# Patient Record
Sex: MALE | Race: BLACK OR AFRICAN AMERICAN | NOT HISPANIC OR LATINO | Employment: FULL TIME | URBAN - NONMETROPOLITAN AREA
[De-identification: names, ages, dates, MRNs, and addresses within clinical notes are randomized per-mention and may not be internally consistent; named-entity substitution may affect disease eponyms.]

---

## 2017-05-11 ENCOUNTER — APPOINTMENT (OUTPATIENT)
Dept: LAB | Facility: HOSPITAL | Age: 27
End: 2017-05-11

## 2017-05-11 ENCOUNTER — OFFICE VISIT (OUTPATIENT)
Dept: FAMILY MEDICINE CLINIC | Facility: CLINIC | Age: 27
End: 2017-05-11

## 2017-05-11 VITALS
WEIGHT: 223.4 LBS | HEIGHT: 71 IN | BODY MASS INDEX: 31.27 KG/M2 | SYSTOLIC BLOOD PRESSURE: 132 MMHG | DIASTOLIC BLOOD PRESSURE: 94 MMHG

## 2017-05-11 DIAGNOSIS — I10 ESSENTIAL HYPERTENSION, BENIGN: Primary | ICD-10-CM

## 2017-05-11 DIAGNOSIS — F12.90 MARIJUANA USE: ICD-10-CM

## 2017-05-11 DIAGNOSIS — Z11.59 NEED FOR HEPATITIS C SCREENING TEST: ICD-10-CM

## 2017-05-11 LAB
ALBUMIN SERPL-MCNC: 4.7 G/DL (ref 3.4–4.8)
ALBUMIN UR-MCNC: 1.7 MG/L
ALBUMIN/GLOB SERPL: 1.4 G/DL (ref 1.1–1.8)
ALP SERPL-CCNC: 74 U/L (ref 38–126)
ALT SERPL W P-5'-P-CCNC: 77 U/L (ref 21–72)
AMPHET+METHAMPHET UR QL: NEGATIVE
ANION GAP SERPL CALCULATED.3IONS-SCNC: 12 MMOL/L (ref 5–15)
AST SERPL-CCNC: 48 U/L (ref 17–59)
BARBITURATES UR QL SCN: NEGATIVE
BENZODIAZ UR QL SCN: NEGATIVE
BILIRUB SERPL-MCNC: 0.4 MG/DL (ref 0.2–1.3)
BUN BLD-MCNC: 20 MG/DL (ref 7–21)
BUN/CREAT SERPL: 20 (ref 7–25)
CALCIUM SPEC-SCNC: 9.8 MG/DL (ref 8.4–10.2)
CANNABINOIDS SERPL QL: POSITIVE
CHLORIDE SERPL-SCNC: 100 MMOL/L (ref 95–110)
CHOLEST SERPL-MCNC: 147 MG/DL (ref 0–199)
CO2 SERPL-SCNC: 27 MMOL/L (ref 22–31)
COCAINE UR QL: NEGATIVE
CREAT BLD-MCNC: 1 MG/DL (ref 0.7–1.3)
DEPRECATED RDW RBC AUTO: 40.5 FL (ref 35.1–43.9)
ERYTHROCYTE [DISTWIDTH] IN BLOOD BY AUTOMATED COUNT: 12.6 % (ref 11.5–14.5)
GFR SERPL CREATININE-BSD FRML MDRD: 107 ML/MIN/1.73 (ref 77–179)
GLOBULIN UR ELPH-MCNC: 3.3 GM/DL (ref 2.3–3.5)
GLUCOSE BLD-MCNC: 103 MG/DL (ref 60–100)
HCT VFR BLD AUTO: 38.4 % (ref 39–49)
HDLC SERPL-MCNC: 40 MG/DL (ref 60–200)
HGB BLD-MCNC: 13.2 G/DL (ref 13.7–17.3)
LDLC SERPL CALC-MCNC: 93 MG/DL (ref 0–129)
LDLC/HDLC SERPL: 2.34 {RATIO} (ref 0–3.55)
MAGNESIUM SERPL-MCNC: 2 MG/DL (ref 1.6–2.3)
MCH RBC QN AUTO: 30.1 PG (ref 26.5–34)
MCHC RBC AUTO-ENTMCNC: 34.4 G/DL (ref 31.5–36.3)
MCV RBC AUTO: 87.5 FL (ref 80–98)
METHADONE UR QL SCN: NEGATIVE
OPIATES UR QL: NEGATIVE
OXYCODONE UR QL SCN: NEGATIVE
PLATELET # BLD AUTO: 159 10*3/MM3 (ref 150–450)
PMV BLD AUTO: 11.5 FL (ref 8–12)
POTASSIUM BLD-SCNC: 4 MMOL/L (ref 3.5–5.1)
PROT SERPL-MCNC: 8 G/DL (ref 6.3–8.6)
RBC # BLD AUTO: 4.39 10*6/MM3 (ref 4.37–5.74)
SODIUM BLD-SCNC: 139 MMOL/L (ref 137–145)
T4 FREE SERPL-MCNC: 1.19 NG/DL (ref 0.78–2.19)
TRIGL SERPL-MCNC: 68 MG/DL (ref 20–199)
TSH SERPL DL<=0.05 MIU/L-ACNC: 1.11 MIU/ML (ref 0.46–4.68)
VLDLC SERPL-MCNC: 13.6 MG/DL
WBC NRBC COR # BLD: 6.48 10*3/MM3 (ref 3.2–9.8)

## 2017-05-11 PROCEDURE — 80307 DRUG TEST PRSMV CHEM ANLYZR: CPT | Performed by: FAMILY MEDICINE

## 2017-05-11 PROCEDURE — 84443 ASSAY THYROID STIM HORMONE: CPT | Performed by: FAMILY MEDICINE

## 2017-05-11 PROCEDURE — 80074 ACUTE HEPATITIS PANEL: CPT | Performed by: FAMILY MEDICINE

## 2017-05-11 PROCEDURE — 80061 LIPID PANEL: CPT | Performed by: FAMILY MEDICINE

## 2017-05-11 PROCEDURE — 84439 ASSAY OF FREE THYROXINE: CPT | Performed by: FAMILY MEDICINE

## 2017-05-11 PROCEDURE — 83735 ASSAY OF MAGNESIUM: CPT | Performed by: FAMILY MEDICINE

## 2017-05-11 PROCEDURE — 85027 COMPLETE CBC AUTOMATED: CPT | Performed by: FAMILY MEDICINE

## 2017-05-11 PROCEDURE — 99203 OFFICE O/P NEW LOW 30 MIN: CPT | Performed by: FAMILY MEDICINE

## 2017-05-11 PROCEDURE — 82043 UR ALBUMIN QUANTITATIVE: CPT | Performed by: FAMILY MEDICINE

## 2017-05-11 PROCEDURE — 36415 COLL VENOUS BLD VENIPUNCTURE: CPT | Performed by: FAMILY MEDICINE

## 2017-05-11 PROCEDURE — 80053 COMPREHEN METABOLIC PANEL: CPT | Performed by: FAMILY MEDICINE

## 2017-05-11 RX ORDER — CHLORTHALIDONE 25 MG/1
25 TABLET ORAL DAILY
Qty: 90 TABLET | Refills: 3 | Status: SHIPPED | OUTPATIENT
Start: 2017-05-11 | End: 2017-06-20 | Stop reason: SDUPTHER

## 2017-05-11 RX ORDER — AMLODIPINE BESYLATE 10 MG/1
10 TABLET ORAL DAILY
Qty: 90 TABLET | Refills: 3 | Status: SHIPPED | OUTPATIENT
Start: 2017-05-11 | End: 2017-06-20 | Stop reason: SDUPTHER

## 2017-05-11 RX ORDER — PANTOPRAZOLE SODIUM 40 MG/1
40 TABLET, DELAYED RELEASE ORAL DAILY
COMMUNITY
Start: 2017-04-11 | End: 2017-06-20 | Stop reason: SDUPTHER

## 2017-05-11 RX ORDER — AMLODIPINE BESYLATE 5 MG/1
5 TABLET ORAL DAILY
COMMUNITY
Start: 2017-04-18 | End: 2017-05-11

## 2017-05-11 RX ORDER — LISINOPRIL AND HYDROCHLOROTHIAZIDE 25; 20 MG/1; MG/1
1 TABLET ORAL DAILY
COMMUNITY
Start: 2017-04-13 | End: 2017-05-11

## 2017-05-12 LAB
HAV IGM SERPL QL IA: NEGATIVE
HBV CORE IGM SERPL QL IA: NEGATIVE
HBV SURFACE AG SERPL QL IA: NEGATIVE
HCV AB SER DONR QL: NEGATIVE

## 2017-06-06 ENCOUNTER — OFFICE VISIT (OUTPATIENT)
Dept: FAMILY MEDICINE CLINIC | Facility: CLINIC | Age: 27
End: 2017-06-06

## 2017-06-06 VITALS
DIASTOLIC BLOOD PRESSURE: 79 MMHG | BODY MASS INDEX: 31.5 KG/M2 | WEIGHT: 225 LBS | SYSTOLIC BLOOD PRESSURE: 132 MMHG | HEIGHT: 71 IN

## 2017-06-06 DIAGNOSIS — I10 ESSENTIAL HYPERTENSION, BENIGN: Primary | Chronic | ICD-10-CM

## 2017-06-06 DIAGNOSIS — J31.0 RHINITIS, UNSPECIFIED TYPE: ICD-10-CM

## 2017-06-06 DIAGNOSIS — Z23 NEED FOR VACCINATION: ICD-10-CM

## 2017-06-06 PROBLEM — F10.90 HEAVY ALCOHOL USE: Chronic | Status: ACTIVE | Noted: 2017-06-06

## 2017-06-06 PROBLEM — F10.90 HEAVY ALCOHOL USE: Status: ACTIVE | Noted: 2017-06-06

## 2017-06-06 PROBLEM — F12.90 MARIJUANA USE: Chronic | Status: ACTIVE | Noted: 2017-05-11

## 2017-06-06 PROCEDURE — 90715 TDAP VACCINE 7 YRS/> IM: CPT | Performed by: FAMILY MEDICINE

## 2017-06-06 PROCEDURE — 99213 OFFICE O/P EST LOW 20 MIN: CPT | Performed by: FAMILY MEDICINE

## 2017-06-06 PROCEDURE — 90471 IMMUNIZATION ADMIN: CPT | Performed by: FAMILY MEDICINE

## 2017-06-06 RX ORDER — FLUTICASONE PROPIONATE 50 MCG
2 SPRAY, SUSPENSION (ML) NASAL DAILY
Qty: 1 EACH | Refills: 11 | Status: SHIPPED | OUTPATIENT
Start: 2017-06-06 | End: 2017-06-20

## 2017-06-06 NOTE — PROGRESS NOTES
Subjective   Roosevelt Ochoa is a 27 y.o. male.     History of Present Illness reevaluation hypertension.  In the interim lab work was acceptable no problems medication.  Is developed a sore throat drainage postnasal drip last 2 weeks related mainly to environment.  Also needs update on tetanus.  States has cut back on smoking marijuana cut back on alcohol as well.    The following portions of the patient's history were reviewed and updated as appropriate: allergies, current medications, past family history, past medical history, past social history, past surgical history and problem list.    Review of Systems   Constitutional: Negative for activity change, appetite change, fatigue and unexpected weight change.   HENT: Positive for rhinorrhea and sore throat. Negative for trouble swallowing and voice change.    Eyes: Negative for redness and visual disturbance.   Respiratory: Negative for cough and wheezing.    Cardiovascular: Negative for chest pain and palpitations.   Gastrointestinal: Negative for abdominal pain, constipation, diarrhea, nausea and vomiting.   Genitourinary: Negative for urgency.   Musculoskeletal: Negative for joint swelling.   Neurological: Negative for syncope and headaches.   Hematological: Negative for adenopathy.   Psychiatric/Behavioral: Negative for sleep disturbance.       Objective   Physical Exam   Constitutional: He is oriented to person, place, and time. He appears well-developed.   HENT:   Head: Normocephalic.   Right Ear: External ear normal.   Nose: Mucosal edema and rhinorrhea present.   Mouth/Throat: Posterior oropharyngeal erythema present.   Eyes: Pupils are equal, round, and reactive to light.   Neck: Normal range of motion. No thyromegaly present.   Cardiovascular: Normal rate, regular rhythm, normal heart sounds and intact distal pulses.  Exam reveals no gallop and no friction rub.    No murmur heard.  Pulmonary/Chest: Breath sounds normal.   Abdominal: Soft. He  exhibits no distension and no mass. There is no tenderness.   Musculoskeletal: Normal range of motion.   Neurological: He is alert and oriented to person, place, and time. He has normal reflexes.   Skin: Skin is warm and dry.   Psychiatric: He has a normal mood and affect.       Assessment/Plan   Problems Addressed this Visit        Cardiovascular and Mediastinum    Essential hypertension, benign - Primary (Chronic)       Other    BMI 31.0-31.9,adult (Chronic)      Other Visit Diagnoses     Rhinitis, unspecified type        Relevant Medications    fluticasone (FLONASE) 50 MCG/ACT nasal spray    Need for vaccination        Relevant Orders    Tdap Vaccine Greater Than or Equal To 6yo IM        Radha pot use fluids medication.   continuing medications for blood pressure.  Lifestyle changes.  Recheck 6 months.

## 2017-06-20 ENCOUNTER — OFFICE VISIT (OUTPATIENT)
Dept: FAMILY MEDICINE CLINIC | Facility: CLINIC | Age: 27
End: 2017-06-20

## 2017-06-20 VITALS
TEMPERATURE: 99.5 F | SYSTOLIC BLOOD PRESSURE: 124 MMHG | HEIGHT: 71 IN | WEIGHT: 229 LBS | DIASTOLIC BLOOD PRESSURE: 80 MMHG | BODY MASS INDEX: 32.06 KG/M2

## 2017-06-20 DIAGNOSIS — J06.9 ACUTE URI: ICD-10-CM

## 2017-06-20 DIAGNOSIS — I10 ESSENTIAL HYPERTENSION, BENIGN: ICD-10-CM

## 2017-06-20 DIAGNOSIS — R19.7 DIARRHEA, UNSPECIFIED TYPE: Primary | ICD-10-CM

## 2017-06-20 DIAGNOSIS — J40 BRONCHITIS: ICD-10-CM

## 2017-06-20 PROCEDURE — 99214 OFFICE O/P EST MOD 30 MIN: CPT | Performed by: FAMILY MEDICINE

## 2017-06-20 RX ORDER — AMLODIPINE BESYLATE 10 MG/1
10 TABLET ORAL DAILY
Qty: 90 TABLET | Refills: 3 | Status: SHIPPED | OUTPATIENT
Start: 2017-06-20 | End: 2017-12-06 | Stop reason: SDUPTHER

## 2017-06-20 RX ORDER — ALBUTEROL SULFATE 90 UG/1
2 AEROSOL, METERED RESPIRATORY (INHALATION) EVERY 4 HOURS PRN
Qty: 1 INHALER | Refills: 2 | Status: SHIPPED | OUTPATIENT
Start: 2017-06-20 | End: 2018-08-14 | Stop reason: SDUPTHER

## 2017-06-20 RX ORDER — CHLORTHALIDONE 25 MG/1
25 TABLET ORAL DAILY
Qty: 90 TABLET | Refills: 3 | Status: SHIPPED | OUTPATIENT
Start: 2017-06-20 | End: 2017-09-17

## 2017-06-20 RX ORDER — PANTOPRAZOLE SODIUM 40 MG/1
40 TABLET, DELAYED RELEASE ORAL DAILY
Qty: 90 TABLET | Refills: 3 | Status: SHIPPED | OUTPATIENT
Start: 2017-06-20 | End: 2018-07-05 | Stop reason: SDUPTHER

## 2017-06-20 RX ORDER — PREDNISONE 20 MG/1
TABLET ORAL
Qty: 10 TABLET | Refills: 0 | Status: SHIPPED | OUTPATIENT
Start: 2017-06-20 | End: 2017-09-17

## 2017-06-20 RX ORDER — LEVOFLOXACIN 500 MG/1
500 TABLET, FILM COATED ORAL DAILY
Qty: 10 TABLET | Refills: 0 | Status: SHIPPED | OUTPATIENT
Start: 2017-06-20 | End: 2017-09-17

## 2017-06-20 RX ORDER — PROMETHAZINE HYDROCHLORIDE AND PHENYLEPHRINE HYDROCHLORIDE 6.25; 5 MG/5ML; MG/5ML
5 SYRUP ORAL EVERY 4 HOURS PRN
Qty: 240 ML | Refills: 0 | Status: SHIPPED | OUTPATIENT
Start: 2017-06-20 | End: 2017-09-17

## 2017-06-20 NOTE — PROGRESS NOTES
Subjective   Roosevelt Ochoa is a 27 y.o. male.     History of Present Illness for 5 days cough congestion coryza wheeze.  Exposed to same.  Low-grade fever.  Some associated diarrhea.  Has used an inhaler in the past but none recently.  History noted.    The following portions of the patient's history were reviewed and updated as appropriate: allergies, current medications, past family history, past medical history, past social history, past surgical history and problem list.    Review of Systems   Constitutional: Negative for activity change, appetite change, fatigue and unexpected weight change.   HENT: Negative for trouble swallowing and voice change.    Eyes: Negative for redness and visual disturbance.   Respiratory: Positive for cough. Negative for wheezing.    Cardiovascular: Negative for chest pain and palpitations.   Gastrointestinal: Positive for diarrhea and nausea. Negative for abdominal pain, constipation and vomiting.   Genitourinary: Negative for urgency.   Musculoskeletal: Negative for joint swelling.   Neurological: Negative for syncope and headaches.   Hematological: Negative for adenopathy.   Psychiatric/Behavioral: Negative for sleep disturbance.       Objective   Physical Exam   HENT:   Head: Normocephalic.   Right Ear: External ear normal.   Left Ear: External ear normal.   Mouth/Throat: Oropharynx is clear and moist.   Eyes: Pupils are equal, round, and reactive to light.   Neck: Normal range of motion. Neck supple.   Cardiovascular: Normal rate, regular rhythm and normal heart sounds.    Pulmonary/Chest: Effort normal. He has wheezes (Scant expiratory bases).   Abdominal: Soft. Bowel sounds are normal.   Musculoskeletal: Normal range of motion.   Neurological: He is alert.       Assessment/Plan   Problems Addressed this Visit        Cardiovascular and Mediastinum    Essential hypertension, benign (Chronic)    Relevant Medications    amLODIPine (NORVASC) 10 MG tablet    chlorthalidone  (HYGROTON) 25 MG tablet      Other Visit Diagnoses     Diarrhea, unspecified type    -  Primary    Bronchitis        Relevant Medications    pantoprazole (PROTONIX) 40 MG EC tablet    albuterol (PROVENTIL HFA;VENTOLIN HFA) 108 (90 BASE) MCG/ACT inhaler    promethazine-phenylephrine 6.25-5 MG/5ML syrup syrup    levoFLOXacin (LEVAQUIN) 500 MG tablet    predniSONE (DELTASONE) 20 MG tablet    Acute URI        Relevant Medications    pantoprazole (PROTONIX) 40 MG EC tablet    albuterol (PROVENTIL HFA;VENTOLIN HFA) 108 (90 BASE) MCG/ACT inhaler    promethazine-phenylephrine 6.25-5 MG/5ML syrup syrup    levoFLOXacin (LEVAQUIN) 500 MG tablet           holding off on chlorthalidone for 3 or 4 days.  Increase electrolyte oral hydration.  Inhaler use medication use counseled on same.  Flu vaccine in the fall.  Recheck as directed.

## 2017-09-17 ENCOUNTER — HOSPITAL ENCOUNTER (EMERGENCY)
Facility: HOSPITAL | Age: 27
Discharge: HOME | End: 2017-09-17
Attending: EMERGENCY MEDICINE | Admitting: EMERGENCY MEDICINE

## 2017-09-17 ENCOUNTER — APPOINTMENT (OUTPATIENT)
Dept: CT IMAGING | Facility: HOSPITAL | Age: 27
End: 2017-09-17

## 2017-09-17 VITALS
DIASTOLIC BLOOD PRESSURE: 100 MMHG | WEIGHT: 223 LBS | OXYGEN SATURATION: 99 % | BODY MASS INDEX: 31.22 KG/M2 | RESPIRATION RATE: 18 BRPM | TEMPERATURE: 98.1 F | SYSTOLIC BLOOD PRESSURE: 181 MMHG | HEIGHT: 71 IN | HEART RATE: 65 BPM

## 2017-09-17 DIAGNOSIS — S02.30XB: Primary | ICD-10-CM

## 2017-09-17 DIAGNOSIS — H11.32 CONJUNCTIVAL HEMORRHAGE OF LEFT EYE: ICD-10-CM

## 2017-09-17 PROCEDURE — 99284 EMERGENCY DEPT VISIT MOD MDM: CPT

## 2017-09-17 PROCEDURE — 70486 CT MAXILLOFACIAL W/O DYE: CPT

## 2017-09-17 RX ORDER — CLINDAMYCIN HYDROCHLORIDE 300 MG/1
300 CAPSULE ORAL 3 TIMES DAILY
Qty: 30 CAPSULE | Refills: 0 | Status: SHIPPED | OUTPATIENT
Start: 2017-09-17 | End: 2017-09-17 | Stop reason: HOSPADM

## 2017-09-17 RX ORDER — HYDROCODONE BITARTRATE AND ACETAMINOPHEN 5; 325 MG/1; MG/1
1 TABLET ORAL EVERY 6 HOURS PRN
Qty: 29 TABLET | Refills: 0 | Status: SHIPPED | OUTPATIENT
Start: 2017-09-17 | End: 2017-09-20

## 2017-09-17 RX ORDER — HYDROCODONE BITARTRATE AND ACETAMINOPHEN 5; 325 MG/1; MG/1
1 TABLET ORAL ONCE
Status: COMPLETED | OUTPATIENT
Start: 2017-09-17 | End: 2017-09-17

## 2017-09-17 RX ORDER — CEPHALEXIN 500 MG/1
500 CAPSULE ORAL 4 TIMES DAILY
Qty: 40 CAPSULE | Refills: 0 | Status: SHIPPED | OUTPATIENT
Start: 2017-09-17 | End: 2017-09-27

## 2017-09-17 RX ADMIN — HYDROCODONE BITARTRATE AND ACETAMINOPHEN 1 TABLET: 5; 325 TABLET ORAL at 15:52
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
## 2017-09-17 NOTE — DISCHARGE INSTRUCTIONS

## 2017-12-06 ENCOUNTER — OFFICE VISIT (OUTPATIENT)
Dept: FAMILY MEDICINE CLINIC | Facility: CLINIC | Age: 27
End: 2017-12-06

## 2017-12-06 VITALS
WEIGHT: 235.1 LBS | BODY MASS INDEX: 32.91 KG/M2 | HEIGHT: 71 IN | DIASTOLIC BLOOD PRESSURE: 88 MMHG | SYSTOLIC BLOOD PRESSURE: 122 MMHG

## 2017-12-06 DIAGNOSIS — I10 ESSENTIAL HYPERTENSION, BENIGN: Primary | Chronic | ICD-10-CM

## 2017-12-06 DIAGNOSIS — Z23 NEED FOR IMMUNIZATION AGAINST INFLUENZA: ICD-10-CM

## 2017-12-06 PROBLEM — Z86.69 HISTORY OF RETINAL TEAR: Chronic | Status: ACTIVE | Noted: 2017-12-06

## 2017-12-06 PROBLEM — Z86.69 HISTORY OF RETINAL TEAR: Status: ACTIVE | Noted: 2017-12-06

## 2017-12-06 PROBLEM — F10.90 HEAVY ALCOHOL USE: Chronic | Status: RESOLVED | Noted: 2017-06-06 | Resolved: 2017-12-06

## 2017-12-06 PROCEDURE — 99213 OFFICE O/P EST LOW 20 MIN: CPT | Performed by: FAMILY MEDICINE

## 2017-12-06 PROCEDURE — 90471 IMMUNIZATION ADMIN: CPT | Performed by: FAMILY MEDICINE

## 2017-12-06 PROCEDURE — 90686 IIV4 VACC NO PRSV 0.5 ML IM: CPT | Performed by: FAMILY MEDICINE

## 2017-12-06 RX ORDER — AMLODIPINE BESYLATE 10 MG/1
10 TABLET ORAL DAILY
Qty: 90 TABLET | Refills: 3 | Status: SHIPPED | OUTPATIENT
Start: 2017-12-06 | End: 2019-05-08

## 2017-12-06 NOTE — PROGRESS NOTES
Subjective   Roosevelt Ochoa is a 27 y.o. male.     History of Present Illness   reevaluation hypertension mild obesity.  In interim said a retinal tear that had to be repaired was in active for about a month.  Weights gone up slightly.  Blood pressure control over.  States she's cut back on alcohol and THC use.  Does need a flu vaccine.  Last lab work normal.    The following portions of the patient's history were reviewed and updated as appropriate: allergies, current medications, past family history, past medical history, past social history, past surgical history and problem list.    Review of Systems   Constitutional: Negative for activity change, appetite change, fatigue and unexpected weight change.   HENT: Negative for trouble swallowing and voice change.    Eyes: Negative for redness and visual disturbance.   Respiratory: Negative for cough and wheezing.    Cardiovascular: Negative for chest pain and palpitations.   Gastrointestinal: Negative for abdominal pain, constipation, diarrhea, nausea and vomiting.   Genitourinary: Negative for urgency.   Musculoskeletal: Negative for joint swelling.   Neurological: Negative for syncope and headaches.   Hematological: Negative for adenopathy.   Psychiatric/Behavioral: Negative for sleep disturbance.       Objective   Physical Exam   Constitutional: He is oriented to person, place, and time. He appears well-developed.   HENT:   Head: Normocephalic.   Nose: Nose normal.   Eyes: Pupils are equal, round, and reactive to light.   Neck: Normal range of motion. No thyromegaly present.   Cardiovascular: Normal rate, regular rhythm, normal heart sounds and intact distal pulses.  Exam reveals no gallop and no friction rub.    No murmur heard.  Pulmonary/Chest: Breath sounds normal.   Abdominal: Soft. He exhibits no distension and no mass. There is no tenderness.   Musculoskeletal: Normal range of motion.   Neurological: He is alert and oriented to person, place, and  time. He has normal reflexes.   Skin: Skin is warm and dry.   Psychiatric: He has a normal mood and affect. His behavior is normal. Thought content normal.       Assessment/Plan   Problems Addressed this Visit        Cardiovascular and Mediastinum    Essential hypertension, benign - Primary (Chronic)    Relevant Medications    amLODIPine (NORVASC) 10 MG tablet       Other    BMI 31.0-31.9,adult (Chronic)      Other Visit Diagnoses     Need for immunization against influenza        Relevant Orders    Flu Vaccine Quad PF 3YR+ (Completed)           lifestyle measures weight loss continue continue current medicines recheck 6 months betime for lab

## 2018-03-21 ENCOUNTER — OFFICE VISIT (OUTPATIENT)
Dept: FAMILY MEDICINE CLINIC | Facility: CLINIC | Age: 28
End: 2018-03-21

## 2018-03-21 VITALS
BODY MASS INDEX: 31.64 KG/M2 | SYSTOLIC BLOOD PRESSURE: 140 MMHG | HEIGHT: 71 IN | DIASTOLIC BLOOD PRESSURE: 90 MMHG | WEIGHT: 226 LBS

## 2018-03-21 DIAGNOSIS — J11.1 INFLUENZA: ICD-10-CM

## 2018-03-21 DIAGNOSIS — J06.9 ACUTE URI: Primary | ICD-10-CM

## 2018-03-21 PROCEDURE — 99213 OFFICE O/P EST LOW 20 MIN: CPT | Performed by: FAMILY MEDICINE

## 2018-03-21 RX ORDER — CHLORTHALIDONE 25 MG/1
25 TABLET ORAL DAILY
COMMUNITY
Start: 2018-01-29 | End: 2019-05-08

## 2018-03-21 RX ORDER — AMOXICILLIN 875 MG/1
875 TABLET, COATED ORAL DAILY
COMMUNITY
Start: 2018-01-29 | End: 2018-03-21

## 2018-03-21 RX ORDER — ONDANSETRON 4 MG/1
4 TABLET, FILM COATED ORAL DAILY
COMMUNITY
Start: 2018-01-29 | End: 2018-08-07

## 2018-03-21 NOTE — PROGRESS NOTES
Subjective   Roosevelt Ochoa is a 27 y.o. male.     History of Present Illness  reevaluation care center.  Diagnosed influenza negative screening for clinically had same.  Was given Tamiflu and symptomatic relief.  Patient states has has some mild congestion still.  Overall feels greatly improved.  Nochills.Historynoted.    The following portions of the patient's history were reviewed and updated as appropriate: allergies, current medications, past family history, past medical history, past social history, past surgical history and problem list.    Review of Systems   Constitutional: Negative for activity change, appetite change, fatigue and unexpected weight change.   HENT: Positive for congestion. Negative for trouble swallowing and voice change.    Eyes: Negative for redness and visual disturbance.   Respiratory: Negative for cough and wheezing.    Cardiovascular: Negative for chest pain and palpitations.   Gastrointestinal: Negative for abdominal pain, constipation, diarrhea, nausea and vomiting.   Genitourinary: Negative for urgency.   Musculoskeletal: Negative for joint swelling.   Neurological: Negative for syncope and headaches.   Hematological: Negative for adenopathy.   Psychiatric/Behavioral: Negative for sleep disturbance.       Objective   Physical Exam   Constitutional: He appears well-developed.   HENT:   Head: Normocephalic.   Eyes: Pupils are equal, round, and reactive to light.   Neck: Normal range of motion.   Cardiovascular: Normal rate.    Pulmonary/Chest: Effort normal and breath sounds normal.   Abdominal: Soft.   Psychiatric: He has a normal mood and affect. His behavior is normal. Thought content normal.       Assessment/Plan   Roosevelt was seen today for follow-up.    Diagnoses and all orders for this visit:    Acute URI    Influenza        on hydration Radha medication fluids symptomatic relief recheck as directed

## 2018-06-27 ENCOUNTER — OFFICE VISIT (OUTPATIENT)
Dept: FAMILY MEDICINE CLINIC | Facility: CLINIC | Age: 28
End: 2018-06-27

## 2018-06-27 ENCOUNTER — APPOINTMENT (OUTPATIENT)
Dept: LAB | Facility: HOSPITAL | Age: 28
End: 2018-06-27

## 2018-06-27 VITALS
WEIGHT: 229 LBS | BODY MASS INDEX: 32.06 KG/M2 | HEIGHT: 71 IN | SYSTOLIC BLOOD PRESSURE: 140 MMHG | DIASTOLIC BLOOD PRESSURE: 82 MMHG

## 2018-06-27 DIAGNOSIS — I10 ESSENTIAL HYPERTENSION, BENIGN: Chronic | ICD-10-CM

## 2018-06-27 DIAGNOSIS — M25.522 ELBOW PAIN, LEFT: Primary | ICD-10-CM

## 2018-06-27 DIAGNOSIS — M75.22 BICEPS TENDONITIS ON LEFT: ICD-10-CM

## 2018-06-27 LAB
ALBUMIN SERPL-MCNC: 4.4 G/DL (ref 3.4–4.8)
ALBUMIN/GLOB SERPL: 1.4 G/DL (ref 1.1–1.8)
ALP SERPL-CCNC: 86 U/L (ref 38–126)
ALT SERPL W P-5'-P-CCNC: 43 U/L (ref 21–72)
ANION GAP SERPL CALCULATED.3IONS-SCNC: 10 MMOL/L (ref 5–15)
AST SERPL-CCNC: 48 U/L (ref 17–59)
BILIRUB SERPL-MCNC: 0.3 MG/DL (ref 0.2–1.3)
BUN BLD-MCNC: 12 MG/DL (ref 7–21)
BUN/CREAT SERPL: 15.2 (ref 7–25)
CALCIUM SPEC-SCNC: 9.1 MG/DL (ref 8.4–10.2)
CHLORIDE SERPL-SCNC: 105 MMOL/L (ref 95–110)
CO2 SERPL-SCNC: 26 MMOL/L (ref 22–31)
CREAT BLD-MCNC: 0.79 MG/DL (ref 0.7–1.3)
GFR SERPL CREATININE-BSD FRML MDRD: 140 ML/MIN/1.73 (ref 70–162)
GLOBULIN UR ELPH-MCNC: 3.1 GM/DL (ref 2.3–3.5)
GLUCOSE BLD-MCNC: 93 MG/DL (ref 60–100)
MAGNESIUM SERPL-MCNC: 2 MG/DL (ref 1.6–2.3)
POTASSIUM BLD-SCNC: 4.1 MMOL/L (ref 3.5–5.1)
PROT SERPL-MCNC: 7.5 G/DL (ref 6.3–8.6)
SODIUM BLD-SCNC: 141 MMOL/L (ref 137–145)

## 2018-06-27 PROCEDURE — 99214 OFFICE O/P EST MOD 30 MIN: CPT | Performed by: FAMILY MEDICINE

## 2018-06-27 PROCEDURE — 36415 COLL VENOUS BLD VENIPUNCTURE: CPT | Performed by: FAMILY MEDICINE

## 2018-06-27 PROCEDURE — 83735 ASSAY OF MAGNESIUM: CPT | Performed by: FAMILY MEDICINE

## 2018-06-27 PROCEDURE — 80053 COMPREHEN METABOLIC PANEL: CPT | Performed by: FAMILY MEDICINE

## 2018-06-27 RX ORDER — MELOXICAM 7.5 MG/1
TABLET ORAL
Qty: 30 TABLET | Refills: 1 | Status: SHIPPED | OUTPATIENT
Start: 2018-06-27 | End: 2019-05-29

## 2018-06-27 NOTE — PROGRESS NOTES
Subjective   Roosevelt Ochoa is a 28 y.o. male.     History of Present Illness   follow-up hypertension also developed left elbow pain biceps distal tendon area.  Been doing a lot of heavy lifting lifting blocks etc.  Undergo now about 3 weeks getting a little better.  Remains on blood pressure medicine.    The following portions of the patient's history were reviewed and updated as appropriate: allergies, current medications, past family history, past medical history, past social history, past surgical history and problem list.    Review of Systems   Constitutional: Negative for activity change, appetite change, fatigue and unexpected weight change.   HENT: Negative for trouble swallowing and voice change.    Eyes: Negative for redness and visual disturbance.   Respiratory: Negative for cough and wheezing.    Cardiovascular: Negative for chest pain and palpitations.   Gastrointestinal: Negative for abdominal pain, constipation, diarrhea, nausea and vomiting.   Genitourinary: Negative for urgency.   Musculoskeletal: Positive for arthralgias. Negative for joint swelling.   Neurological: Negative for syncope and headaches.   Hematological: Negative for adenopathy.   Psychiatric/Behavioral: Negative for sleep disturbance.       Objective   Physical Exam   Constitutional: He appears well-developed.   HENT:   Head: Normocephalic.   Eyes: Pupils are equal, round, and reactive to light.   Neck: Normal range of motion.   Cardiovascular: Normal rate and regular rhythm.    Pulmonary/Chest: Effort normal.   Abdominal: Soft.   Musculoskeletal:   Left elbow shows medial lateral epicondyles nontender lice of tendon tender to deep insertion his antecubital fossa.  Pain to full extension of the antecubital fossa only.  Mild tenderness to full extension of the shoulder.  No double bubble sign.   normal.   Psychiatric: He has a normal mood and affect. His speech is normal and behavior is normal.       Assessment/Plan   Roosevelt  was seen today for follow-up.    Diagnoses and all orders for this visit:    Elbow pain, left  -     meloxicam (MOBIC) 7.5 MG tablet; 1 bid x 5d then prn elbow    Biceps tendonitis on left  -     meloxicam (MOBIC) 7.5 MG tablet; 1 bid x 5d then prn elbow    Essential hypertension, benign  -     Comprehensive Metabolic Panel  -     Magnesium    BMI 31.0-31.9,adult      For the elbow  heat being mindful of acute stretches mild exercise etc. short-term medication time observation.  Time for lab work today.   salt restriction diet and exercise weight loss as always.  Recheck 6 months.

## 2018-07-05 DIAGNOSIS — J06.9 ACUTE URI: ICD-10-CM

## 2018-07-05 DIAGNOSIS — J40 BRONCHITIS: ICD-10-CM

## 2018-07-05 RX ORDER — PANTOPRAZOLE SODIUM 40 MG/1
TABLET, DELAYED RELEASE ORAL
Qty: 30 TABLET | Refills: 2 | Status: SHIPPED | OUTPATIENT
Start: 2018-07-05 | End: 2018-08-07

## 2018-08-08 ENCOUNTER — OFFICE VISIT (OUTPATIENT)
Dept: FAMILY MEDICINE CLINIC | Facility: CLINIC | Age: 28
End: 2018-08-08

## 2018-08-08 VITALS
SYSTOLIC BLOOD PRESSURE: 138 MMHG | BODY MASS INDEX: 31.78 KG/M2 | HEIGHT: 71 IN | DIASTOLIC BLOOD PRESSURE: 88 MMHG | WEIGHT: 227 LBS

## 2018-08-08 DIAGNOSIS — S76.011A STRAIN OF RIGHT HIP ADDUCTOR MUSCLE, INITIAL ENCOUNTER: ICD-10-CM

## 2018-08-08 DIAGNOSIS — R10.31 GROIN PAIN, RIGHT: Primary | ICD-10-CM

## 2018-08-08 DIAGNOSIS — M25.551 HIP PAIN, RIGHT: ICD-10-CM

## 2018-08-08 PROCEDURE — 99214 OFFICE O/P EST MOD 30 MIN: CPT | Performed by: FAMILY MEDICINE

## 2018-08-08 RX ORDER — TRAMADOL HYDROCHLORIDE 50 MG/1
50 TABLET ORAL EVERY 6 HOURS PRN
Qty: 15 TABLET | Refills: 0 | Status: SHIPPED | OUTPATIENT
Start: 2018-08-08 | End: 2018-09-20

## 2018-08-08 NOTE — PROGRESS NOTES
Subjective   Roosevelt Ochoa is a 28 y.o. male.     History of Present Illness requesting evaluation right medial hip groin pain for the past several days to weeks.  Been seen by chiropractic in urgent care occurred just after stepping down.  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  Pain is in the right groin into the medial hips.  Worse with flexion of the hip and walking.    The following portions of the patient's history were reviewed and updated as appropriate: allergies, current medications, past family history, past medical history, past social history, past surgical history and problem list.    Review of Systems   Constitutional: Negative for activity change, appetite change, fatigue and unexpected weight change.   HENT: Negative for trouble swallowing and voice change.    Eyes: Negative for redness and visual disturbance.   Respiratory: Negative for cough and wheezing.    Cardiovascular: Negative for chest pain and palpitations.   Gastrointestinal: Negative for abdominal pain, constipation, diarrhea, nausea and vomiting.   Genitourinary: Negative for urgency.   Musculoskeletal: Positive for arthralgias and gait problem. Negative for joint swelling.   Neurological: Negative for syncope and headaches.   Hematological: Negative for adenopathy.   Psychiatric/Behavioral: Negative for sleep disturbance.       Objective   Physical Exam   Constitutional: He appears well-developed.   HENT:   Head: Normocephalic.   Eyes: Pupils are equal, round, and reactive to light.   Neck: Normal range of motion.   Cardiovascular: Normal rate.    Pulmonary/Chest: Effort normal and breath sounds normal.   Abdominal: Soft. Bowel sounds are normal. He exhibits no distension. There is no tenderness. Hernia confirmed negative in the right inguinal area and confirmed negative in the left inguinal area.   Genitourinary: Testes normal. Cremasteric reflex is present. Right testis shows no mass and no tenderness.  Left testis shows no mass and no tenderness. Circumcised.   Genitourinary Comments: No inguinal ring pain.   Musculoskeletal:        Right hip: He exhibits tenderness.   Point tender upper inner thigh anterior hip Patch and hip adductor.  Pain to hip flexion and external rotation.  Pain with stepping downward.   Lymphadenopathy: No inguinal adenopathy noted on the right or left side.   Neurological:   Reflex Scores:       Brachioradialis reflexes are 2+ on the right side and 2+ on the left side.  Psychiatric: He has a normal mood and affect. His speech is normal and behavior is normal.       Assessment/Plan   Roosevelt was seen today for follow-up.    Diagnoses and all orders for this visit:    Groin pain, right  -     XR Hip With or Without Pelvis 2 - 3 View Right  -     Ambulatory Referral to Physical Therapy Evaluate and treat  -     traMADol (ULTRAM) 50 MG tablet; Take 1 tablet by mouth Every 6 (Six) Hours As Needed for Moderate Pain .    Hip pain, right  -     XR Hip With or Without Pelvis 2 - 3 View Right  -     Ambulatory Referral to Physical Therapy Evaluate and treat  -     traMADol (ULTRAM) 50 MG tablet; Take 1 tablet by mouth Every 6 (Six) Hours As Needed for Moderate Pain .    Strain of right hip adductor muscle, initial encounter  -     XR Hip With or Without Pelvis 2 - 3 View Right  -     Ambulatory Referral to Physical Therapy Evaluate and treat  -     traMADol (ULTRAM) 50 MG tablet; Take 1 tablet by mouth Every 6 (Six) Hours As Needed for Moderate Pain .          Suspect hip adductor injury.  I will plan includes heat physical therapy evaluation short-term medication x-ray to look for avulsion or hip abnormality recheck 3 weeks no lifting greater than 10 pounds upon return to work

## 2018-08-14 ENCOUNTER — HOSPITAL ENCOUNTER (OUTPATIENT)
Dept: PHYSICAL THERAPY | Facility: HOSPITAL | Age: 28
Setting detail: THERAPIES SERIES
Discharge: HOME | End: 2018-08-14

## 2018-08-14 ENCOUNTER — OFFICE VISIT (OUTPATIENT)
Dept: FAMILY MEDICINE CLINIC | Facility: CLINIC | Age: 28
End: 2018-08-14

## 2018-08-14 VITALS
SYSTOLIC BLOOD PRESSURE: 148 MMHG | DIASTOLIC BLOOD PRESSURE: 92 MMHG | HEIGHT: 71 IN | WEIGHT: 226.4 LBS | BODY MASS INDEX: 31.69 KG/M2 | TEMPERATURE: 98.3 F

## 2018-08-14 DIAGNOSIS — R05.9 COUGH: Primary | ICD-10-CM

## 2018-08-14 DIAGNOSIS — J40 BRONCHITIS: ICD-10-CM

## 2018-08-14 DIAGNOSIS — J30.2 ACUTE SEASONAL ALLERGIC RHINITIS DUE TO OTHER ALLERGEN: ICD-10-CM

## 2018-08-14 DIAGNOSIS — S76.011D STRAIN OF RIGHT HIP ADDUCTOR MUSCLE, SUBSEQUENT ENCOUNTER: ICD-10-CM

## 2018-08-14 DIAGNOSIS — R10.31 RIGHT GROIN PAIN: Primary | ICD-10-CM

## 2018-08-14 DIAGNOSIS — M25.551 PAIN OF RIGHT HIP JOINT: ICD-10-CM

## 2018-08-14 DIAGNOSIS — J06.9 ACUTE URI: ICD-10-CM

## 2018-08-14 PROCEDURE — 97162 PT EVAL MOD COMPLEX 30 MIN: CPT | Performed by: PHYSICAL THERAPIST

## 2018-08-14 PROCEDURE — 99214 OFFICE O/P EST MOD 30 MIN: CPT | Performed by: FAMILY MEDICINE

## 2018-08-14 RX ORDER — PREDNISONE 20 MG/1
TABLET ORAL
Qty: 10 TABLET | Refills: 0 | Status: SHIPPED | OUTPATIENT
Start: 2018-08-14 | End: 2018-08-29

## 2018-08-14 RX ORDER — CYPROHEPTADINE HYDROCHLORIDE 4 MG/1
TABLET ORAL
Qty: 20 TABLET | Refills: 1 | Status: SHIPPED | OUTPATIENT
Start: 2018-08-14 | End: 2018-09-20

## 2018-08-14 RX ORDER — FLUTICASONE PROPIONATE 50 MCG
2 SPRAY, SUSPENSION (ML) NASAL DAILY
Qty: 1 BOTTLE | Refills: 11 | Status: SHIPPED | OUTPATIENT
Start: 2018-08-14 | End: 2018-09-20

## 2018-08-14 RX ORDER — ALBUTEROL SULFATE 90 UG/1
2 AEROSOL, METERED RESPIRATORY (INHALATION) EVERY 4 HOURS PRN
Qty: 1 INHALER | Refills: 2 | Status: SHIPPED | OUTPATIENT
Start: 2018-08-14 | End: 2018-09-20

## 2018-08-14 RX ORDER — DOXYCYCLINE HYCLATE 100 MG/1
CAPSULE ORAL
Qty: 20 CAPSULE | Refills: 0 | Status: SHIPPED | OUTPATIENT
Start: 2018-08-14 | End: 2018-08-29

## 2018-08-14 NOTE — PROGRESS NOTES
Subjective   Roosevelt Ochoa is a 28 y.o. male.     History of Present Illness   request evaluation 4 days cough congestion coryza sinus pressure drainage wheezing minimal sputum production.  Mild body aches.  Questionable.  Question exposure to same as well as lumbar.  Similar episode last month.    The following portions of the patient's history were reviewed and updated as appropriate: allergies, current medications, past family history, past medical history, past social history, past surgical history and problem list.    Review of Systems   Constitutional: Negative for activity change, appetite change, fatigue and unexpected weight change.   HENT: Positive for congestion, rhinorrhea and sinus pressure. Negative for trouble swallowing and voice change.    Eyes: Negative for redness and visual disturbance.   Respiratory: Positive for cough, chest tightness, shortness of breath and wheezing.    Cardiovascular: Negative for chest pain and palpitations.   Gastrointestinal: Negative for abdominal pain, constipation, diarrhea, nausea and vomiting.   Genitourinary: Negative for urgency.   Musculoskeletal: Negative for joint swelling.   Neurological: Negative for syncope and headaches.   Hematological: Negative for adenopathy.   Psychiatric/Behavioral: Negative for sleep disturbance.       Objective   Physical Exam   Constitutional: He is oriented to person, place, and time. He appears well-developed.   HENT:   Head: Normocephalic.   Right Ear: External ear normal.   Left Ear: External ear normal.   Nose: Mucosal edema and rhinorrhea present.   Mouth/Throat: Posterior oropharyngeal erythema present.   Eyes: Pupils are equal, round, and reactive to light.   Neck: Normal range of motion. No thyromegaly present.   Cardiovascular: Normal rate, regular rhythm, normal heart sounds and intact distal pulses.  Exam reveals no gallop and no friction rub.    No murmur heard.  Pulmonary/Chest: He has rhonchi in the right lower  field and the left lower field.   Normal rate normal phase   Abdominal: Soft. He exhibits no distension and no mass. There is no tenderness.   Musculoskeletal: Normal range of motion.   Neurological: He is alert and oriented to person, place, and time. He has normal reflexes.   Skin: Skin is warm and dry.   Psychiatric: He has a normal mood and affect.   No distress       Assessment/Plan   Roosevelt was seen today for cough.    Diagnoses and all orders for this visit:    Cough    Bronchitis  -     albuterol (PROVENTIL HFA;VENTOLIN HFA) 108 (90 Base) MCG/ACT inhaler; Inhale 2 puffs Every 4 (Four) Hours As Needed for Wheezing.  -     fluticasone (FLONASE) 50 MCG/ACT nasal spray; 2 sprays into the nostril(s) as directed by provider Daily. X 2-3wks then prn  -     predniSONE (DELTASONE) 20 MG tablet; 2qdx5  -     doxycycline (VIBRAMYCIN) 100 MG capsule; 1  Bid with food x 10d  -     cyproheptadine (PERIACTIN) 4 MG tablet; 1 tid prn congestion    Acute URI  -     albuterol (PROVENTIL HFA;VENTOLIN HFA) 108 (90 Base) MCG/ACT inhaler; Inhale 2 puffs Every 4 (Four) Hours As Needed for Wheezing.    Acute seasonal allergic rhinitis due to other allergen  -     fluticasone (FLONASE) 50 MCG/ACT nasal spray; 2 sprays into the nostril(s) as directed by provider Daily. X 2-3wks then prn  -     cyproheptadine (PERIACTIN) 4 MG tablet; 1 tid prn congestion       Radha pot use fluids handwashing symptomatic preventative measures discussed short and long term.  Recheck as directed

## 2018-08-15 NOTE — THERAPY EVALUATION
Outpatient Physical Therapy Ortho Initial Evaluation  [facility]     Patient Name: Roosevelt Ochoa  : 1990  MRN: [id number]  Today's Date: 2018      Visit Date: 2018  Attendance:  (authorization required)  Subjective Improvement: n/a  Next MD Appt: 18  Recert Date: 18    Therapy Diagnosis: R groin pain/hip adductor strain/pelvic malalignment         Past Medical History:   Diagnosis Date   • Blood chemistry abnormality    • Diarrhea    • Elevated blood pressure reading without diagnosis of hypertension    • Foot pain     probable tendonitis left foot      • Hip pain    • Hyperglycemia    • Hypertension    • Nausea and vomiting         Past Surgical History:   Procedure Laterality Date   • DENTAL PROCEDURE         Current Outpatient Prescriptions:   •  albuterol (PROVENTIL HFA;VENTOLIN HFA) 108 (90 Base) MCG/ACT inhaler, Inhale 2 puffs Every 4 (Four) Hours As Needed for Wheezing., Disp: 1 inhaler, Rfl: 2  •  amLODIPine (NORVASC) 10 MG tablet, Take 1 tablet by mouth Daily. For bp  New dose   Finish previous, Disp: 90 tablet, Rfl: 3  •  chlorthalidone (HYGROTON) 25 MG tablet, Take 25 mg by mouth Daily., Disp: , Rfl:   •  cyproheptadine (PERIACTIN) 4 MG tablet, 1 tid prn congestion, Disp: 20 tablet, Rfl: 1  •  doxycycline (VIBRAMYCIN) 100 MG capsule, 1  Bid with food x 10d, Disp: 20 capsule, Rfl: 0  •  fluticasone (FLONASE) 50 MCG/ACT nasal spray, 2 sprays into the nostril(s) as directed by provider Daily. X 2-3wks then prn, Disp: 1 bottle, Rfl: 11  •  meloxicam (MOBIC) 7.5 MG tablet, 1 bid x 5d then prn elbow, Disp: 30 tablet, Rfl: 1  •  predniSONE (DELTASONE) 20 MG tablet, 2qdx5, Disp: 10 tablet, Rfl: 0  •  traMADol (ULTRAM) 50 MG tablet, Take 1 tablet by mouth Every 6 (Six) Hours As Needed for Moderate Pain ., Disp: 15 tablet, Rfl: 0    No Known Allergies    Visit Dx:     ICD-10-CM ICD-9-CM   1. Right groin pain R10.31 789.09   2. Pain of right hip joint M25.551 719.45    3. Strain of right hip adductor muscle, subsequent encounter S76.011D V58.89     843.8             Patient History     Row Name 08/14/18 1400             History    Chief Complaint Pain  -SS      Type of Pain Lower Extremity / Leg  -      Date Current Problem(s) Began 08/06/18  -      Brief Description of Current Complaint No know cause of symptoms. States that he got up from sitting on bed to go somewhere and unable to put his heel down due to pain. Had not done any running, jumping, pushing, pulling, sports or activity that would have caused the issue. He has a history of pelvic malalignment several years ago. Has been seeing chiropractor for the malalignment. His groin is feeling some better but continues to hurt. Pain swinging his legs to get out of the car, occasionally with sitting, walking. Couldn't squat or flex his leg up at first. Has been avoiding squatting. Paraesthesia in adductor muscle. Pain inguinal fold. Single male with children. Lives in a single story house with 5 stairs to enter. Taking stairs 1 at a time presents.   -SS      Patient/Caregiver Goals Relieve pain;Return to prior level of function   work out again  -      Current Tobacco Use no  -SS      Smoking Status former  -SS      Patient's Rating of General Health Good  -      Occupation/sports/leisure activities [employer] - , off work since injury. Hobbies: weightlifting,  youth sports, video games, bowling, sports  -      What clinical tests have you had for this problem? X-ray  -      Results of Clinical Tests 5 mm bony density just lateral and superior to the right acetabulum, question old avulsion from the anterior inferior iliac spine  -         Pain     Pain Location Groin   right  -SS      Pain at Present 2  -SS      Pain at Best 2  -SS      Pain at Worst 10  -SS      Pain Frequency Constant/continuous  -SS      Pain Description Nagging;Dull   pressure through hips when lifts  -      What  Performance Factors Make the Current Problem(s) WORSE? see above  -SS      What Performance Factors Make the Current Problem(s) BETTER? see above  -SS      Is your sleep disturbed? No  -SS      Is medication used to assist with sleep? No  -SS      Difficulties at work? off work  -SS      Difficulties with ADL's? pain  -SS      Difficulties with recreational activities? pain  -SS         Fall Risk Assessment    Any falls in the past year: No  -SS      Does patient have a fear of falling No  -SS         Daily Activities    Primary Language English  -SS         Safety    Are you being hurt, hit, or frightened by anyone at home or in your life? No  -SS      Are you being neglected by a caregiver No  -SS        User Key  (r) = Recorded By, (t) = Taken By, (c) = Cosigned By    Initials Name Provider Type    SS Roosevelt Aguilar, PT DPT Physical Therapist                PT Ortho     Row Name 08/14/18 1400       Subjective Comments    Subjective Comments see Therapy Patient History  -SS       Precautions and Contraindications    Precautions/Limitations no known precautions/limitations  -SS       Subjective Pain    Able to rate subjective pain? yes  -SS    Pre-Treatment Pain Level 2  -SS    Post-Treatment Pain Level 1  -SS       Posture/Observations    Posture/Observations Comments Mild antalgia noted.  -SS       Special Tests/Palpation    Special Tests/Palpation --   positive shotgun  -SS       Hip/Thigh Palpation    Greater Trochanter --   non-tender  -SS    Anterior Capsule Right:;Tender  -SS    Iliopsoas Right:;Tender  -SS    Gluteus Yonathan --   non-tender  -SS    Gluteus Medius --   non-tender  -SS    Gluteus Minimus --   non-tender  -SS    Piriformis --   non-tender  -SS    Quadriceps Right:;Tender;Guarded/taut   vastus lateralis  -SS    Biceps Femoris --   non-tender  -SS    Semimembranosus --   non-tender  -SS    Semitendinosus --   non-tender  -SS    Adductors Right:;Tender;Guarded/taut  -SS    ASIS  Right:;Tender   TTP right inguinal fold  -SS    Sartorius Right:;Tender;Guarded/taut  -SS       Hip Special Tests    TANI (hip vs SI pathology) --   pain medially  -SS    Hip scour test (labral vs hip pathology) Negative  -SS       Right Lower Ext    Rt Hip ABduction AROM 40; pulling lateral quad  -SS    Rt Hip ADduction AROM 20; pulling lateral quad  -SS    Rt Hip Extension AROM 18; pain lateral quad  -SS    Rt Hip Flexion AROM 90; pulling sensation lateral hip  -SS    Rt Hip External Rotation AROM 30; mild groin pain  -SS    Rt Hip Internal Rotation AROM 35; groin pain  -SS       Right Hip (Manual Muscle Testing)    Right Hip Manual Muscle Testing (MMT) flexion;extension;abduction;adduction;external rotation;internal rotation  -SS    MMT: Flexion, Right Hip flexion  -SS    MMT, Gross Movement: Right Hip Flexion (5/5) normal   mild pain/tight  -SS    MMT: Extension, Right Hip extension  -SS    MMT, Gross Movement: Right Hip Extension (5/5) normal  -SS    MMT: ABduction, Right Hip abduction  -SS    MMT, Gross Movement: Right Hip ABduction (5/5) normal   pain lateral quad  -SS    MMT, Gross Movement: Right Hip ADduction (5/5) normal  -SS    MMT, Gross Movement: Right Hip Internal (Medial) Rotation (5/5) normal  -SS    MMT, Gross Movement: Right Hip External (Lateral) Rotation (5/5) normal  -SS       Right Knee (Manual Muscle Testing)    Right Knee Manual Muscle Testing (MMT) extension;flexion  -SS    MMT: Extension, Right Knee extension  -SS    MMT, Gross Movement: Right Knee Extension (5/5) normal  -SS    MMT: Flexion, Right Knee flexion  -SS    MMT, Gross Movement: Right Knee Flexion (5/5) normal  -SS      User Key  (r) = Recorded By, (t) = Taken By, (c) = Cosigned By    Initials Name Provider Type    Roosevelt Lobato PT DPT Physical Therapist                      Therapy Education  Education Details: lunge hip flexor stretch, lunge hip adductor stretch, lunge HS/glut stretch  Given: HEP  Program:  New  How Provided: Verbal, Demonstration  Provided to: Patient  Level of Understanding: Verbalized, Demonstrated           PT OP Goals     Row Name 08/14/18 1400          PT Short Term Goals    STG Date to Achieve --   deferred  -SS        Long Term Goals    LTG Date to Achieve 09/04/18  -SS     LTG 1 Independent with HEP  -SS     LTG 2 Return to work  -     LTG 3 Return to PLOF  -     LTG 4 Minimal to no pain with household and work activities  -        Time Calculation    PT Goal Re-Cert Due Date 09/04/18  -       User Key  (r) = Recorded By, (t) = Taken By, (c) = Cosigned By    Initials Name Provider Type    SS Roosevelt Aguilar, PT DPT Physical Therapist                PT Assessment/Plan     Row Name 08/14/18 1400          PT Assessment    Functional Limitations Impaired gait;Limitation in home management;Limitations in community activities;Limitations in functional capacity and performance;Performance in leisure activities;Performance in self-care ADL;Performance in work activities  -     Impairments Pain;Gait;Range of motion  -     Assessment Comments Patient has possible groin/hip adductor strain or pelvic malalignment. Decreased pain and improved quality of motion after ME shotgun.  -     Rehab Potential Good  -     Patient/caregiver participated in establishment of treatment plan and goals Yes  -     Patient would benefit from skilled therapy intervention Yes  -SS        PT Plan    PT Frequency 2x/week  -     Predicted Duration of Therapy Intervention (Therapy Eval) 3-4 weeks  -     Planned CPT's? PT EVAL MOD COMPLEXITY: 92731;PT THER PROC EA 15 MIN: 97439;PT MANUAL THERAPY EA 15 MIN: 77024;PT HOT OR COLD PACK TREAT MCARE;PT ELECTRICAL STIM UNATTEND: ;PT THER SUPP EA 15 MIN  -     PT Plan Comments ROM, stretching, strengthening, joint mobilization, ME, dry needling, ice with or without IFC estim for pain  -       User Key  (r) = Recorded By, (t) = Taken By, (c) = Cosigned  By    Initials Name Provider Type    Roosevelt Lobato, PT DPT Physical Therapist                 Manual Rx (last 36 hours)      Manual Treatments     Row Name 08/14/18 1400             Manual Rx 1    Manual Rx 1 Type ME shotgun  -SS        User Key  (r) = Recorded By, (t) = Taken By, (c) = Cosigned By    Initials Name Provider Type    Roosevelt Lobato, PT DPT Physical Therapist                      Outcome Measure Options: Lower Extremity Functional Scale (LEFS)  Lower Extremity Functional Index  Any of your usual work, housework or school activities: Moderate difficulty  Your usual hobbies, recreational or sporting activities: Moderate difficulty  Getting into or out of the bath: No difficulty  Walking between rooms: No difficulty  Putting on your shoes or socks: No difficulty  Squatting: No difficulty  Lifting an object, like a bag of groceries from the floor: A little bit of difficulty  Performing light activities around your home: No difficulty  Performing heavy activities around your home: No difficulty  Getting into or out of a car: A little bit of difficulty  Walking 2 blocks: Moderate difficulty  Walking a mile: Moderate difficulty  Going up or down 10 stairs (about 1 flight of stairs): Quite a bit of difficulty  Standing for 1 hour: Moderate difficulty  Sitting for 1 hour: A little bit of difficulty  Running on even ground: Moderate difficulty  Running on uneven ground: Quite a bit of difficulty  Making sharp turns while running fast: Quite a bit of difficulty  Hopping: Quite a bit of difficulty  Rolling over in bed: Quite a bit of difficulty  Total: 50      Time Calculation:         Start Time: 1432  Stop Time: 1511  Time Calculation (min): 39 min     Therapy Charges for Today     Code Description Service Date Service Provider Modifiers Qty    65799317654  PT EVAL MOD COMPLEXITY 3 8/14/2018 Roosevelt Aguilar, PT DPT GP 1                   Roosevelt Aguilar, PT, DPT,  CHT  8/14/2018

## 2018-08-22 ENCOUNTER — HOSPITAL ENCOUNTER (OUTPATIENT)
Dept: PHYSICAL THERAPY | Facility: HOSPITAL | Age: 28
Setting detail: THERAPIES SERIES
Discharge: HOME | End: 2018-08-22

## 2018-08-22 DIAGNOSIS — S76.011D STRAIN OF RIGHT HIP ADDUCTOR MUSCLE, SUBSEQUENT ENCOUNTER: ICD-10-CM

## 2018-08-22 DIAGNOSIS — R10.31 RIGHT GROIN PAIN: Primary | ICD-10-CM

## 2018-08-22 DIAGNOSIS — M25.551 PAIN OF RIGHT HIP JOINT: ICD-10-CM

## 2018-08-22 PROCEDURE — 97110 THERAPEUTIC EXERCISES: CPT

## 2018-08-22 PROCEDURE — G0283 ELEC STIM OTHER THAN WOUND: HCPCS

## 2018-08-22 NOTE — THERAPY TREATMENT NOTE
Outpatient Physical Therapy Ortho Treatment Note  [facility]     Patient Name: Roosevelt Ochoa  : 1990  MRN: [id number]  Today's Date: 2018      Visit Date: 2018     Subjective Improvement 0  Visits 2/2  Visits approved 8 from 2018 to 2018  RTMD PRN  Recert Date 2018    R Groin pain/hip Adductor strain/pelvic malaglignemtn    Visit Dx:    ICD-10-CM ICD-9-CM   1. Right groin pain R10.31 789.09   2. Pain of right hip joint M25.551 719.45   3. Strain of right hip adductor muscle, subsequent encounter S76.011D V58.89     843.8       Patient Active Problem List   Diagnosis   • Marijuana use   • BMI 31.0-31.9,adult   • Essential hypertension, benign   • History of retinal tear        Past Medical History:   Diagnosis Date   • Blood chemistry abnormality    • Diarrhea    • Elevated blood pressure reading without diagnosis of hypertension    • Foot pain     probable tendonitis left foot      • Hip pain    • Hyperglycemia    • Hypertension    • Nausea and vomiting         Past Surgical History:   Procedure Laterality Date   • DENTAL PROCEDURE               PT Ortho     Row Name 18 1100       Subjective Comments    Subjective Comments Patient states he has been doing his stretching at home.  reports that one leg feels longer than the other when he does leg pressess. also when he feels he is sitting on something.  -CP       Subjective Pain    Post-Treatment Pain Level 1  -CP      User Key  (r) = Recorded By, (t) = Taken By, (c) = Cosigned By    Initials Name Provider Type    CP Patt Jenkins, PTA Physical Therapy Assistant                            PT Assessment/Plan     Row Name 18 1612          PT Assessment    Assessment Comments ME did correct rotations.  However, patient did report 1/10 right groin pain afterward  -CP        PT Plan    PT Frequency 2x/week  -CP     Predicted Duration of Therapy Intervention (Therapy Eval) 3-4 weeks  -CP     PT Plan Comments  cont with poc,  cont with manual  -CP       User Key  (r) = Recorded By, (t) = Taken By, (c) = Cosigned By    Initials Name Provider Type    CP Patt Jenkins, PTA Physical Therapy Assistant                Modalities     Row Name 08/22/18 1100             Ice    Ice Applied Yes  -CP      Location low back  -CP      Rx Minutes 15 mins  -CP      Ice S/P Rx Yes  -CP         ELECTRICAL STIMULATION    Attended/Unattended Unattended  -CP      Stimulation Type IFC  -CP      Location/Electrode Placement/Other low back  -CP      61783 - PT Electrical Stimulation (Manual) Minutes 15  -CP        User Key  (r) = Recorded By, (t) = Taken By, (c) = Cosigned By    Initials Name Provider Type    CP Patt Jenkins, PTA Physical Therapy Assistant                Exercises     Row Name 08/22/18 1100             Subjective Comments    Subjective Comments Patient states he has been doing his stretching at home.  reports that one leg feels longer than the other when he does leg pressess. also when he feels he is sitting on something.  -CP         Subjective Pain    Able to rate subjective pain? yes  -CP      Pre-Treatment Pain Level 2  -CP      Post-Treatment Pain Level 1  -CP         Exercise 1    Exercise Name 1 Pro II level 4  -CP      Time 1 10  -CP         Exercise 2    Exercise Name 2 incline stretch  -CP      Sets 2 3  -CP      Time 2 30  -CP         Exercise 3    Exercise Name 3 Standing HS Stretch  -CP      Sets 3 3  -CP      Time 3 30  -CP         Exercise 4    Exercise Name 4 standing hip fl stretch  -CP      Sets 4 3  -CP      Time 4 30  -CP         Exercise 5    Exercise Name 5 seated piriformis stretch  -CP      Sets 5 3  -CP      Time 5 30  -CP         Exercise 6    Exercise Name 6 PROM right hip  -CP      Time 6 4  -CP      Additional Comments IR/ER  -CP         Exercise 7    Exercise Name 7 see manual  -CP        User Key  (r) = Recorded By, (t) = Taken By, (c) = Cosigned By    Initials Name Provider Type    CP  Patt Jenkins PTA Physical Therapy Assistant                        Manual Rx (last 36 hours)      Manual Treatments     Row Name 08/22/18 1500             Manual Rx 1    Manual Rx 1 Location right hip  -CP      Manual Rx 1 Type post jt mobs  -CP      Manual Rx 1 Grade I and II  -CP      Manual Rx 1 Duration 2  -CP         Manual Rx 2    Manual Rx 2 Location right SI  -CP      Manual Rx 2 Type ME to correct right ant rotation  -CP      Manual Rx 2 Duration 3  -CP         Manual Rx 3    Manual Rx 3 Type positive ME shotgun  -CP         Manual Rx 4    Manual Rx 4 Type ME to correct right outflare  -CP         Manual Rx 5    Manual Rx 5 Location sacral rocking  -CP        User Key  (r) = Recorded By, (t) = Taken By, (c) = Cosigned By    Initials Name Provider Type    Patt Sparrow PTA Physical Therapy Assistant                PT OP Goals     Row Name 08/22/18 1600          PT Short Term Goals    STG Date to Achieve --   deferred  -CP        Long Term Goals    LTG Date to Achieve 09/04/18  -CP     LTG 1 Independent with HEP  -CP     LTG 1 Progress Ongoing  -CP     LTG 2 Return to work  -CP     LTG 2 Progress Not Met  -CP     LTG 3 Return to PLOF  -CP     LTG 3 Progress Not Met  -CP     LTG 4 Minimal to no pain with household and work activities  -CP     LTG 4 Progress Not Met  -CP       User Key  (r) = Recorded By, (t) = Taken By, (c) = Cosigned By    Initials Name Provider Type    CP Patt Jenkins, JACQUI Physical Therapy Assistant                         Time Calculation:   Start Time: 1103  Stop Time: 1200  Time Calculation (min): 57 min  Total Timed Code Minutes- PT: 40 minute(s)  Therapy Suggested Charges     Code   Minutes Charges    85993 (CPT®) Hc Pt Neuromusc Re Education Ea 15 Min      84929 (CPT®) Hc Pt Ther Proc Ea 15 Min      41298 (CPT®) Hc Gait Training Ea 15 Min      09536 (CPT®) Hc Pt Therapeutic Act Ea 15 Min      09981 (CPT®) Hc Pt Manual Therapy Ea 15 Min      85094 (CPT®) Hc Pt Ther  Massage- Per 15 Min      38524 (CPT®) Hc Pt Iontophoresis Ea 15 Min      30083 (CPT®) Hc Pt Elec Stim Ea-Per 15 Min 15 1    36286 (CPT®) Hc Pt Ultrasound Ea 15 Min      58198 (CPT®) Hc Pt Self Care/Mgmt/Train Ea 15 Min      27521 (CPT®) Hc Pt Prosthetic (S) Train Initial Encounter, Each 15 Min      65339 (CPT®) Hc Orthotic(S) Mgmt/Train Initial Encounter, Each 15min      52463 (CPT®) Hc Pt Aquatic Therapy Ea 15 Min      13246 (CPT®) Hc Pt Orthotic(S)/Prosthetic(S) Encounter, Each 15 Min      Total  15 1        Therapy Charges for Today     Code Description Service Date Service Provider Modifiers Qty    40419862090 HC PT THER PROC EA 15 MIN 8/22/2018 Patt Jenkins, PTA GP 3    67509585050 HC PT ELECTRICAL STIM UNATTENDED 8/22/2018 Patt Jenkins, PTA  1    56494522723 HC PT THER SUPP EA 15 MIN 8/22/2018 Patt Jenkins, PTA GP 1                    Patt Jenkins, JACQUI  8/22/2018

## 2018-08-23 ENCOUNTER — HOSPITAL ENCOUNTER (OUTPATIENT)
Dept: PHYSICAL THERAPY | Facility: HOSPITAL | Age: 28
Setting detail: THERAPIES SERIES
Discharge: HOME | End: 2018-08-23

## 2018-08-23 ENCOUNTER — OFFICE VISIT (OUTPATIENT)
Dept: SLEEP MEDICINE | Facility: HOSPITAL | Age: 28
End: 2018-08-23

## 2018-08-23 VITALS
OXYGEN SATURATION: 98 % | WEIGHT: 226 LBS | HEIGHT: 71 IN | SYSTOLIC BLOOD PRESSURE: 142 MMHG | DIASTOLIC BLOOD PRESSURE: 84 MMHG | BODY MASS INDEX: 31.64 KG/M2 | HEART RATE: 82 BPM

## 2018-08-23 DIAGNOSIS — G47.419 NARCOLEPSY WITHOUT CATAPLEXY: ICD-10-CM

## 2018-08-23 DIAGNOSIS — R10.31 RIGHT GROIN PAIN: Primary | ICD-10-CM

## 2018-08-23 DIAGNOSIS — G47.33 OBSTRUCTIVE SLEEP APNEA, ADULT: Primary | ICD-10-CM

## 2018-08-23 DIAGNOSIS — S76.011D STRAIN OF RIGHT HIP ADDUCTOR MUSCLE, SUBSEQUENT ENCOUNTER: ICD-10-CM

## 2018-08-23 DIAGNOSIS — G47.411 NARCOLEPSY WITH CATAPLEXY: ICD-10-CM

## 2018-08-23 DIAGNOSIS — M25.551 PAIN OF RIGHT HIP JOINT: ICD-10-CM

## 2018-08-23 PROCEDURE — 99203 OFFICE O/P NEW LOW 30 MIN: CPT | Performed by: INTERNAL MEDICINE

## 2018-08-23 PROCEDURE — 97110 THERAPEUTIC EXERCISES: CPT

## 2018-08-23 NOTE — PROGRESS NOTES
New Patient Sleep Medicine Consultation    Encounter Date: 8/23/2018         Patient's PCP: Jhonathan Nielsen MD  Referring provider: No ref. provider found  Reason for consultation: excessive daytime sleepiness and unrefreshing sleep    Roosevelt Ochoa is a 28 y.o. male who admits to snoring, unrestful sleep, High blod pressure, gasping during sleep, excessive daytime sleepiness, stop breathing during sleep, Disturbed or restless sleep, memory loss, choking duing sleep, Up to the bathroom at night, night sweats, abnormal or vilent dreams and difficulty staying asleep. He denies sleep paralysis or hypnagogic hallucinations. He gets sleepy after any type with his girlfriend, has difficulty holding things in his hands hearing funny joke.  His sister was recently diagnosed with idiopathic hypersomnolence.  Her initial test was positive for narcolepsy however she did test positive for marijuana at that time.  His bedtime is ~ 2200. He  falls asleep after 1 minutes, and is up 5-6 times per night. He wakes up ~ 0500. He endorses 7-8 hours of sleep. He drinks 0 cups of coffee, 0 teas, and 0 sodas per day. He drinks 3 alcoholic beverages per week. He is not a current smoker, but has used marijuana up until June 2018. He does not take sedatives or hypnotics. He has some sleepiness with driving. He naps everyday.    [place] - 19    Past Medical History:   Diagnosis Date   • Blood chemistry abnormality    • Diarrhea    • Elevated blood pressure reading without diagnosis of hypertension    • Foot pain     probable tendonitis left foot      • Hip pain    • Hyperglycemia    • Hypertension    • Nausea and vomiting      Social History     Social History   • Marital status: Single     Spouse name: N/A   • Number of children: N/A   • Years of education: N/A     Occupational History   • Not on file.     Social History Main Topics   • Smoking status: Former Smoker   • Smokeless tobacco: Never Used   • Alcohol use 12.0 oz/week     20  "Shots of liquor per week      Comment: occasionally   • Drug use: No   • Sexual activity: Yes     Other Topics Concern   • Not on file     Social History Narrative   • No narrative on file     Family History   Problem Relation Age of Onset   • Hypertension Mother    single  2 kids  Day  - concrete, etc.  4 brothers and 5 sisters  Other family history + for: diabetes  Smoking history: smoked recreational marijuana only  FH of sleep disorders: sister with IH    Review of Systems:  Constitutional: negative  Eyes: negative  Ears, nose, mouth, throat, and face: negative  Respiratory: negative  Cardiovascular: negative  Gastrointestinal: negative  Genitourinary:negative  Integument/breast: negative  Hematologic/lymphatic: negative  Musculoskeletal:negative  Neurological: negative  Behavioral/Psych: negative  Endocrine: negative  Allergic/Immunologic: negative Patient advised to discuss any positive ROS with PCP.      Vitals:    08/23/18 1119   BP: 142/84   Pulse: 82   SpO2: 98%     Body mass index is 31.52 kg/m². Patient's Body mass index is 31.52 kg/m². BMI is above normal parameters. Recommendations include: referral to primary care.  Neck circumference: 16.25\"            General: Alert. Cooperative. Well developed. No acute distress.             Head:  Normocephalic. Symmetrical. Atraumatic.              Eyes: Sclera clear. No icterus. PERRLA. Normal EOM.             Ears: No deformities. Normal hearing.             Nose: No septal deviation. No drainage.          Throat: No oral lesions. No thrush. Moist mucous membranes.    Tongue is enlarged    Dentition is fair       Pharynx: Posterior pharyngeal pillars are narrow with very high arched palate    Mallampati score of III (soft and hard palate and base of uvula visible)    Pharynx is normal with unrermarkable tonsils   Chest Wall:  Normal shape. Symmetric expansion with respiration. No tenderness.             Neck:  Trachea midline.           Lungs:  Clear to " auscultation bilaterally. No wheezes. No rhonchi. No rales. Respirations regular, even and unlabored.            Heart:  Regular rhythm and normal rate. Normal S1 and S2. No murmur.     Abdomen:  Soft, non-tender and non-distended. Normal bowel sounds. No masses.  Extremities:  Moves all extremities well. No edema.           Pulses: Pulses palpable and equal bilaterally.               Skin: Dry. Intact. No bleeding. No rash.           Neuro: Moves all 4 extremities and cranial nerves grossly intact.  Psychiatric: Normal mood and affect.      Current Outpatient Prescriptions:   •  albuterol (PROVENTIL HFA;VENTOLIN HFA) 108 (90 Base) MCG/ACT inhaler, Inhale 2 puffs Every 4 (Four) Hours As Needed for Wheezing., Disp: 1 inhaler, Rfl: 2  •  amLODIPine (NORVASC) 10 MG tablet, Take 1 tablet by mouth Daily. For bp  New dose   Finish previous, Disp: 90 tablet, Rfl: 3  •  chlorthalidone (HYGROTON) 25 MG tablet, Take 25 mg by mouth Daily., Disp: , Rfl:   •  cyproheptadine (PERIACTIN) 4 MG tablet, 1 tid prn congestion, Disp: 20 tablet, Rfl: 1  •  doxycycline (VIBRAMYCIN) 100 MG capsule, 1  Bid with food x 10d, Disp: 20 capsule, Rfl: 0  •  fluticasone (FLONASE) 50 MCG/ACT nasal spray, 2 sprays into the nostril(s) as directed by provider Daily. X 2-3wks then prn, Disp: 1 bottle, Rfl: 11  •  meloxicam (MOBIC) 7.5 MG tablet, 1 bid x 5d then prn elbow, Disp: 30 tablet, Rfl: 1  •  predniSONE (DELTASONE) 20 MG tablet, 2qdx5, Disp: 10 tablet, Rfl: 0  •  traMADol (ULTRAM) 50 MG tablet, Take 1 tablet by mouth Every 6 (Six) Hours As Needed for Moderate Pain ., Disp: 15 tablet, Rfl: 0    Contraindications to home sleep test: Suspicion of narcolepsy, or idiopathic hypersomnolence    ASSESSMENT:  1. Excessive daytime sleepiness presumed type I narcolepsy  1. Family history (+) for IH/narcolepsy  2. (+) cataplexy  3. Check PSG/MSLT sleep study  4. Continue no marijuana    RTC 2 weeks after testing         This document has been electronically  signed by Bhargav Etienne MD on August 23, 2018         CC: Jhonathan Nielsen MD          No ref. provider found

## 2018-08-23 NOTE — THERAPY TREATMENT NOTE
Outpatient Physical Therapy Ortho Treatment Note  [facility]     Patient Name: Roosevelt Ochoa  : 1990  MRN: [id number]  Today's Date: 2018      Visit Date: 2018  Subjective Improvement: 65%  Visit Number:   3/3  Recert Date:   18   MD Visit:   PRN  Total Approved Visits:  8 visits from 18 to 18    PT Diagnosis:  R Groin pain/hip adductor strain/pelvic malalignment  Visit Dx:    ICD-10-CM ICD-9-CM   1. Right groin pain R10.31 789.09   2. Pain of right hip joint M25.551 719.45   3. Strain of right hip adductor muscle, subsequent encounter S76.011D V58.89     843.8       Patient Active Problem List   Diagnosis   • Marijuana use   • BMI 31.0-31.9,adult   • Essential hypertension, benign   • History of retinal tear        Past Medical History:   Diagnosis Date   • Blood chemistry abnormality    • Diarrhea    • Elevated blood pressure reading without diagnosis of hypertension    • Foot pain     probable tendonitis left foot      • Hip pain    • Hyperglycemia    • Hypertension    • Nausea and vomiting         Past Surgical History:   Procedure Laterality Date   • DENTAL PROCEDURE               PT Ortho     Row Name 18 1500       Posture/Observations    Posture/Observations Comments LL equal, ASIS equal.  Min tenderness R ASIS. No gaurding.  -BB    Row Name 18 1100       Subjective Comments    Subjective Comments Patient states he has been doing his stretching at home.  reports that one leg feels longer than the other when he does leg pressess. also when he feels he is sitting on something.  -CP       Subjective Pain    Post-Treatment Pain Level 1  -CP      User Key  (r) = Recorded By, (t) = Taken By, (c) = Cosigned By    Initials Name Provider Type    BB Kellee Shannon, JACQUI Physical Therapy Assistant    Patt Sparrow, JACQUI Physical Therapy Assistant                            PT Assessment/Plan     Row Name 18 1500          PT Assessment     Assessment Comments No manual needed to SI this date.  Making good progress.  Slight increase in soreness after PT. Provided HEP sheets.    No modalities due to min pain this date.   -BB        PT Plan    PT Frequency 2x/week  -BB     Predicted Duration of Therapy Intervention (Therapy Eval) 3-4 weeks  -BB     PT Plan Comments Progress as able.  Monitor alignment.  -BB       User Key  (r) = Recorded By, (t) = Taken By, (c) = Cosigned By    Initials Name Provider Type    Kellee Mascorro PTA Physical Therapy Assistant                Modalities     Row Name 08/23/18 1500             Ice    Patient reports will apply ice at home to involved area --   ice to go  -BB        User Key  (r) = Recorded By, (t) = Taken By, (c) = Cosigned By    Initials Name Provider Type    Kellee Mascorro PTA Physical Therapy Assistant                Exercises     Row Name 08/23/18 1500             Subjective Comments    Subjective Comments States it is better.    -BB         Subjective Pain    Able to rate subjective pain? yes  -BB      Pre-Treatment Pain Level 0  -BB      Post-Treatment Pain Level 1  -BB         Exercise 1    Exercise Name 1 Pro II level 4 seat 12 strength and ROM  -BB      Time 1 10  -BB         Exercise 2    Exercise Name 2 Incline stretch  -BB      Reps 2 3  -BB      Time 2 30  -BB         Exercise 3    Exercise Name 3 HS stretch  -BB      Reps 3 3  -BB      Time 3 30  -BB         Exercise 4    Exercise Name 4 hip flexor stretch on step   -BB      Reps 4 3  -BB      Time 4 30  -BB         Exercise 5    Exercise Name 5 standing add stretch  -BB      Reps 5 3  -BB      Time 5 30  -BB         Exercise 6    Exercise Name 6 bridges  -BB      Sets 6 2  -BB      Reps 6 10  -BB         Exercise 7    Exercise Name 7 long leg distraction R  -BB      Reps 7 3  -BB      Time 7 30  -BB         Exercise 8    Exercise Name 8 bent knee fall outs  -BB      Sets 8 2  -BB      Reps 8 10  -BB         Exercise 9    Exercise Name  9 prone hip IR/ER  -BB         Exercise 10    Exercise Name 10 standing HIP abd/ext R only  -BB      Sets 10 2  -BB      Reps 10 10  -BB        User Key  (r) = Recorded By, (t) = Taken By, (c) = Cosigned By    Initials Name Provider Type    Kellee Mascorro PTA Physical Therapy Assistant                        Manual Rx (last 36 hours)      Manual Treatments     Row Name 08/22/18 1500             Manual Rx 1    Manual Rx 1 Location right hip  -CP      Manual Rx 1 Type post jt mobs  -CP      Manual Rx 1 Grade I and II  -CP      Manual Rx 1 Duration 2  -CP         Manual Rx 2    Manual Rx 2 Location right SI  -CP      Manual Rx 2 Type ME to correct right ant rotation  -CP      Manual Rx 2 Duration 3  -CP         Manual Rx 3    Manual Rx 3 Type positive ME shotgun  -CP         Manual Rx 4    Manual Rx 4 Type ME to correct right outflare  -CP         Manual Rx 5    Manual Rx 5 Location sacral rocking  -CP        User Key  (r) = Recorded By, (t) = Taken By, (c) = Cosigned By    Initials Name Provider Type    CP Patt Jenkins, JACQUI Physical Therapy Assistant                PT OP Goals     Row Name 08/23/18 1500          PT Short Term Goals    STG Date to Achieve --   deferred  -BB        Long Term Goals    LTG Date to Achieve 09/04/18  -BB     LTG 1 Independent with HEP  -BB     LTG 1 Progress Ongoing  -BB     LTG 2 Return to work  -BB     LTG 2 Progress Progressing  -BB     LTG 3 Return to PLOF  -BB     LTG 3 Progress Progressing  -BB     LTG 4 Minimal to no pain with household and work activities  -BB     LTG 4 Progress Progressing  -BB        Time Calculation    PT Goal Re-Cert Due Date 09/04/18  -BB       User Key  (r) = Recorded By, (t) = Taken By, (c) = Cosigned By    Initials Name Provider Type    Kellee Mascorro PTA Physical Therapy Assistant          Therapy Education  Given: HEP  Program: New  How Provided: Verbal, Demonstration, Written (provided HEP sheets this date)  Provided to: Patient  Level  of Understanding: Verbalized, Demonstrated              Time Calculation:   Start Time: 1516  Stop Time: 1609  Time Calculation (min): 53 min  Total Timed Code Minutes- PT: 53 minute(s)  Therapy Suggested Charges     Code   Minutes Charges    None           Therapy Charges for Today     Code Description Service Date Service Provider Modifiers Qty    21467231800 HC PT THER PROC EA 15 MIN 8/23/2018 Kellee Shannon, PTA GP 4                    Kellee Shannon PTA  8/23/2018

## 2018-08-29 ENCOUNTER — HOSPITAL ENCOUNTER (OUTPATIENT)
Dept: PHYSICAL THERAPY | Facility: HOSPITAL | Age: 28
Setting detail: THERAPIES SERIES
Discharge: HOME | End: 2018-08-29

## 2018-08-29 ENCOUNTER — OFFICE VISIT (OUTPATIENT)
Dept: FAMILY MEDICINE CLINIC | Facility: CLINIC | Age: 28
End: 2018-08-29

## 2018-08-29 VITALS
BODY MASS INDEX: 30.8 KG/M2 | DIASTOLIC BLOOD PRESSURE: 82 MMHG | WEIGHT: 220 LBS | SYSTOLIC BLOOD PRESSURE: 140 MMHG | HEIGHT: 71 IN

## 2018-08-29 DIAGNOSIS — I10 ESSENTIAL HYPERTENSION, BENIGN: Chronic | ICD-10-CM

## 2018-08-29 DIAGNOSIS — S76.301D HAMSTRING INJURY, RIGHT, SUBSEQUENT ENCOUNTER: Primary | ICD-10-CM

## 2018-08-29 DIAGNOSIS — S76.011D STRAIN OF RIGHT HIP ADDUCTOR MUSCLE, SUBSEQUENT ENCOUNTER: ICD-10-CM

## 2018-08-29 DIAGNOSIS — M25.551 PAIN OF RIGHT HIP JOINT: ICD-10-CM

## 2018-08-29 DIAGNOSIS — R10.31 RIGHT GROIN PAIN: Primary | ICD-10-CM

## 2018-08-29 PROCEDURE — 99213 OFFICE O/P EST LOW 20 MIN: CPT | Performed by: FAMILY MEDICINE

## 2018-08-29 PROCEDURE — 97110 THERAPEUTIC EXERCISES: CPT

## 2018-08-29 NOTE — THERAPY TREATMENT NOTE
Outpatient Physical Therapy Ortho Treatment Note  [facility]     Patient Name: Roosevelt Ochoa  : 1990  MRN: [id number]  Today's Date: 2018      Visit Date: 2018     Sub imp 100%  Visit / (Eval + 8)  Re 18  MD 18    Visit Dx:    ICD-10-CM ICD-9-CM   1. Right groin pain R10.31 789.09   2. Pain of right hip joint M25.551 719.45   3. Strain of right hip adductor muscle, subsequent encounter S76.011D V58.89     843.8       Patient Active Problem List   Diagnosis   • Marijuana use   • BMI 31.0-31.9,adult   • Essential hypertension, benign   • History of retinal tear        Past Medical History:   Diagnosis Date   • Blood chemistry abnormality    • Diarrhea    • Elevated blood pressure reading without diagnosis of hypertension    • Foot pain     probable tendonitis left foot      • Hip pain    • Hyperglycemia    • Hypertension    • Nausea and vomiting         Past Surgical History:   Procedure Laterality Date   • DENTAL PROCEDURE               PT Ortho     Row Name 18 1100       Posture/Observations    Posture/Observations Comments (P)  LL =, R outflare, + shotgun  -NAHED      User Key  (r) = Recorded By, (t) = Taken By, (c) = Cosigned By    Initials Name Provider Type    Camron Danielson ATC                             PT Assessment/Plan     Row Name 18 1146          PT Assessment    Assessment Comments (P)  Corrected SI post MET. MD note sent with patient  -NAHED        PT Plan    PT Plan Comments (P)  Cont pending MD apt. Pt to see how he feels next week. Will call to schedule if needed  -NAHED       User Key  (r) = Recorded By, (t) = Taken By, (c) = Cosigned By    Initials Name Provider Type    Camron Danielson ATC                 Modalities     Row Name 18 1100             Ice    Location (P)  Ice to go  -NAHED        User Key  (r) = Recorded By, (t) = Taken By, (c) = Cosigned By    Initials Name Provider Type    Camron Danielson ATC                  Exercises     Row Name 08/29/18 1100             Subjective Comments    Subjective Comments (P)  Reports feeling good today. Active earlier this morning with minimal to no pain. MD this afternoon  -NAHED         Subjective Pain    Able to rate subjective pain? (P)  yes  -NAHED      Pre-Treatment Pain Level (P)  0  -NAHED      Post-Treatment Pain Level (P)  1  -NAHED         Exercise 1    Exercise Name 1 (P)  Pro II seat 12  -NAHED      Time 1 (P)  10  -NAHED      Additional Comments (P)  L4  -NAHED         Exercise 2    Exercise Name 2 (P)  Standing Ham stretch  -NAHED      Sets 2 (P)  3  -NAHED      Time 2 (P)  30  -NAHED         Exercise 3    Exercise Name 3 (P)  Standing hip flexor stretch  -NAHED      Sets 3 (P)  3  -NAHED      Time 3 (P)  30  -NAHED         Exercise 4    Exercise Name 4 (P)  Standing Add stretch  -NAHED      Sets 4 (P)  3  -NAHED      Time 4 (P)  30  -NAHED         Exercise 5    Exercise Name 5 (P)  Alignment check-see manual  -NAHED         Exercise 6    Exercise Name 6 (P)  Bridge with ADD  -NAHED      Sets 6 (P)  2  -NAHED      Reps 6 (P)  10  -NAHED         Exercise 7    Exercise Name 7 (P)  BKLL  -NAHED      Sets 7 (P)  2  -NAHED      Reps 7 (P)  10  -NAHED         Exercise 8    Exercise Name 8 (P)  prone glute heel squeeze  -NAHED      Sets 8 (P)  2  -NAHED      Reps 8 (P)  10  -NAHED         Exercise 9    Exercise Name 9 (P)  Prone plank  -NAHED      Reps 9 (P)  3  -NAHED      Additional Comments (P)  40 sec  -NAHED         Exercise 10    Exercise Name 10 (P)  Prone hip ext  -NAHED      Sets 10 (P)  2  -NAHED      Reps 10 (P)  10  -NAHED         Exercise 11    Exercise Name 11 (P)  Quad Hip ext  -NAHED      Sets 11 (P)  1  -NAHED      Reps 11 (P)  10  -NAHED        User Key  (r) = Recorded By, (t) = Taken By, (c) = Cosigned By    Initials Name Provider Type    Camorn Danielson, ATC                         Manual Rx (last 36 hours)      Manual Treatments     Row Name 08/29/18 1100             Manual Rx 1    Manual Rx 1 Location (P)  R hip MET  outglare  -NAHED         Manual Rx 2    Manual Rx 2 Location (P)  Postive shotgun  -NAHED        User Key  (r) = Recorded By, (t) = Taken By, (c) = Cosigned By    Initials Name Provider Type    Camron Danielson ATC                 PT OP Goals     Row Name 08/29/18 1100          PT Short Term Goals    STG Date to Achieve (P)  --   deferred  -NAHED        Long Term Goals    LTG Date to Achieve (P)  09/04/18  -NAHED     LTG 1 (P)  Independent with HEP  -NAHED     LTG 1 Progress (P)  Ongoing  -NAHED     LTG 2 (P)  Return to work  -NAHED     LTG 2 Progress (P)  Progressing  -NAHED     LTG 3 (P)  Return to PLOF  -NAHED     LTG 3 Progress (P)  Progressing  -NAHED     LTG 4 (P)  Minimal to no pain with household and work activities  -NAHED     LTG 4 Progress (P)  Progressing  -NAHED       User Key  (r) = Recorded By, (t) = Taken By, (c) = Cosigned By    Initials Name Provider Type    Camron Danielson ATC           Therapy Education  Given: (P) HEP  Program: (P) New  How Provided: (P) Verbal, Written  Provided to: (P) Patient  Level of Understanding: (P) Verbalized, Demonstrated              Time Calculation:   Start Time: (P) 1100  Stop Time: (P) 1146  Time Calculation (min): (P) 46 min  Total Timed Code Minutes- PT: (P) 46 minute(s)  Therapy Suggested Charges     Code   Minutes Charges    None           Therapy Charges for Today     Code Description Service Date Service Provider Modifiers Qty    65945836317 HC PT THER PROC EA 15 MIN 8/29/2018 Camron Hutton ATC  3                    Camron Hutton ATC  8/29/2018

## 2018-08-29 NOTE — PROGRESS NOTES
Subjective   Roosevelt Ochoa is a 28 y.o. male.     History of Present Illness    reevaluation hamstring injury   physical therapy  90% back to normal is doing some stretching stretching and strengthening etc. pelvic tilt etc.  Is due to return to work on September 4.  Says he feels ready to do same.  Also counseled on watching salt restriction diet and exercise for his blood pressure.    The following portions of the patient's history were reviewed and updated as appropriate: allergies, current medications, past family history, past medical history, past social history, past surgical history and problem list.    Review of Systems   Constitutional: Negative for activity change, appetite change, fatigue and unexpected weight change.   HENT: Negative for trouble swallowing and voice change.    Eyes: Negative for redness and visual disturbance.   Respiratory: Negative for cough and wheezing.    Cardiovascular: Negative for chest pain and palpitations.   Gastrointestinal: Negative for abdominal pain, constipation, diarrhea, nausea and vomiting.   Genitourinary: Negative for urgency.   Musculoskeletal: Negative for joint swelling.   Neurological: Negative for syncope and headaches.   Hematological: Negative for adenopathy.   Psychiatric/Behavioral: Negative for sleep disturbance.       Objective   Physical Exam   Constitutional: He appears well-developed.   HENT:   Head: Normocephalic.   Eyes: Pupils are equal, round, and reactive to light.   Neck: Normal range of motion.   Cardiovascular: Normal rate.    Musculoskeletal:   Normal range of motion back and leg       Assessment/Plan   Roosevelt was seen today for follow-up.    Diagnoses and all orders for this visit:    Hamstring injury, right, subsequent encounter    Essential hypertension, benign       lifestyle measures blood pressure control back to work on the fourth recheck 6 months blood pressure

## 2018-09-11 ENCOUNTER — HOSPITAL ENCOUNTER (OUTPATIENT)
Dept: SLEEP MEDICINE | Facility: HOSPITAL | Age: 28
Discharge: HOME | End: 2018-09-11
Attending: INTERNAL MEDICINE

## 2018-09-11 ENCOUNTER — OFFICE VISIT (OUTPATIENT)
Dept: FAMILY MEDICINE CLINIC | Facility: CLINIC | Age: 28
End: 2018-09-11

## 2018-09-11 VITALS
SYSTOLIC BLOOD PRESSURE: 140 MMHG | WEIGHT: 220 LBS | BODY MASS INDEX: 30.8 KG/M2 | DIASTOLIC BLOOD PRESSURE: 80 MMHG | HEIGHT: 71 IN

## 2018-09-11 DIAGNOSIS — M65.9 TENOSYNOVITIS OF HAND: ICD-10-CM

## 2018-09-11 DIAGNOSIS — M79.644 THUMB PAIN, RIGHT: Primary | ICD-10-CM

## 2018-09-11 PROCEDURE — 99213 OFFICE O/P EST LOW 20 MIN: CPT | Performed by: FAMILY MEDICINE

## 2018-09-11 NOTE — PROGRESS NOTES
Subjective   Roosevelt Ochoa is a 28 y.o. male.     History of Present Illness  requesting evaluation of persistent recurrent right thumb pain at the PIP joint.  States had injury many years ago and comes and goes swells feels like sometimes he gets dislocated and pops back into place.  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  Getting worse over the past several days to weeks.  Left side is clear.    The following portions of the patient's history were reviewed and updated as appropriate: allergies, current medications, past family history, past medical history, past social history, past surgical history and problem list.    Review of Systems   Constitutional: Negative for activity change, appetite change, fatigue and unexpected weight change.   HENT: Negative for trouble swallowing and voice change.    Eyes: Negative for redness and visual disturbance.   Respiratory: Negative for cough and wheezing.    Cardiovascular: Negative for chest pain and palpitations.   Gastrointestinal: Negative for abdominal pain, constipation, diarrhea, nausea and vomiting.   Genitourinary: Negative for urgency.   Musculoskeletal: Positive for joint swelling.   Neurological: Negative for syncope and headaches.   Hematological: Negative for adenopathy.   Psychiatric/Behavioral: Negative for sleep disturbance.       Objective   Physical Exam   Constitutional: He appears well-developed.   HENT:   Head: Normocephalic.   Eyes: Pupils are equal, round, and reactive to light.   Neck: Normal range of motion.   Cardiovascular: Normal rate.    Pulmonary/Chest: Effort normal.   Abdominal: Soft.   Musculoskeletal:   Right hand thumb shows the PIP joint to be dorsally swollen crepitant.  Pain to full range of motion inability to flex past about 75°.  Extension painful past midline.  Wrist is nontender.  Left side is clear.   Psychiatric: He has a normal mood and affect. His speech is normal.       Assessment/Plan   Roosevelt was seen today for  finger injury.    Diagnoses and all orders for this visit:    Thumb pain, right  -     XR finger 2+ vw right    Tenosynovitis of hand  -     XR finger 2+ vw right       Possibilities include chronic subluxation versus occult fracture versus chronic ligamentous injury.  Initial treatment with thumb spica immobilization be used at all times except when bathing written for same x-ray to delineate pathology I's symptomatic relief will follow-up based on x-ray study and/or a referral

## 2018-09-12 ENCOUNTER — APPOINTMENT (OUTPATIENT)
Dept: SLEEP MEDICINE | Facility: HOSPITAL | Age: 28
End: 2018-09-12
Attending: INTERNAL MEDICINE

## 2018-09-12 ENCOUNTER — HOSPITAL ENCOUNTER (OUTPATIENT)
Dept: PHYSICAL THERAPY | Facility: HOSPITAL | Age: 28
Setting detail: THERAPIES SERIES
Discharge: HOME | End: 2018-09-12

## 2018-09-12 DIAGNOSIS — R10.31 RIGHT GROIN PAIN: Primary | ICD-10-CM

## 2018-09-12 DIAGNOSIS — S63.101A: Primary | ICD-10-CM

## 2018-09-12 PROCEDURE — 97140 MANUAL THERAPY 1/> REGIONS: CPT

## 2018-09-12 PROCEDURE — 97110 THERAPEUTIC EXERCISES: CPT

## 2018-09-12 NOTE — THERAPY PROGRESS REPORT/RE-CERT
Outpatient Physical Therapy Ortho Progress Note  [facility]     Patient Name: Roosevelt Ochoa  : 1990  MRN: [id number]  Today's Date: 2018      Visit Date: 2018    Patient Active Problem List   Diagnosis   • Marijuana use   • BMI 31.0-31.9,adult   • Essential hypertension, benign   • History of retinal tear        Past Medical History:   Diagnosis Date   • Blood chemistry abnormality    • Diarrhea    • Elevated blood pressure reading without diagnosis of hypertension    • Foot pain     probable tendonitis left foot      • Hip pain    • Hyperglycemia    • Hypertension    • Nausea and vomiting         Past Surgical History:   Procedure Laterality Date   • DENTAL PROCEDURE         Visit Dx:     ICD-10-CM ICD-9-CM   1. Right groin pain R10.31 789.09                 PT Ortho     Row Name 18 1600       Subjective Comments    Subjective Comments Patient states that he is in a low level pain in both hips.   -MS      User Key  (r) = Recorded By, (t) = Taken By, (c) = Cosigned By    Initials Name Provider Type    Nilson Jain, PT Physical Therapist                      Therapy Education  Education Details: Discussed with patient that he will benefit from restoring mobility in his hips through PROM treatment and HEP stretches           PT OP Goals     Row Name 18 1600          PT Short Term Goals    STG 1 Patient will present with 5/5 hip flexion strength in sitting  -MS     STG 1 Progress New  -MS     STG 2 Passive IR of left hip in supine 90/90 position will be pain free.  -MS     STG 2 Progress New  -MS        Long Term Goals    LTG Date to Achieve 18  -MS     LTG 1 Independent with HEP  -MS     LTG 1 Progress Ongoing  -MS     LTG 2 Return to work  -MS     LTG 2 Progress Progressing  -MS     LTG 3 Return to PLOF  -MS     LTG 3 Progress Progressing  -MS     LTG 4 Minimal to no pain with household and work activities  -MS     LTG 4 Progress Progressing  -MS      "  User Key  (r) = Recorded By, (t) = Taken By, (c) = Cosigned By    Initials Name Provider Type    Nilson Jain, PT Physical Therapist                PT Assessment/Plan     Row Name 09/12/18 9823          PT Assessment    Assessment Comments Patient's hip alignment and previuolsy report LLD appear to be greatly improved. Patient is in a low level of pain but his hip are hypomobile bilaterally in IR and ER. Patient will benefit from skilled physical therapy to improve hip and core strength, and to improve hip mobility, leading to patient's return to PLOF.  -MS     Please refer to paper survey for additional self-reported information Yes  -MS     Rehab Potential Good  -MS     Patient/caregiver participated in establishment of treatment plan and goals Yes  -MS     Patient would benefit from skilled therapy intervention Yes  -MS        PT Plan    PT Frequency 2x/week  -MS     Predicted Duration of Therapy Intervention (Therapy Eval) 3 weeks  -MS     Planned CPT's? PT MANUAL THERAPY EA 15 MIN: 01816;PT THER PROC EA 15 MIN: 95051  -MS     Physical Therapy Interventions (Optional Details) strengthening;stretching;manual therapy techniques;home exercise program;joint mobilization;lumbar stabilization  -MS     PT Plan Comments Continue with manual therapy and therex.  -MS       User Key  (r) = Recorded By, (t) = Taken By, (c) = Cosigned By    Initials Name Provider Type    Nilson Jain, PT Physical Therapist                  Exercises     Row Name 09/12/18 1600             Subjective Comments    Subjective Comments Patient states that he is in a low level pain in both hips.   -MS         Subjective Pain    Able to rate subjective pain? yes  -MS      Post-Treatment Pain Level 1  -MS         Exercise 1    Exercise Name 1 Pro II seat 12, level 3  -MS      Time 1 5 min  -MS         Exercise 2    Exercise Name 2 Isometric hip flexion in supine   -MS      Reps 2 10 B  -MS      Time 2 5 \" hold  -MS         " "Exercise 3    Exercise Name 3 Standing hip flexion  -MS         Exercise 4    Exercise Name 4 PPT  -MS      Reps 4 15  -MS      Time 4 5\" hold  -MS         Exercise 5    Exercise Name 5 Bridge with ball squeeze  -MS      Sets 5 2  -MS      Reps 5 15  -MS      Time 5 3\" hold  -MS         Exercise 6    Exercise Name 6 Bird Dogs with 2 lb ankle weights  -MS      Sets 6 2  -MS      Reps 6 10  -MS         Exercise 7    Exercise Name 7 SLR with 2 lb ankle weights  -MS      Sets 7 2  -MS      Reps 7 15B  -MS         Exercise 9    Exercise Name 9 prone ext  -MS         Exercise 10    Exercise Name 10 Cat-Cow  -MS      Reps 10 15  -MS      Time 10 3 \" hold  -MS        User Key  (r) = Recorded By, (t) = Taken By, (c) = Cosigned By    Initials Name Provider Type    Nilson Jain PT Physical Therapist           Manual Rx (last 36 hours)      Manual Treatments     Row Name 09/12/18 1600             Manual Rx 1    Manual Rx 1 Location Hips  -MS      Manual Rx 1 Type PROM - flex, IR/ER  -MS      Manual Rx 1 Grade 2  -MS      Manual Rx 1 Duration 8 min  -MS        User Key  (r) = Recorded By, (t) = Taken By, (c) = Cosigned By    Initials Name Provider Type    Nilson Jain PT Physical Therapist                                Time Calculation:     Therapy Suggested Charges     Code   Minutes Charges    None             Start Time: 1655  Stop Time: 1748  Time Calculation (min): 53 min     Therapy Charges for Today     Code Description Service Date Service Provider Modifiers Qty    54143806259  PT THER PROC EA 15 MIN 9/12/2018 Nilson Ritchie, PT GP 3    [id number]  PT MANUAL THERAPY EA 15 MIN 9/12/2018 Nilson Ritchie, PT GP 1                    Nilson Ritchie PT  9/12/2018      "

## 2018-09-17 ENCOUNTER — HOSPITAL ENCOUNTER (OUTPATIENT)
Dept: PHYSICAL THERAPY | Facility: HOSPITAL | Age: 28
Setting detail: THERAPIES SERIES
Discharge: HOME | End: 2018-09-17

## 2018-09-17 DIAGNOSIS — M25.551 PAIN OF RIGHT HIP JOINT: ICD-10-CM

## 2018-09-17 DIAGNOSIS — R10.31 RIGHT GROIN PAIN: Primary | ICD-10-CM

## 2018-09-17 DIAGNOSIS — S76.011D STRAIN OF RIGHT HIP ADDUCTOR MUSCLE, SUBSEQUENT ENCOUNTER: ICD-10-CM

## 2018-09-17 PROCEDURE — 97110 THERAPEUTIC EXERCISES: CPT

## 2018-09-17 NOTE — THERAPY TREATMENT NOTE
Outpatient Physical Therapy Ortho Treatment Note  [facility]     Patient Name: Roosevelt Ochoa  : 1990  MRN: [id number]  Today's Date: 2018      Visit Date: 2018     Subjective Improvement: 75%  Attendance:  6/7 (eval + 8 until 18)  Next MD Visit : PRN  Recert Date:  10/03/18      Therapy Diagnosis:  R Groin Pain         Visit Dx:    ICD-10-CM ICD-9-CM   1. Right groin pain R10.31 789.09   2. Pain of right hip joint M25.551 719.45   3. Strain of right hip adductor muscle, subsequent encounter S76.011D V58.89     843.8       Patient Active Problem List   Diagnosis   • Marijuana use   • BMI 31.0-31.9,adult   • Essential hypertension, benign   • History of retinal tear        Past Medical History:   Diagnosis Date   • Blood chemistry abnormality    • Diarrhea    • Elevated blood pressure reading without diagnosis of hypertension    • Foot pain     probable tendonitis left foot      • Hip pain    • Hyperglycemia    • Hypertension    • Nausea and vomiting         Past Surgical History:   Procedure Laterality Date   • DENTAL PROCEDURE               PT Ortho     Row Name 18 2993       Precautions and Contraindications    Precautions/Limitations no known precautions/limitations  -      User Key  (r) = Recorded By, (t) = Taken By, (c) = Cosigned By    Initials Name Provider Type    Sera Galeas PTA Physical Therapy Assistant                            PT Assessment/Plan     Row Name 18 6619          PT Assessment    Assessment Comments added clam shells and figure 4 stretch to HEP; alignment corrected with ME this date.  Also educated on self correction; No pain with exercises performed this date.  -        PT Plan    PT Frequency 2x/week  -RU     Predicted Duration of Therapy Intervention (Therapy Eval) 3 weeks  -     PT Plan Comments Assess self correction techniques at next visit to see if he was able to do if needed at home; 3 more visits after today then d/c to  "independent management; Visits  18  -KH       User Key  (r) = Recorded By, (t) = Taken By, (c) = Cosigned By    Initials Name Provider Type    Sera Galeas PTA Physical Therapy Assistant                    Exercises     Row Name 18 1357             Subjective Comments    Subjective Comments Pt reports that he feels that he is 75% better over all  -KH         Subjective Pain    Able to rate subjective pain? yes  -KH      Pre-Treatment Pain Level 0  -KH      Post-Treatment Pain Level 0  -KH         Exercise 1    Exercise Name 1 pro ll LE strength   -KH      Time 1 10'  -KH      Additional Comments level 4.5  -KH         Exercise 2    Exercise Name 2 standing hamstring stretch  -KH      Sets 2 3  -KH      Time 2 30\"  -KH         Exercise 3    Exercise Name 3 standing hip flexors stretch  -KH      Sets 3 3  -KH      Time 3 30\"  -KH         Exercise 4    Exercise Name 4 checked alignment  -KH      Sets 4 ME to R anterior torsion with shotgun (+)  -KH         Exercise 5    Exercise Name 5 Bridge with ball under feet  -KH      Sets 5 3  -KH      Reps 5 10  -KH         Exercise 6    Exercise Name 6 clam shells with tband  -KH      Sets 6 2  -KH      Reps 6 10  -KH      Additional Comments bilateral GREEN  -KH         Exercise 7    Exercise Name 7 figure 4 stretch with tball  -KH      Sets 7 3  -KH      Time 7 30\"  -KH        User Key  (r) = Recorded By, (t) = Taken By, (c) = Cosigned By    Initials Name Provider Type    Sera Galeas PTA Physical Therapy Assistant                               PT OP Goals     Row Name 18 1357          PT Short Term Goals    STG 1 Patient will present with 5/5 hip flexion strength in sitting  -KH     STG 1 Progress Progressing  -KH     STG 2 Passive IR of left hip in supine 90/90 position will be pain free.  -KH     STG 2 Progress Progressing  -KH        Long Term Goals    LTG Date to Achieve 18  -KH     LTG 1 Independent with HEP  -KH     LTG 1 Progress " Met  -KH     LTG 2 Return to work  -     LTG 2 Progress Met  -KH     LTG 3 Return to PLOF  -     LTG 3 Progress Met  -KH     LTG 4 Minimal to no pain with household and work activities  -     LTG 4 Progress Met  -KH        Time Calculation    PT Goal Re-Cert Due Date 10/03/18  -       User Key  (r) = Recorded By, (t) = Taken By, (c) = Cosigned By    Initials Name Provider Type    Sera Galeas PTA Physical Therapy Assistant                         Time Calculation:   Start Time: 1357  Stop Time: 1435  Time Calculation (min): 38 min  Total Timed Code Minutes- PT: 38 minute(s)  Therapy Suggested Charges     Code   Minutes Charges    None           Therapy Charges for Today     Code Description Service Date Service Provider Modifiers Qty    72978697631 HC PT THER PROC EA 15 MIN 9/17/2018 Sera Mercado PTA GP 3                    Sera Mercado PTA  9/17/2018

## 2018-09-18 ENCOUNTER — APPOINTMENT (OUTPATIENT)
Dept: PHYSICAL THERAPY | Facility: HOSPITAL | Age: 28
End: 2018-09-18

## 2018-09-20 ENCOUNTER — OFFICE VISIT (OUTPATIENT)
Dept: ORTHOPEDIC SURGERY | Facility: CLINIC | Age: 28
End: 2018-09-20

## 2018-09-20 ENCOUNTER — HOSPITAL ENCOUNTER (OUTPATIENT)
Dept: PHYSICAL THERAPY | Facility: HOSPITAL | Age: 28
Setting detail: THERAPIES SERIES
Discharge: HOME | End: 2018-09-20

## 2018-09-20 VITALS — BODY MASS INDEX: 30.94 KG/M2 | HEIGHT: 71 IN | WEIGHT: 221 LBS

## 2018-09-20 DIAGNOSIS — R10.31 RIGHT GROIN PAIN: Primary | ICD-10-CM

## 2018-09-20 DIAGNOSIS — M79.644 PAIN OF RIGHT THUMB: Primary | ICD-10-CM

## 2018-09-20 DIAGNOSIS — S76.011D STRAIN OF RIGHT HIP ADDUCTOR MUSCLE, SUBSEQUENT ENCOUNTER: ICD-10-CM

## 2018-09-20 DIAGNOSIS — M25.551 PAIN OF RIGHT HIP JOINT: ICD-10-CM

## 2018-09-20 DIAGNOSIS — I10 ESSENTIAL HYPERTENSION: ICD-10-CM

## 2018-09-20 PROCEDURE — 99203 OFFICE O/P NEW LOW 30 MIN: CPT | Performed by: ORTHOPAEDIC SURGERY

## 2018-09-20 PROCEDURE — 97140 MANUAL THERAPY 1/> REGIONS: CPT

## 2018-09-20 PROCEDURE — 97110 THERAPEUTIC EXERCISES: CPT

## 2018-09-20 RX ORDER — PANTOPRAZOLE SODIUM 40 MG/1
40 TABLET, DELAYED RELEASE ORAL DAILY
COMMUNITY
End: 2019-05-29 | Stop reason: SDUPTHER

## 2018-09-20 NOTE — PROGRESS NOTES
Roosevelt Ochoa is a 28 y.o. male   Primary provider:  Jhonathan Nielsen MD       Chief Complaint   Patient presents with   • Right Thumb - Pain   • Establish Care       HISTORY OF PRESENT ILLNESS: Patient being seen for right thumb pain. Patient seen at  and x-rays done 9/12/18. He reports no known injury.   3-4 week history of right thumb pain.  He states that it is constant and is a dull aching pain at rest.  He has more severe pain when gripping and using his hand getting to an 8 out of 10 at times.  No reported injury.  He has been using a brace which seems to help some.  No numbness or tingling.  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    Pain   This is a new problem. The current episode started 1 to 4 weeks ago. The problem occurs intermittently. Associated symptoms include coughing, a fever, headaches, joint swelling, nausea, numbness and a sore throat. Associated symptoms comments: Aching, clicking, swelling. Exacerbated by: driving.        CONCURRENT MEDICAL HISTORY:    Past Medical History:   Diagnosis Date   • Asthma    • Blood chemistry abnormality    • Diarrhea    • Elevated blood pressure reading without diagnosis of hypertension    • Foot pain     probable tendonitis left foot      • Hip pain    • Hyperglycemia    • Hypertension    • Nausea and vomiting    • Sickle cell disease (CMS/HCC)    • Sleep apnea        Allergies   Allergen Reactions   • Lisinopril Anaphylaxis         Current Outpatient Prescriptions:   •  amLODIPine (NORVASC) 10 MG tablet, Take 1 tablet by mouth Daily. For bp  New dose   Finish previous, Disp: 90 tablet, Rfl: 3  •  chlorthalidone (HYGROTON) 25 MG tablet, Take 25 mg by mouth Daily., Disp: , Rfl:   •  meloxicam (MOBIC) 7.5 MG tablet, 1 bid x 5d then prn elbow, Disp: 30 tablet, Rfl: 1  •  pantoprazole (PROTONIX) 40 MG EC tablet, Take 40 mg by mouth Daily., Disp: , Rfl:     Past Surgical History:   Procedure  "Laterality Date   • DENTAL PROCEDURE         Family History   Problem Relation Age of Onset   • Hypertension Mother    • Heart disease Other    • Diabetes Other         Social History     Social History   • Marital status: Single     Spouse name: N/A   • Number of children: N/A   • Years of education: N/A     Occupational History   • Not on file.     Social History Main Topics   • Smoking status: Former Smoker   • Smokeless tobacco: Never Used   • Alcohol use 14.4 oz/week     24 Shots of liquor per week      Comment: Patient states he drinks approximately 24 oz per week   • Drug use: No   • Sexual activity: Yes     Other Topics Concern   • Not on file     Social History Narrative   • No narrative on file        Review of Systems   Constitutional: Positive for fever.        Always cold/hot   HENT: Positive for postnasal drip, sore throat and tinnitus.    Eyes: Positive for pain and visual disturbance.   Respiratory: Positive for cough and shortness of breath.    Gastrointestinal: Positive for nausea.   Musculoskeletal: Positive for joint swelling.        Stiffness, muscle pain   Neurological: Positive for dizziness, numbness and headaches.   Psychiatric/Behavioral: Positive for agitation and sleep disturbance.       PHYSICAL EXAMINATION:       Ht 180.3 cm (71\")   Wt 100 kg (221 lb)   BMI 30.82 kg/m²     Physical Exam   Constitutional: He is oriented to person, place, and time. He appears well-developed and well-nourished.   Neurological: He is alert and oriented to person, place, and time.   Psychiatric: He has a normal mood and affect. His behavior is normal. Judgment and thought content normal.       GAIT:     [x]  Normal  []  Antalgic    Assistive device: [x]  None  []  Walker     []  Crutches  []  Cane     []  Wheelchair  []  Stretcher    Right Hand Exam     Tenderness   Right hand tenderness location: Tender at the base of the thumb specifically on the ulnar border of the metacarpophalangeal joint area " did.    Range of Motion   The patient has normal right wrist ROM.     Muscle Strength   : 4/5     Tests   Phalen’s Sign: negative  Tinel’s Sign (Medial Nerve): negative    Other   Erythema: absent  Sensation: normal  Pulse: present    Comments:  Tenderness with valgus stress of the metacarpophalangeal joint of the thumb.      Left Hand Exam     Tenderness   The patient is experiencing no tenderness.         Range of Motion   The patient has normal left wrist ROM.    Muscle Strength   The patient has normal left wrist strength.    Tests   Phalen’s Sign: negative  Tinel’s Sign (Medial Nerve): negative    Other   Erythema: absent  Sensation: normal  Pulse: present              Xr Finger 2+ Vw Right    Result Date: 9/12/2018  Narrative: Patient Name:  MR. TOYIN NEELY Patient ID:  [id number] Ordering:  VIRGINIA VAZQUEZ Attending:  VIRGINIA VAZQUEZ Referring:  VIRGINIA VAZQUEZ ------------------------------------------------ EXAMINATION:  Right thumb INDICATION: Pain and deformity. COMPARISON : None available VIEWS:   3 Images FINDINGS:  3 views of the right thumb demonstrate normal bony outline and density. No fracture or acute abnormality seen.  At the first MCP joint there is slight deformity which may represent possible subluxation. No fracture or erosion seen.         Impression: CONCLUSION:  1.  Possible subluxation at the metacarpophalangeal joint of the thumb. Electronically signed by:  Jose Hickman  9/12/2018 1:07 PM CDT Workstation: [id number]          ASSESSMENT:    Diagnoses and all orders for this visit:    Pain of right thumb  -     MRI hand right wo contrast; Future    Essential hypertension    Other orders  -     pantoprazole (PROTONIX) 40 MG EC tablet; Take 40 mg by mouth Daily.          PLAN    We discussed getting an MRI of his right hand.  He has some instability and tenderness over the ulnar collateral ligament of the metacarpophalangeal joint.  After the MRI we can determine further treatment options and  possibility for surgical intervention.  He will continue using anti-inflammatory medications and continue using the brace for support.    Patient's Body mass index is 30.82 kg/m². BMI is above normal parameters. Recommendations include: exercise counseling and nutrition counseling.      Return for recheck for MRI results.    Arpit Brand MD

## 2018-09-20 NOTE — THERAPY TREATMENT NOTE
Outpatient Physical Therapy Ortho Treatment Note  [facility]     Patient Name: Roosevelt Ochoa  : 1990  MRN: [id number]  Today's Date: 2018      Visit Date: 2018    Visit Dx:    ICD-10-CM ICD-9-CM   1. Right groin pain R10.31 789.09   2. Pain of right hip joint M25.551 719.45   3. Strain of right hip adductor muscle, subsequent encounter S76.011D V58.89     843.8       Patient Active Problem List   Diagnosis   • Marijuana use   • BMI 31.0-31.9,adult   • Essential hypertension, benign   • History of retinal tear   • Pain of right thumb        Past Medical History:   Diagnosis Date   • Asthma    • Blood chemistry abnormality    • Diarrhea    • Elevated blood pressure reading without diagnosis of hypertension    • Foot pain     probable tendonitis left foot      • Hip pain    • Hyperglycemia    • Hypertension    • Nausea and vomiting    • Sickle cell disease (CMS/HCC)    • Sleep apnea         Past Surgical History:   Procedure Laterality Date   • DENTAL PROCEDURE                               PT Assessment/Plan     Row Name 18 8374          PT Assessment    Assessment Comments Patient tolerates all interventions well and is now in almost no pain with strenuous activity. Patient presents with continued hypomobility of his hip which will continue to benefit from therex and manual therapy. Hip alignment appears to be good.  -MS        PT Plan    PT Plan Comments continue with therex and manual therapy  -MS       User Key  (r) = Recorded By, (t) = Taken By, (c) = Cosigned By    Initials Name Provider Type    Nilson Jain, PT Physical Therapist                    Exercises     Row Name 18 1600             Subjective Comments    Subjective Comments Patient reports that he feels that he is 78 recovered anfd could be ready for discharge in the near future.  -MS         Subjective Pain    Able to rate subjective pain? yes  -MS      Pre-Treatment Pain Level 0  -MS       "Post-Treatment Pain Level 0  -MS         Exercise 1    Exercise Name 1 Pro II seat 12, level 3  -MS      Time 1 10  -MS      Additional Comments level 5   -MS         Exercise 2    Exercise Name 2 Isometric hip flexion in supine   -MS      Reps 2 10 B  -MS      Time 2 5 \" hold  -MS         Exercise 3    Exercise Name 3 Standing hip flexion  -MS         Exercise 4    Exercise Name 4 PPT  -MS      Reps 4 15  -MS      Time 4 5\" hold  -MS         Exercise 5    Exercise Name 5 Bridge on theraball with with leg exetension  -MS      Sets 5 2  -MS      Reps 5 10  -MS         Exercise 6    Exercise Name 6 SL ABD (no weight)  -MS      Sets 6 2  -MS      Reps 6 10B  -MS         Exercise 7    Exercise Name 7 SLR with 3 lb ankle weights  -MS      Sets 7 2  -MS      Reps 7 15B  -MS         Exercise 8    Exercise Name 8 supine HS stretch with belt  -MS      Reps 8 2  -MS      Time 8 30\" B  -MS         Exercise 9    Exercise Name 9 prone ext (no weight)  -MS      Sets 9 1  -MS      Reps 9 10  -MS         Exercise 10    Exercise Name 10 Cat-Cow  -MS      Reps 10 15  -MS      Time 10 3 \" hold  -MS         Exercise 11    Exercise Name 11 Prone plank  -MS      Reps 11 3  -MS         Exercise 12    Exercise Name 12 Side plank  -MS      Reps 12 3  -MS      Time 12 20\"B  -MS        User Key  (r) = Recorded By, (t) = Taken By, (c) = Cosigned By    Initials Name Provider Type    Nilson Jain, PT Physical Therapist                        Manual Rx (last 36 hours)      Manual Treatments     Row Name 09/20/18 1600             Manual Rx 1    Manual Rx 1 Location Hips  -MS      Manual Rx 1 Type PROM - IR/ER, flex/ext  -MS      Manual Rx 1 Grade 2  -MS      Manual Rx 1 Duration 6 min  -MS         Manual Rx 2    Manual Rx 2 Location ME shotgun  -MS        User Key  (r) = Recorded By, (t) = Taken By, (c) = Cosigned By    Initials Name Provider Type    Nilson Jain, PT Physical Therapist                             Time " Calculation:   Start Time: 1658  Stop Time: 1800  Time Calculation (min): 62 min  Therapy Suggested Charges     Code   Minutes Charges    None           Therapy Charges for Today     Code Description Service Date Service Provider Modifiers Qty    [id number]  PT THER PROC EA 15 MIN 9/20/2018 Nilson Ritchie, PT GP 3    [id number]  PT MANUAL THERAPY EA 15 MIN 9/20/2018 Nilson Ritchie, PT GP 1                    Nilson Ritchie, PT  9/20/2018

## 2018-09-24 ENCOUNTER — HOSPITAL ENCOUNTER (OUTPATIENT)
Dept: MRI IMAGING | Facility: HOSPITAL | Age: 28
Discharge: HOME | End: 2018-09-24
Attending: ORTHOPAEDIC SURGERY | Admitting: ORTHOPAEDIC SURGERY

## 2018-09-24 ENCOUNTER — APPOINTMENT (OUTPATIENT)
Dept: PHYSICAL THERAPY | Facility: HOSPITAL | Age: 28
End: 2018-09-24

## 2018-09-24 DIAGNOSIS — M79.644 PAIN OF RIGHT THUMB: ICD-10-CM

## 2018-09-24 PROCEDURE — 73218 MRI UPPER EXTREMITY W/O DYE: CPT

## 2018-09-25 ENCOUNTER — HOSPITAL ENCOUNTER (OUTPATIENT)
Dept: PHYSICAL THERAPY | Facility: HOSPITAL | Age: 28
Setting detail: THERAPIES SERIES
Discharge: HOME | End: 2018-09-25

## 2018-09-25 DIAGNOSIS — S76.011D STRAIN OF RIGHT HIP ADDUCTOR MUSCLE, SUBSEQUENT ENCOUNTER: ICD-10-CM

## 2018-09-25 DIAGNOSIS — R10.31 RIGHT GROIN PAIN: Primary | ICD-10-CM

## 2018-09-25 DIAGNOSIS — M25.551 PAIN OF RIGHT HIP JOINT: ICD-10-CM

## 2018-09-25 PROCEDURE — 97110 THERAPEUTIC EXERCISES: CPT

## 2018-09-25 NOTE — THERAPY TREATMENT NOTE
Outpatient Physical Therapy Ortho Treatment Note  [facility]     Patient Name: Roosevelt Ochoa  : 1990  MRN: [id number]  Today's Date: 2018      Visit Date: 2018     Subjective Improvement: 90%  Attendance:  8/10 (eval + 8 until 18)  Next MD Visit : PRN  Recert Date:  10/03/18      Therapy Diagnosis:  R Groin Pain         Visit Dx:    ICD-10-CM ICD-9-CM   1. Right groin pain R10.31 789.09   2. Pain of right hip joint M25.551 719.45   3. Strain of right hip adductor muscle, subsequent encounter S76.011D V58.89     843.8       Patient Active Problem List   Diagnosis   • Marijuana use   • BMI 31.0-31.9,adult   • Essential hypertension, benign   • History of retinal tear   • Pain of right thumb        Past Medical History:   Diagnosis Date   • Asthma    • Blood chemistry abnormality    • Diarrhea    • Elevated blood pressure reading without diagnosis of hypertension    • Foot pain     probable tendonitis left foot      • Hip pain    • Hyperglycemia    • Hypertension    • Nausea and vomiting    • Sickle cell disease (CMS/HCC)    • Sleep apnea         Past Surgical History:   Procedure Laterality Date   • DENTAL PROCEDURE               PT Ortho     Row Name 18 3360       Precautions and Contraindications    Precautions/Limitations no known precautions/limitations  -       Posture/Observations    Posture/Observations Comments no acute distress noted this date.   -      User Key  (r) = Recorded By, (t) = Taken By, (c) = Cosigned By    Initials Name Provider Type    Sera Galeas PTA Physical Therapy Assistant                            PT Assessment/Plan     Row Name 18 8917          PT Assessment    Assessment Comments ok'd pt to begin and start back with lower body routine with gym workout. Progressing well towards goals; no increased pain or malalignment  -        PT Plan    PT Frequency 2x/week  -     Predicted Duration of Therapy Intervention (Therapy Eval) 3  "weeks  -KH     PT Plan Comments Continue with one more visit this week then d/c to indpendent management and HEP; visits  18  -       User Key  (r) = Recorded By, (t) = Taken By, (c) = Cosigned By    Initials Name Provider Type    Sera Galeas PTA Physical Therapy Assistant                    Exercises     Row Name 18 1350             Subjective Comments    Subjective Comments Pt reports that his hip had a little bit of muscle soreness on the outside but no pain.  states that he is 90% better overall   -KH         Subjective Pain    Able to rate subjective pain? yes  -KH      Pre-Treatment Pain Level 0  -KH      Post-Treatment Pain Level 0  -KH         Exercise 1    Exercise Name 1 pro ll LE strength   -KH      Time 1 10' level 4.5  -KH         Exercise 2    Exercise Name 2 standing hamstring stretch  -KH      Sets 2 3  -KH      Time 2 30\"  -KH         Exercise 3    Exercise Name 3 standing hip flexors stretch  -KH      Sets 3 3  -KH      Time 3 30\"  -KH         Exercise 4    Exercise Name 4 cat/camel stretch  -KH      Sets 4 1  -KH      Reps 4 10  -KH      Time 4 15\"  -KH         Exercise 5    Exercise Name 5 prone planks  -KH      Sets 5 1  -KH      Reps 5 10  -KH      Time 5 10\" holds  -KH         Exercise 6    Exercise Name 6 lateral planks R/L  -KH      Sets 6 1  -KH      Reps 6 10  -KH      Time 6 10\" holds  -KH         Exercise 7    Exercise Name 7 cybex hip abd  -KH      Sets 7 3  -KH      Reps 7 10  -KH      Additional Comments 50#  -KH         Exercise 8    Exercise Name 8 cybex hip add  -KH      Sets 8 3  -KH      Reps 8 10  -KH      Additional Comments 50#  -KH         Exercise 9    Exercise Name 9 cybex leg press  -KH      Sets 9 3  -KH      Reps 9 10  -KH      Additional Comments 150#  -KH        User Key  (r) = Recorded By, (t) = Taken By, (c) = Cosigned By    Initials Name Provider Type    Sera Galeas PTA Physical Therapy Assistant                               PT OP Goals  "    Row Name 09/25/18 1350          PT Short Term Goals    STG 1 Patient will present with 5/5 hip flexion strength in sitting  -     STG 1 Progress Progressing  -     STG 2 Passive IR of left hip in supine 90/90 position will be pain free.  -     STG 2 Progress Progressing  -        Long Term Goals    LTG Date to Achieve 09/04/18  -     LTG 1 Independent with HEP  -     LTG 1 Progress Met  -     LTG 2 Return to work  -     LTG 2 Progress Met  -     LTG 3 Return to PLOF  -     LTG 3 Progress Met  -     LTG 4 Minimal to no pain with household and work activities  -     LTG 4 Progress Met  -        Time Calculation    PT Goal Re-Cert Due Date 10/03/18  -       User Key  (r) = Recorded By, (t) = Taken By, (c) = Cosigned By    Initials Name Provider Type    Sera Galeas PTA Physical Therapy Assistant                         Time Calculation:   Start Time: 1350  Stop Time: 1430  Time Calculation (min): 40 min  Total Timed Code Minutes- PT: 40 minute(s)  Therapy Suggested Charges     Code   Minutes Charges    None           Therapy Charges for Today     Code Description Service Date Service Provider Modifiers Qty    63700467511 HC PT THER PROC EA 15 MIN 9/25/2018 Sera Mercado PTA GP 3                    Sera Mercado PTA  9/25/2018

## 2018-09-26 ENCOUNTER — HOSPITAL ENCOUNTER (OUTPATIENT)
Dept: SLEEP MEDICINE | Facility: HOSPITAL | Age: 28
Discharge: HOME | End: 2018-09-26
Attending: INTERNAL MEDICINE | Admitting: INTERNAL MEDICINE

## 2018-09-26 VITALS — BODY MASS INDEX: 31.48 KG/M2 | WEIGHT: 224.87 LBS | HEIGHT: 71 IN

## 2018-09-26 DIAGNOSIS — G47.419 NARCOLEPSY WITHOUT CATAPLEXY: ICD-10-CM

## 2018-09-26 PROCEDURE — 95810 POLYSOM 6/> YRS 4/> PARAM: CPT | Performed by: INTERNAL MEDICINE

## 2018-09-26 PROCEDURE — 95810 POLYSOM 6/> YRS 4/> PARAM: CPT

## 2018-09-27 ENCOUNTER — HOSPITAL ENCOUNTER (OUTPATIENT)
Dept: SLEEP MEDICINE | Facility: HOSPITAL | Age: 28
Discharge: HOME | End: 2018-09-27
Attending: INTERNAL MEDICINE | Admitting: INTERNAL MEDICINE

## 2018-09-27 ENCOUNTER — HOSPITAL ENCOUNTER (OUTPATIENT)
Dept: PHYSICAL THERAPY | Facility: HOSPITAL | Age: 28
Setting detail: THERAPIES SERIES
Discharge: HOME | End: 2018-09-27

## 2018-09-27 DIAGNOSIS — S76.011D STRAIN OF RIGHT HIP ADDUCTOR MUSCLE, SUBSEQUENT ENCOUNTER: ICD-10-CM

## 2018-09-27 DIAGNOSIS — R10.31 RIGHT GROIN PAIN: Primary | ICD-10-CM

## 2018-09-27 DIAGNOSIS — G47.419 NARCOLEPSY WITHOUT CATAPLEXY: ICD-10-CM

## 2018-09-27 DIAGNOSIS — M25.551 PAIN OF RIGHT HIP JOINT: ICD-10-CM

## 2018-09-27 LAB
AMPHET+METHAMPHET UR QL: NEGATIVE
BARBITURATES UR QL SCN: NEGATIVE
BENZODIAZ UR QL SCN: NEGATIVE
CANNABINOIDS SERPL QL: NEGATIVE
COCAINE UR QL: NEGATIVE
METHADONE UR QL SCN: NEGATIVE
OPIATES UR QL: NEGATIVE
OXYCODONE UR QL SCN: NEGATIVE

## 2018-09-27 PROCEDURE — 80307 DRUG TEST PRSMV CHEM ANLYZR: CPT | Performed by: INTERNAL MEDICINE

## 2018-09-27 PROCEDURE — 95805 MULTIPLE SLEEP LATENCY TEST: CPT | Performed by: INTERNAL MEDICINE

## 2018-09-27 PROCEDURE — 97110 THERAPEUTIC EXERCISES: CPT

## 2018-09-27 PROCEDURE — 95805 MULTIPLE SLEEP LATENCY TEST: CPT

## 2018-09-27 NOTE — THERAPY DISCHARGE NOTE
Outpatient Physical Therapy Ortho Treatment Note/Discharge Summary  [facility]     Patient Name: Roosevelt Ochoa  : 1990  MRN: [id number]  Today's Date: 2018      Visit Date: 2018  Subjective Improvement 90%  Visits 9/12  Visits approved 8 plus eval from 2018 to 2018  RTMD PRN  Recert Date 10-     Right Groin Pain  Visit Dx:    ICD-10-CM ICD-9-CM   1. Right groin pain R10.31 789.09   2. Pain of right hip joint M25.551 719.45   3. Strain of right hip adductor muscle, subsequent encounter S76.011D V58.89     843.8       Patient Active Problem List   Diagnosis   • Marijuana use   • BMI 31.0-31.9,adult   • Essential hypertension, benign   • History of retinal tear   • Pain of right thumb        Past Medical History:   Diagnosis Date   • Asthma    • Blood chemistry abnormality    • Diarrhea    • Elevated blood pressure reading without diagnosis of hypertension    • Foot pain     probable tendonitis left foot      • Hip pain    • Hyperglycemia    • Hypertension    • Nausea and vomiting    • Sickle cell disease (CMS/HCC)    • Sleep apnea         Past Surgical History:   Procedure Laterality Date   • DENTAL PROCEDURE               Please see Physical Therapy Treatment Note for Subjective and Objective measures this date.                PT Assessment/Plan     Row Name 18 7874          PT Assessment    Assessment Comments Patient cont to have pain with supine 90/90 IR.    -CP        PT Plan    PT Plan Comments D/C to home with HEP  -CP       User Key  (r) = Recorded By, (t) = Taken By, (c) = Cosigned By    Initials Name Provider Type    Patt Sparrow, PTA Physical Therapy Assistant                                       PT OP Goals     Row Name 18 1600          PT Short Term Goals    STG 1 Patient will present with 5/5 hip flexion strength in sitting  -CP     STG 1 Progress Met  -CP     STG 2 Passive IR of left hip in supine 90/90 position will be pain free.   -CP     STG 2 Progress Not Met  -CP        Long Term Goals    LTG Date to Achieve 09/04/18  -CP     LTG 1 Independent with HEP  -CP     LTG 1 Progress Met  -CP     LTG 2 Return to work  -CP     LTG 2 Progress Met  -CP     LTG 3 Return to PLOF  -CP     LTG 3 Progress Met  -CP     LTG 4 Minimal to no pain with household and work activities  -CP     LTG 4 Progress Met  -CP        Time Calculation    PT Goal Re-Cert Due Date 10/03/18  -CP       User Key  (r) = Recorded By, (t) = Taken By, (c) = Cosigned By    Initials Name Provider Type    Patt Sparrow, PTA Physical Therapy Assistant          Therapy Education  Education Details: self correction in SI rotation  Given: HEP  Program: Reinforced  How Provided: Verbal, Demonstration  Provided to: Patient  Level of Understanding: Verbalized, Demonstrated    Outcome Measure Options: Lower Extremity Functional Scale (LEFS)  Lower Extremity Functional Index  Any of your usual work, housework or school activities: No difficulty  Your usual hobbies, recreational or sporting activities: No difficulty  Getting into or out of the bath: No difficulty  Walking between rooms: No difficulty  Putting on your shoes or socks: No difficulty  Squatting: No difficulty  Lifting an object, like a bag of groceries from the floor: No difficulty  Performing light activities around your home: No difficulty  Performing heavy activities around your home: No difficulty  Getting into or out of a car: No difficulty  Walking 2 blocks: No difficulty  Walking a mile: No difficulty  Going up or down 10 stairs (about 1 flight of stairs): No difficulty  Standing for 1 hour: A little bit of difficulty  Sitting for 1 hour: No difficulty  Running on even ground: Extreme difficulty or unable to perform activity  Running on uneven ground: A little bit of difficulty  Making sharp turns while running fast: A little bit of difficulty  Hopping: No difficulty  Rolling over in bed: No difficulty  Total:  73      Time Calculation:   Start Time: 1613  Stop Time: 1655  Time Calculation (min): 42 min  Total Timed Code Minutes- PT: 42 minute(s)             OP PT Discharge Summary  Date of Discharge: 09/27/18  Reason for Discharge: Maximum functional potential achieved  Discharge Destination: Home with home program      Patt Jenkins, PTA  9/27/2018

## 2018-09-27 NOTE — THERAPY TREATMENT NOTE
Outpatient Physical Therapy Ortho Treatment Note  [facility]     Patient Name: Roosevelt Ochoa  : 1990  MRN: [id number]  Today's Date: 2018      Visit Date: 2018     Subjective Improvement 90%  Visits   Visits approved 8 plus eval from 2018 to 2018  RTMD PRN  Recert Date 10-    Right Groin Pain    Visit Dx:    ICD-10-CM ICD-9-CM   1. Right groin pain R10.31 789.09   2. Pain of right hip joint M25.551 719.45   3. Strain of right hip adductor muscle, subsequent encounter S76.011D V58.89     843.8       Patient Active Problem List   Diagnosis   • Marijuana use   • BMI 31.0-31.9,adult   • Essential hypertension, benign   • History of retinal tear   • Pain of right thumb        Past Medical History:   Diagnosis Date   • Asthma    • Blood chemistry abnormality    • Diarrhea    • Elevated blood pressure reading without diagnosis of hypertension    • Foot pain     probable tendonitis left foot      • Hip pain    • Hyperglycemia    • Hypertension    • Nausea and vomiting    • Sickle cell disease (CMS/HCC)    • Sleep apnea         Past Surgical History:   Procedure Laterality Date   • DENTAL PROCEDURE               PT Ortho     Row Name 18 1350       Precautions and Contraindications    Precautions/Limitations no known precautions/limitations  -       Posture/Observations    Posture/Observations Comments no acute distress noted this date.   -      User Key  (r) = Recorded By, (t) = Taken By, (c) = Cosigned By    Initials Name Provider Type    Sera Galeas PTA Physical Therapy Assistant                            PT Assessment/Plan     Row Name 18 9829          PT Assessment    Assessment Comments Patient cont to have pain with supine 90/90 IR.    -CP        PT Plan    PT Plan Comments D/C to home with HEP  -CP       User Key  (r) = Recorded By, (t) = Taken By, (c) = Cosigned By    Initials Name Provider Type    Patt Sparrow PTA Physical Therapy  "Assistant                    Exercises     Row Name 09/27/18 1600             Subjective Comments    Subjective Comments Patient states that he is doing well and reports no  pain.  Patient hasnt been to the gym yet.  Hopefully, this weekend  -CP         Subjective Pain    Able to rate subjective pain? yes  -CP      Pre-Treatment Pain Level 0  -CP         Exercise 1    Exercise Name 1 patient 10 minutes late  -CP         Exercise 2    Exercise Name 2 Pro II level 5  -CP      Time 2 10  -CP         Exercise 3    Exercise Name 3 Standing hip fl stretch  -CP      Sets 3 3  -CP      Time 3 30  -CP         Exercise 4    Exercise Name 4 prone  planks  -CP      Sets 4 1  -CP      Reps 4 10  -CP      Time 4 15\" hold  -CP         Exercise 5    Exercise Name 5 lat planks  -CP      Sets 5 1  -CP      Reps 5 10  -CP      Time 5 10  -CP         Exercise 6    Exercise Name 6 cybex leg press   -CP      Sets 6 3  -CP      Reps 6 10  -CP      Time 6 170 lb  -CP         Exercise 7    Exercise Name 7 cybex hip AB  -CP      Sets 7 3  -CP      Reps 7 10  -CP      Time 7 70 lb  -CP         Exercise 8    Exercise Name 8 cybex hip AD  -CP      Sets 8 3  -CP      Reps 8 10  -CP      Time 8 50 lb  -CP         Exercise 9    Exercise Name 9 self instruction in SI correction  -CP        User Key  (r) = Recorded By, (t) = Taken By, (c) = Cosigned By    Initials Name Provider Type    CP Patt Jenkins, PTA Physical Therapy Assistant                               PT OP Goals     Row Name 09/27/18 1600          PT Short Term Goals    STG 1 Patient will present with 5/5 hip flexion strength in sitting  -CP     STG 1 Progress Met  -CP     STG 2 Passive IR of left hip in supine 90/90 position will be pain free.  -CP     STG 2 Progress Not Met  -CP        Long Term Goals    LTG Date to Achieve 09/04/18  -CP     LTG 1 Independent with HEP  -CP     LTG 1 Progress Met  -CP     LTG 2 Return to work  -CP     LTG 2 Progress Met  -CP     LTG 3 Return to " PLOF  -CP     LTG 3 Progress Met  -CP     LTG 4 Minimal to no pain with household and work activities  -CP     LTG 4 Progress Met  -CP        Time Calculation    PT Goal Re-Cert Due Date 10/03/18  -CP       User Key  (r) = Recorded By, (t) = Taken By, (c) = Cosigned By    Initials Name Provider Type    Patt Sparrow, PTA Physical Therapy Assistant          Therapy Education  Education Details: self correction in SI rotation  Given: HEP  Program: Reinforced  How Provided: Verbal, Demonstration  Provided to: Patient  Level of Understanding: Verbalized, Demonstrated    Outcome Measure Options: Lower Extremity Functional Scale (LEFS)  Lower Extremity Functional Index  Any of your usual work, housework or school activities: No difficulty  Your usual hobbies, recreational or sporting activities: No difficulty  Getting into or out of the bath: No difficulty  Walking between rooms: No difficulty  Putting on your shoes or socks: No difficulty  Squatting: No difficulty  Lifting an object, like a bag of groceries from the floor: No difficulty  Performing light activities around your home: No difficulty  Performing heavy activities around your home: No difficulty  Getting into or out of a car: No difficulty  Walking 2 blocks: No difficulty  Walking a mile: No difficulty  Going up or down 10 stairs (about 1 flight of stairs): No difficulty  Standing for 1 hour: A little bit of difficulty  Sitting for 1 hour: No difficulty  Running on even ground: Extreme difficulty or unable to perform activity  Running on uneven ground: A little bit of difficulty  Making sharp turns while running fast: A little bit of difficulty  Hopping: No difficulty  Rolling over in bed: No difficulty  Total: 73      Time Calculation:   Start Time: 1613  Stop Time: 1655  Time Calculation (min): 42 min  Total Timed Code Minutes- PT: 42 minute(s)  Therapy Suggested Charges     Code   Minutes Charges    None           Therapy Charges for Today     Code  Description Service Date Service Provider Modifiers Qty    08024331162 HC PT THER PROC EA 15 MIN 9/27/2018 Patt Jenkins, PTA GP 3          PT G-Codes  Outcome Measure Options: Lower Extremity Functional Scale (LEFS)  Total: 73         Patt Jenkins PTA  9/27/2018

## 2018-09-28 DIAGNOSIS — T14.8XXA LIGAMENT TEAR: ICD-10-CM

## 2018-09-28 DIAGNOSIS — M79.644 PAIN OF RIGHT THUMB: Primary | ICD-10-CM

## 2018-10-01 RX ORDER — SODIUM OXYBATE 0.5 G/ML
2.25 SOLUTION ORAL 2 TIMES DAILY
Qty: 1 BOTTLE | Refills: 3 | Status: SHIPPED | OUTPATIENT
Start: 2018-10-01 | End: 2019-03-12

## 2018-10-01 RX ORDER — MODAFINIL 100 MG/1
100 TABLET ORAL DAILY
Qty: 30 TABLET | Refills: 3 | Status: SHIPPED | OUTPATIENT
Start: 2018-10-01 | End: 2018-10-16 | Stop reason: ALTCHOICE

## 2018-10-09 ENCOUNTER — OFFICE VISIT (OUTPATIENT)
Dept: FAMILY MEDICINE CLINIC | Facility: CLINIC | Age: 28
End: 2018-10-09

## 2018-10-09 VITALS
DIASTOLIC BLOOD PRESSURE: 84 MMHG | SYSTOLIC BLOOD PRESSURE: 118 MMHG | HEIGHT: 71 IN | BODY MASS INDEX: 30.72 KG/M2 | WEIGHT: 219.4 LBS

## 2018-10-09 DIAGNOSIS — G47.429 NARCOLEPSY DUE TO UNDERLYING CONDITION WITHOUT CATAPLEXY: Primary | ICD-10-CM

## 2018-10-09 PROBLEM — T14.8XXA LIGAMENT TEAR: Chronic | Status: ACTIVE | Noted: 2018-09-28

## 2018-10-09 PROBLEM — M79.644 PAIN OF RIGHT THUMB: Status: RESOLVED | Noted: 2018-09-20 | Resolved: 2018-10-09

## 2018-10-09 PROCEDURE — 99212 OFFICE O/P EST SF 10 MIN: CPT | Performed by: FAMILY MEDICINE

## 2018-10-09 NOTE — PROGRESS NOTES
Subjective   Roosevelt Ochoa is a 28 y.o. male.     History of Present Illness  follow-up sleep study showed narcolepsy.  Patient having trouble with medication procurement we have referred him back to up his sleep  since this is a schedule drug we've counseled on deferring for all sleep evaluations to sleep clinic.  Thumb is improving but is gone after surgery    The following portions of the patient's history were reviewed and updated as appropriate: allergies, current medications, past family history, past medical history, past social history, past surgical history and problem list.    Review of Systems   Psychiatric/Behavioral: Positive for sleep disturbance.       Objective   Physical Exam   Constitutional: He appears well-developed.   HENT:   Head: Normocephalic.   Eyes: Pupils are equal, round, and reactive to light.   Psychiatric: He has a normal mood and affect. His behavior is normal.       Assessment/Plan   Roosevelt was seen today for follow-up.    Diagnoses and all orders for this visit:    Narcolepsy due to underlying condition without cataplexy

## 2018-10-16 RX ORDER — DEXTROAMPHETAMINE SACCHARATE, AMPHETAMINE ASPARTATE MONOHYDRATE, DEXTROAMPHETAMINE SULFATE AND AMPHETAMINE SULFATE 2.5; 2.5; 2.5; 2.5 MG/1; MG/1; MG/1; MG/1
10 CAPSULE, EXTENDED RELEASE ORAL EVERY MORNING
Qty: 30 CAPSULE | Refills: 0 | Status: SHIPPED | OUTPATIENT
Start: 2018-10-16 | End: 2018-11-29 | Stop reason: SDUPTHER

## 2018-10-22 ENCOUNTER — OFFICE VISIT (OUTPATIENT)
Dept: SLEEP MEDICINE | Facility: HOSPITAL | Age: 28
End: 2018-10-22

## 2018-10-22 VITALS
SYSTOLIC BLOOD PRESSURE: 147 MMHG | OXYGEN SATURATION: 99 % | HEIGHT: 71 IN | HEART RATE: 72 BPM | WEIGHT: 222.2 LBS | BODY MASS INDEX: 31.11 KG/M2 | DIASTOLIC BLOOD PRESSURE: 114 MMHG

## 2018-10-22 DIAGNOSIS — G47.411 NARCOLEPSY WITH CATAPLEXY: Primary | ICD-10-CM

## 2018-10-22 PROCEDURE — 99214 OFFICE O/P EST MOD 30 MIN: CPT | Performed by: INTERNAL MEDICINE

## 2018-10-22 RX ORDER — DEXTROAMPHETAMINE SACCHARATE, AMPHETAMINE ASPARTATE, DEXTROAMPHETAMINE SULFATE AND AMPHETAMINE SULFATE 1.25; 1.25; 1.25; 1.25 MG/1; MG/1; MG/1; MG/1
5 TABLET ORAL 2 TIMES DAILY
Qty: 60 TABLET | Refills: 0 | Status: SHIPPED | OUTPATIENT
Start: 2018-10-22 | End: 2018-11-01 | Stop reason: SDUPTHER

## 2018-10-22 NOTE — PROGRESS NOTES
CHIEF COMPLAINT: follow up diagnostic polysomnography and MSLT    HPI:    The patient is a 28 y.o. male.  He returns to sleep clinic to discuss the results of the recent diagnostic polysomnography and MSLT performed on 09/26/2018 and 09/27/2018.  The AHI/LUPILLO was 1.4, onset to REM sleep was 1 minute. On the MSLT, he had SOREMP in the first 2 naps with a latency average of 3.25 minutes.      INTERVAL MEDICAL HISTORY: he was started on amphetamine-dextroamphetamine 10mg XR daily but continues with sx. He has napping throughout the day, He is here with his significant other who states he is ~ 10-20% better. He has some adverse effects - more headaches (daily).    Bed time: 2100  Sleep latency: 5-20 minutes  Number of times awakens during the night: 1  Wake time: 7369-6935  Estimated total sleep time at night: 6-8 hours  Caffeine intake: 0oz of coffee, 0oz of tea, and 0oz of soda  Alcohol intake: 0 drinks per week  Nap time: 2-3 times per day  Sleepiness with Driving: yes      MEDICATIONS:   Current Outpatient Prescriptions:   •  amLODIPine (NORVASC) 10 MG tablet, Take 1 tablet by mouth Daily. For bp  New dose   Finish previous, Disp: 90 tablet, Rfl: 3  •  amphetamine-dextroamphetamine XR (ADDERALL XR) 10 MG 24 hr capsule, Take 1 capsule by mouth Every Morning, Disp: 30 capsule, Rfl: 0  •  chlorthalidone (HYGROTON) 25 MG tablet, Take 25 mg by mouth Daily., Disp: , Rfl:   •  meloxicam (MOBIC) 7.5 MG tablet, 1 bid x 5d then prn elbow, Disp: 30 tablet, Rfl: 1  •  pantoprazole (PROTONIX) 40 MG EC tablet, Take 40 mg by mouth Daily., Disp: , Rfl:   •  Sodium Oxybate (XYREM) 500 MG/ML solution, Take 2.25 g by mouth 2 (Two) Times a Day for 30 days. Take before bed and 4 hours later, Disp: 1 bottle, Rfl: 3    PHYSICAL EXAM  Vital Signs (last 24 hours)       10/21 0700  -  10/22 0659 10/22 0700  -  10/22 1431   Most Recent    Heart Rate     72     72    BP     (!) 147/114     (!) 147/114    SpO2 (%)     99     99          Gen:  No  acute distress, alert, oriented  Lungs:  CTA with normal effort   CV:  RRR, no M/R/G  GI:  soft, non-tender  Ext:  no c/c/e    Sleep Testin. Diagnostic polysomnography and MSLT performed on 2018 and 2018   The AHI/LUPILLO was 1.4, onset to REM sleep was 1 minute. On the MSLT, he had SOREMP in the first 2 naps with a latency average of 3.25 minutes.    ASSESSMENT / PLAN:     1. Narcolepsy type I with cataplexy  1. On adderall 10 xR  2. suboptimal control with some adverse effects  3. Add short acting amphetamine TID (5mg)   4. Script for Xyrem  5. Return to clinic in 3 months  6. UDS in 3 months  7. Letter for employer/school    All of the patient's questions were answered. He states understanding and agreement with my assessment and plan as above.  Total time 25 min, more than half spent in face to face counseling and coordination of care.    RTC in 3 months    He agrees to return sooner on a prn basis.             This document has been electronically signed by Bhargav Etienne MD on 2018           CC: Jhonathan Nielsen MD          No ref. provider found

## 2018-11-01 RX ORDER — DEXTROAMPHETAMINE SACCHARATE, AMPHETAMINE ASPARTATE, DEXTROAMPHETAMINE SULFATE AND AMPHETAMINE SULFATE 1.25; 1.25; 1.25; 1.25 MG/1; MG/1; MG/1; MG/1
5 TABLET ORAL 2 TIMES DAILY
Qty: 60 TABLET | Refills: 0 | Status: SHIPPED | OUTPATIENT
Start: 2018-11-01 | End: 2018-11-29 | Stop reason: SDUPTHER

## 2018-11-29 RX ORDER — DEXTROAMPHETAMINE SACCHARATE, AMPHETAMINE ASPARTATE, DEXTROAMPHETAMINE SULFATE AND AMPHETAMINE SULFATE 1.25; 1.25; 1.25; 1.25 MG/1; MG/1; MG/1; MG/1
5 TABLET ORAL 2 TIMES DAILY
Qty: 60 TABLET | Refills: 0 | Status: SHIPPED | OUTPATIENT
Start: 2018-11-29 | End: 2019-01-04 | Stop reason: SDUPTHER

## 2018-11-29 RX ORDER — DEXTROAMPHETAMINE SACCHARATE, AMPHETAMINE ASPARTATE MONOHYDRATE, DEXTROAMPHETAMINE SULFATE AND AMPHETAMINE SULFATE 2.5; 2.5; 2.5; 2.5 MG/1; MG/1; MG/1; MG/1
10 CAPSULE, EXTENDED RELEASE ORAL EVERY MORNING
Qty: 30 CAPSULE | Refills: 0 | Status: SHIPPED | OUTPATIENT
Start: 2018-11-29 | End: 2019-01-04 | Stop reason: SDUPTHER

## 2019-01-04 RX ORDER — DEXTROAMPHETAMINE SACCHARATE, AMPHETAMINE ASPARTATE, DEXTROAMPHETAMINE SULFATE AND AMPHETAMINE SULFATE 1.25; 1.25; 1.25; 1.25 MG/1; MG/1; MG/1; MG/1
5 TABLET ORAL 2 TIMES DAILY
Qty: 60 TABLET | Refills: 0 | Status: SHIPPED | OUTPATIENT
Start: 2019-01-04 | End: 2019-02-19 | Stop reason: SDUPTHER

## 2019-01-04 RX ORDER — DEXTROAMPHETAMINE SACCHARATE, AMPHETAMINE ASPARTATE MONOHYDRATE, DEXTROAMPHETAMINE SULFATE AND AMPHETAMINE SULFATE 2.5; 2.5; 2.5; 2.5 MG/1; MG/1; MG/1; MG/1
10 CAPSULE, EXTENDED RELEASE ORAL EVERY MORNING
Qty: 30 CAPSULE | Refills: 0 | Status: SHIPPED | OUTPATIENT
Start: 2019-01-04 | End: 2019-02-19 | Stop reason: SDUPTHER

## 2019-02-19 RX ORDER — DEXTROAMPHETAMINE SACCHARATE, AMPHETAMINE ASPARTATE MONOHYDRATE, DEXTROAMPHETAMINE SULFATE AND AMPHETAMINE SULFATE 2.5; 2.5; 2.5; 2.5 MG/1; MG/1; MG/1; MG/1
10 CAPSULE, EXTENDED RELEASE ORAL EVERY MORNING
Qty: 30 CAPSULE | Refills: 0 | Status: SHIPPED | OUTPATIENT
Start: 2019-02-19 | End: 2019-03-11 | Stop reason: SDUPTHER

## 2019-02-19 RX ORDER — DEXTROAMPHETAMINE SACCHARATE, AMPHETAMINE ASPARTATE, DEXTROAMPHETAMINE SULFATE AND AMPHETAMINE SULFATE 1.25; 1.25; 1.25; 1.25 MG/1; MG/1; MG/1; MG/1
5 TABLET ORAL 2 TIMES DAILY
Qty: 60 TABLET | Refills: 0 | Status: SHIPPED | OUTPATIENT
Start: 2019-02-19 | End: 2019-03-11 | Stop reason: SDUPTHER

## 2019-02-28 ENCOUNTER — OFFICE VISIT (OUTPATIENT)
Dept: FAMILY MEDICINE CLINIC | Facility: CLINIC | Age: 29
End: 2019-02-28

## 2019-02-28 VITALS
BODY MASS INDEX: 31.16 KG/M2 | SYSTOLIC BLOOD PRESSURE: 142 MMHG | HEIGHT: 71 IN | DIASTOLIC BLOOD PRESSURE: 88 MMHG | WEIGHT: 222.6 LBS

## 2019-02-28 DIAGNOSIS — I10 ESSENTIAL HYPERTENSION, BENIGN: Primary | Chronic | ICD-10-CM

## 2019-02-28 DIAGNOSIS — S86.919A STRAIN OF KNEE, UNSPECIFIED LATERALITY, INITIAL ENCOUNTER: ICD-10-CM

## 2019-02-28 DIAGNOSIS — M25.561 ACUTE PAIN OF RIGHT KNEE: ICD-10-CM

## 2019-02-28 PROCEDURE — 99214 OFFICE O/P EST MOD 30 MIN: CPT | Performed by: FAMILY MEDICINE

## 2019-02-28 NOTE — PROGRESS NOTES
Subjective   Roosevelt Ochoa is a 28 y.o. male.     History of Present Illness   reevaluation hypertension.  Also states of injured knee playing basketball about 2 weeks ago came down hard on the right extended knee pain laterally when walking climbing since.  Minimal swelling.  Is currently on Adderall for narcolepsy but not the other medication.  Blood pressures also been creeping upward.  Therefore she is also not been exercising as much and gained weight.    The following portions of the patient's history were reviewed and updated as appropriate: allergies, current medications, past family history, past medical history, past social history, past surgical history and problem list.    Review of Systems   Constitutional: Negative for activity change, appetite change, fatigue and unexpected weight change.   HENT: Negative for trouble swallowing and voice change.    Eyes: Negative for redness and visual disturbance.   Respiratory: Negative for cough and wheezing.    Cardiovascular: Negative for chest pain and palpitations.   Gastrointestinal: Negative for abdominal pain, constipation, diarrhea, nausea and vomiting.   Genitourinary: Negative for urgency.   Musculoskeletal: Positive for arthralgias, gait problem and joint swelling.   Neurological: Negative for syncope and headaches.   Hematological: Negative for adenopathy.   Psychiatric/Behavioral: Negative for sleep disturbance.       Objective   Physical Exam   Constitutional: He is oriented to person, place, and time. He appears well-developed.   HENT:   Head: Normocephalic.   Right Ear: External ear normal.   Nose: Nose normal.   Mouth/Throat: Oropharynx is clear and moist.   Eyes: Pupils are equal, round, and reactive to light.   Neck: Normal range of motion. No thyromegaly present.   Cardiovascular: Normal rate, regular rhythm, normal heart sounds and intact distal pulses. Exam reveals no gallop and no friction rub.   No murmur heard.  Pulmonary/Chest:  Breath sounds normal.   Abdominal: Soft. He exhibits no distension and no mass. There is no tenderness.   Musculoskeletal: Normal range of motion.   Right knee shows minimal tenderness laterally superiorly.  Pain inversion eversion lateral collateral ligament area.  Minimal swelling.  Negative drawer pull.  Gait normal.   Neurological: He is alert and oriented to person, place, and time. He has normal reflexes.   Skin: Skin is warm and dry.   Psychiatric: He has a normal mood and affect.       Assessment/Plan   Roosevelt was seen today for hypertension.    Diagnoses and all orders for this visit:    Essential hypertension, benign    Acute pain of right knee    Strain of knee, unspecified laterality, initial encounter  -     Ambulatory Referral to Physical Therapy Evaluate and treat        We will hold off increasing or adding blood pressure medicine at present however recheck in 3 months instead of 6 her pressures not come down with lifestyle changes will have to add to current regiment.  Trial of physical therapy for the knee.  Recheck 3 months

## 2019-03-11 RX ORDER — DEXTROAMPHETAMINE SACCHARATE, AMPHETAMINE ASPARTATE MONOHYDRATE, DEXTROAMPHETAMINE SULFATE AND AMPHETAMINE SULFATE 2.5; 2.5; 2.5; 2.5 MG/1; MG/1; MG/1; MG/1
10 CAPSULE, EXTENDED RELEASE ORAL EVERY MORNING
Qty: 30 CAPSULE | Refills: 0 | Status: SHIPPED | OUTPATIENT
Start: 2019-03-11 | End: 2019-03-12 | Stop reason: ALTCHOICE

## 2019-03-11 RX ORDER — DEXTROAMPHETAMINE SACCHARATE, AMPHETAMINE ASPARTATE, DEXTROAMPHETAMINE SULFATE AND AMPHETAMINE SULFATE 1.25; 1.25; 1.25; 1.25 MG/1; MG/1; MG/1; MG/1
5 TABLET ORAL 2 TIMES DAILY
Qty: 60 TABLET | Refills: 0 | Status: SHIPPED | OUTPATIENT
Start: 2019-03-11 | End: 2019-04-15 | Stop reason: SDUPTHER

## 2019-03-12 ENCOUNTER — OFFICE VISIT (OUTPATIENT)
Dept: SLEEP MEDICINE | Facility: HOSPITAL | Age: 29
End: 2019-03-12

## 2019-03-12 VITALS
OXYGEN SATURATION: 98 % | HEIGHT: 71 IN | SYSTOLIC BLOOD PRESSURE: 168 MMHG | HEART RATE: 88 BPM | WEIGHT: 224.1 LBS | DIASTOLIC BLOOD PRESSURE: 101 MMHG | BODY MASS INDEX: 31.37 KG/M2

## 2019-03-12 DIAGNOSIS — G47.411 NARCOLEPSY WITH CATAPLEXY: Primary | ICD-10-CM

## 2019-03-12 PROCEDURE — 99214 OFFICE O/P EST MOD 30 MIN: CPT | Performed by: INTERNAL MEDICINE

## 2019-03-12 RX ORDER — DEXTROAMPHETAMINE SACCHARATE, AMPHETAMINE ASPARTATE MONOHYDRATE, DEXTROAMPHETAMINE SULFATE AND AMPHETAMINE SULFATE 5; 5; 5; 5 MG/1; MG/1; MG/1; MG/1
20 CAPSULE, EXTENDED RELEASE ORAL EVERY MORNING
Qty: 30 CAPSULE | Refills: 0 | Status: SHIPPED | OUTPATIENT
Start: 2019-03-12 | End: 2019-04-15 | Stop reason: SDUPTHER

## 2019-03-12 NOTE — PROGRESS NOTES
Sleep Clinic Follow Up    Date: 3/12/2019  Primary Care Physician: Jhonathan Nielsen MD    Last office visit: 10/22/2018 (I reviewed this note)    CC: Follow up: narcolepsy with cataplexy    Sleep Testin. PSG on 2018, AHI of 1.4   2. MSLT on 2018, he had SOREMP in the first 2 naps with a latency average of 3.25 minutes.    Assessment and Plan:    1. Narcolepsy type I with cataplexy Established, not controlled (2)  1. On Adderall 10mg XR po in the am and (2) 5mg pills ~ 0900. He is good until ~ 12 noon  2. Will increase to 20mg XR daily and 10 mg SA pills TID prn  3. Resume workup with Xyrem - has increased BP - will need to monitor  2. HTN  1. Hereditary - elevated slightly, may need to reduce amphetmaine, or add SSRI  2. Would try Xyrem, aware of sodium load    Interim History:  Since the last visit:    1) moderate narcolepsy  - Roosevelt Ochoa has remained compliant with medication, but symptoms re main suboptimally controlled.    Bed time: 3293-2323, occasionally 7206-1177  Sleep latency: <2 minutes  Number of times awakens during the night: 1-2  Wake time: 0615  Estimated total sleep time at night: 4-7 hours during the week, 10 hours on off days  Caffeine intake: 2oz of coffee, 6oz of tea, and 0oz of soda  Alcohol intake: 0 drinks per week  Nap time: daily   Sleepiness with Driving: some    [place] - 12    2) Patient denies RLS symptoms.     PMHx, FH, SH reviewed and pertinent changes are: unchanged from last office visit on 10/22/2018      REVIEW OF SYSTEMS:   Negative for chest pain, fever, cough, SOA, abdominal pain. Smoking:none    Exam:  Vitals:    19 1402   BP: (!) 168/101   Pulse: 88   SpO2: 98%           19  1402   Weight: 102 kg (224 lb 1.6 oz)     Body mass index is 31.27 kg/m². Patient's Body mass index is 31.27 kg/m². BMI is above normal parameters. Recommendations include: referral to primary care.    Gen:  No acute distress, alert, oriented  Lungs:  CTA with normal  effort   CV:  RRR, no M/R/G  GI:  soft, non-tender  Psych:  Appropriate affect    Past Medical History:   Diagnosis Date   • Asthma    • Blood chemistry abnormality    • Diarrhea    • Elevated blood pressure reading without diagnosis of hypertension    • Foot pain     probable tendonitis left foot      • Hip pain    • Hyperglycemia    • Hypertension    • Nausea and vomiting    • Sickle cell disease (CMS/HCC)    • Sleep apnea        Current Outpatient Medications:   •  amLODIPine (NORVASC) 10 MG tablet, Take 1 tablet by mouth Daily. For bp  New dose   Finish previous, Disp: 90 tablet, Rfl: 3  •  amphetamine-dextroamphetamine (ADDERALL) 5 MG tablet, Take 1 tablet by mouth 2 (Two) Times a Day., Disp: 60 tablet, Rfl: 0  •  amphetamine-dextroamphetamine XR (ADDERALL XR) 10 MG 24 hr capsule, Take 1 capsule by mouth Every Morning, Disp: 30 capsule, Rfl: 0  •  chlorthalidone (HYGROTON) 25 MG tablet, Take 25 mg by mouth Daily., Disp: , Rfl:   •  meloxicam (MOBIC) 7.5 MG tablet, 1 bid x 5d then prn elbow (Patient taking differently: Take 7.5 mg by mouth As Needed. 1 bid x 5d then prn elbow), Disp: 30 tablet, Rfl: 1  •  pantoprazole (PROTONIX) 40 MG EC tablet, Take 40 mg by mouth Daily., Disp: , Rfl:     Total time 15 min, more than half spent in face to face counseling and coordination of care.    RTC in 6 months     This document has been electronically signed by Bhargav Etienne MD on March 12, 2019         CC: Jhonathan Nielsen MD          No ref. provider found

## 2019-04-15 RX ORDER — DEXTROAMPHETAMINE SACCHARATE, AMPHETAMINE ASPARTATE, DEXTROAMPHETAMINE SULFATE AND AMPHETAMINE SULFATE 1.25; 1.25; 1.25; 1.25 MG/1; MG/1; MG/1; MG/1
5 TABLET ORAL 2 TIMES DAILY
Qty: 60 TABLET | Refills: 0 | Status: SHIPPED | OUTPATIENT
Start: 2019-04-15 | End: 2019-05-16 | Stop reason: SDUPTHER

## 2019-04-15 RX ORDER — DEXTROAMPHETAMINE SACCHARATE, AMPHETAMINE ASPARTATE MONOHYDRATE, DEXTROAMPHETAMINE SULFATE AND AMPHETAMINE SULFATE 5; 5; 5; 5 MG/1; MG/1; MG/1; MG/1
20 CAPSULE, EXTENDED RELEASE ORAL EVERY MORNING
Qty: 30 CAPSULE | Refills: 0 | Status: SHIPPED | OUTPATIENT
Start: 2019-04-15 | End: 2019-05-16

## 2019-05-16 RX ORDER — DEXTROAMPHETAMINE SACCHARATE, AMPHETAMINE ASPARTATE MONOHYDRATE, DEXTROAMPHETAMINE SULFATE AND AMPHETAMINE SULFATE 5; 5; 5; 5 MG/1; MG/1; MG/1; MG/1
20 CAPSULE, EXTENDED RELEASE ORAL 2 TIMES DAILY
Qty: 60 CAPSULE | Refills: 0 | Status: SHIPPED | OUTPATIENT
Start: 2019-05-16 | End: 2019-06-19 | Stop reason: SDUPTHER

## 2019-05-16 RX ORDER — DEXTROAMPHETAMINE SACCHARATE, AMPHETAMINE ASPARTATE, DEXTROAMPHETAMINE SULFATE AND AMPHETAMINE SULFATE 1.25; 1.25; 1.25; 1.25 MG/1; MG/1; MG/1; MG/1
5 TABLET ORAL 2 TIMES DAILY
Qty: 60 TABLET | Refills: 0 | Status: SHIPPED | OUTPATIENT
Start: 2019-05-16 | End: 2019-06-19

## 2019-05-21 RX ORDER — DEXTROAMPHETAMINE SACCHARATE, AMPHETAMINE ASPARTATE MONOHYDRATE, DEXTROAMPHETAMINE SULFATE AND AMPHETAMINE SULFATE 5; 5; 5; 5 MG/1; MG/1; MG/1; MG/1
20 CAPSULE, EXTENDED RELEASE ORAL 2 TIMES DAILY
Qty: 60 CAPSULE | Refills: 0 | OUTPATIENT
Start: 2019-05-21

## 2019-05-29 ENCOUNTER — OFFICE VISIT (OUTPATIENT)
Dept: FAMILY MEDICINE CLINIC | Facility: CLINIC | Age: 29
End: 2019-05-29

## 2019-05-29 ENCOUNTER — APPOINTMENT (OUTPATIENT)
Dept: LAB | Facility: HOSPITAL | Age: 29
End: 2019-05-29

## 2019-05-29 VITALS
HEIGHT: 70 IN | WEIGHT: 232.6 LBS | DIASTOLIC BLOOD PRESSURE: 80 MMHG | SYSTOLIC BLOOD PRESSURE: 132 MMHG | BODY MASS INDEX: 33.3 KG/M2

## 2019-05-29 DIAGNOSIS — K21.9 GASTROESOPHAGEAL REFLUX DISEASE WITHOUT ESOPHAGITIS: ICD-10-CM

## 2019-05-29 DIAGNOSIS — I10 ESSENTIAL HYPERTENSION, BENIGN: Primary | Chronic | ICD-10-CM

## 2019-05-29 PROBLEM — G47.429 NARCOLEPSY DUE TO UNDERLYING CONDITION WITHOUT CATAPLEXY: Status: ACTIVE | Noted: 2019-05-29

## 2019-05-29 PROCEDURE — 83735 ASSAY OF MAGNESIUM: CPT | Performed by: FAMILY MEDICINE

## 2019-05-29 PROCEDURE — 99214 OFFICE O/P EST MOD 30 MIN: CPT | Performed by: FAMILY MEDICINE

## 2019-05-29 PROCEDURE — 36415 COLL VENOUS BLD VENIPUNCTURE: CPT | Performed by: FAMILY MEDICINE

## 2019-05-29 PROCEDURE — 80053 COMPREHEN METABOLIC PANEL: CPT | Performed by: FAMILY MEDICINE

## 2019-05-29 RX ORDER — AMLODIPINE BESYLATE 5 MG/1
10 TABLET ORAL
COMMUNITY
End: 2019-05-29

## 2019-05-29 RX ORDER — PANTOPRAZOLE SODIUM 40 MG/1
40 TABLET, DELAYED RELEASE ORAL DAILY
Qty: 30 TABLET | Refills: 2 | Status: SHIPPED | OUTPATIENT
Start: 2019-05-29 | End: 2020-07-07

## 2019-05-29 RX ORDER — CHLORTHALIDONE 25 MG/1
25 TABLET ORAL DAILY
Qty: 90 TABLET | Refills: 3 | Status: SHIPPED | OUTPATIENT
Start: 2019-05-29 | End: 2020-06-10 | Stop reason: SDUPTHER

## 2019-05-29 RX ORDER — CHLORTHALIDONE 25 MG/1
25 TABLET ORAL DAILY
COMMUNITY
End: 2019-05-29 | Stop reason: SDUPTHER

## 2019-05-29 RX ORDER — AMLODIPINE BESYLATE 10 MG/1
10 TABLET ORAL DAILY
Qty: 90 TABLET | Refills: 3 | Status: SHIPPED | OUTPATIENT
Start: 2019-05-29 | End: 2020-06-10 | Stop reason: SDUPTHER

## 2019-05-29 RX ORDER — AMLODIPINE BESYLATE 10 MG/1
10 TABLET ORAL DAILY
COMMUNITY
End: 2019-05-29 | Stop reason: SDUPTHER

## 2019-05-29 NOTE — PROGRESS NOTES
Subjective   Roosevelt Ochoa is a 29 y.o. male.     History of Present Illness   reevaluation hypertension mild obesity history of narcolepsy.  In the interim is try to watch his weight maintain same BMI still around 3031.  Lab work last time was normal time for repeat today.  Continues on blood pressure medication.  Started back exercising more.  Continue to see sleep medicine for his narcolepsy.    The following portions of the patient's history were reviewed and updated as appropriate: allergies, current medications, past family history, past medical history, past social history, past surgical history and problem list.    Review of Systems   Constitutional: Negative for activity change, appetite change, fatigue and unexpected weight change.   HENT: Negative for trouble swallowing and voice change.    Eyes: Negative for redness and visual disturbance.   Respiratory: Negative for cough and wheezing.    Cardiovascular: Negative for chest pain and palpitations.   Gastrointestinal: Negative for abdominal pain, constipation, diarrhea, nausea and vomiting.   Genitourinary: Negative for urgency.   Musculoskeletal: Positive for myalgias (Related to exercise). Negative for joint swelling.   Neurological: Negative for syncope and headaches.   Hematological: Negative for adenopathy.   Psychiatric/Behavioral: Negative for sleep disturbance.       Objective   Physical Exam   Constitutional: He is oriented to person, place, and time. He appears well-developed.   HENT:   Head: Normocephalic.   Nose: Nose normal.   Eyes: Pupils are equal, round, and reactive to light.   Neck: Normal range of motion. No thyromegaly present.   Cardiovascular: Normal rate, regular rhythm, normal heart sounds and intact distal pulses. Exam reveals no gallop and no friction rub.   No murmur heard.  Pulmonary/Chest: Breath sounds normal.   Abdominal: Soft. He exhibits no distension and no mass. There is no tenderness.   Musculoskeletal: Normal  range of motion.   Heavily muscled   Neurological: He is alert and oriented to person, place, and time. He has normal reflexes.   Skin: Skin is warm and dry.   Psychiatric: He has a normal mood and affect. His speech is normal and behavior is normal.       Assessment/Plan   Roosevelt was seen today for follow-up.    Diagnoses and all orders for this visit:    Essential hypertension, benign  -     Comprehensive Metabolic Panel  -     Magnesium  -     amLODIPine (NORVASC) 10 MG tablet; Take 1 tablet by mouth Daily.  -     chlorthalidone (HYGROTON) 25 MG tablet; Take 1 tablet by mouth Daily.    BMI 31.0-31.9,adult    Gastroesophageal reflux disease without esophagitis    Other orders  -     pantoprazole (PROTONIX) 40 MG EC tablet; Take 1 tablet by mouth Daily. Prn reflux       on wt loss measures and programs  Counseled on summertime hydration counseled on weight control medicines and labs as ordered recheck 6 months

## 2019-05-30 ENCOUNTER — TELEPHONE (OUTPATIENT)
Dept: FAMILY MEDICINE CLINIC | Facility: CLINIC | Age: 29
End: 2019-05-30

## 2019-05-30 LAB
ALBUMIN SERPL-MCNC: 4.5 G/DL (ref 3.5–5.2)
ALBUMIN/GLOB SERPL: 1.6 G/DL
ALP SERPL-CCNC: 79 U/L (ref 39–117)
ALT SERPL W P-5'-P-CCNC: 30 U/L (ref 1–41)
ANION GAP SERPL CALCULATED.3IONS-SCNC: 14.1 MMOL/L
AST SERPL-CCNC: 33 U/L (ref 1–40)
BILIRUB SERPL-MCNC: 0.3 MG/DL (ref 0.2–1.2)
BUN BLD-MCNC: 12 MG/DL (ref 6–20)
BUN/CREAT SERPL: 12.1 (ref 7–25)
CALCIUM SPEC-SCNC: 9.5 MG/DL (ref 8.6–10.5)
CHLORIDE SERPL-SCNC: 102 MMOL/L (ref 98–107)
CO2 SERPL-SCNC: 26.9 MMOL/L (ref 22–29)
CREAT BLD-MCNC: 0.99 MG/DL (ref 0.76–1.27)
GFR SERPL CREATININE-BSD FRML MDRD: 107 ML/MIN/1.73
GLOBULIN UR ELPH-MCNC: 2.8 GM/DL
GLUCOSE BLD-MCNC: 86 MG/DL (ref 65–99)
MAGNESIUM SERPL-MCNC: 2.2 MG/DL (ref 1.6–2.6)
POTASSIUM BLD-SCNC: 4.9 MMOL/L (ref 3.5–5.2)
PROT SERPL-MCNC: 7.3 G/DL (ref 6–8.5)
SODIUM BLD-SCNC: 143 MMOL/L (ref 136–145)

## 2019-06-19 RX ORDER — DEXTROAMPHETAMINE SACCHARATE, AMPHETAMINE ASPARTATE MONOHYDRATE, DEXTROAMPHETAMINE SULFATE AND AMPHETAMINE SULFATE 5; 5; 5; 5 MG/1; MG/1; MG/1; MG/1
20 CAPSULE, EXTENDED RELEASE ORAL 2 TIMES DAILY
Qty: 60 CAPSULE | Refills: 0 | Status: SHIPPED | OUTPATIENT
Start: 2019-06-19 | End: 2019-07-18 | Stop reason: SDUPTHER

## 2019-06-19 RX ORDER — DEXTROAMPHETAMINE SACCHARATE, AMPHETAMINE ASPARTATE, DEXTROAMPHETAMINE SULFATE AND AMPHETAMINE SULFATE 1.25; 1.25; 1.25; 1.25 MG/1; MG/1; MG/1; MG/1
10 TABLET ORAL 2 TIMES DAILY
Qty: 60 TABLET | Refills: 0 | Status: SHIPPED | OUTPATIENT
Start: 2019-06-19 | End: 2019-07-18 | Stop reason: SDUPTHER

## 2019-07-18 RX ORDER — DEXTROAMPHETAMINE SACCHARATE, AMPHETAMINE ASPARTATE MONOHYDRATE, DEXTROAMPHETAMINE SULFATE AND AMPHETAMINE SULFATE 5; 5; 5; 5 MG/1; MG/1; MG/1; MG/1
20 CAPSULE, EXTENDED RELEASE ORAL 2 TIMES DAILY
Qty: 60 CAPSULE | Refills: 0 | Status: SHIPPED | OUTPATIENT
Start: 2019-07-18 | End: 2019-08-20 | Stop reason: SDUPTHER

## 2019-07-18 RX ORDER — DEXTROAMPHETAMINE SACCHARATE, AMPHETAMINE ASPARTATE, DEXTROAMPHETAMINE SULFATE AND AMPHETAMINE SULFATE 1.25; 1.25; 1.25; 1.25 MG/1; MG/1; MG/1; MG/1
10 TABLET ORAL 2 TIMES DAILY
Qty: 60 TABLET | Refills: 0 | Status: SHIPPED | OUTPATIENT
Start: 2019-07-18 | End: 2019-08-20 | Stop reason: SDUPTHER

## 2019-08-20 RX ORDER — DEXTROAMPHETAMINE SACCHARATE, AMPHETAMINE ASPARTATE, DEXTROAMPHETAMINE SULFATE AND AMPHETAMINE SULFATE 1.25; 1.25; 1.25; 1.25 MG/1; MG/1; MG/1; MG/1
10 TABLET ORAL 2 TIMES DAILY
Qty: 60 TABLET | Refills: 0 | Status: SHIPPED | OUTPATIENT
Start: 2019-08-20 | End: 2019-08-20 | Stop reason: SDUPTHER

## 2019-08-20 RX ORDER — DEXTROAMPHETAMINE SACCHARATE, AMPHETAMINE ASPARTATE MONOHYDRATE, DEXTROAMPHETAMINE SULFATE AND AMPHETAMINE SULFATE 5; 5; 5; 5 MG/1; MG/1; MG/1; MG/1
20 CAPSULE, EXTENDED RELEASE ORAL 2 TIMES DAILY
Qty: 60 CAPSULE | Refills: 0 | Status: SHIPPED | OUTPATIENT
Start: 2019-09-20 | End: 2019-08-21 | Stop reason: SDUPTHER

## 2019-08-20 RX ORDER — DEXTROAMPHETAMINE SACCHARATE, AMPHETAMINE ASPARTATE MONOHYDRATE, DEXTROAMPHETAMINE SULFATE AND AMPHETAMINE SULFATE 5; 5; 5; 5 MG/1; MG/1; MG/1; MG/1
20 CAPSULE, EXTENDED RELEASE ORAL 2 TIMES DAILY
Qty: 60 CAPSULE | Refills: 0 | Status: SHIPPED | OUTPATIENT
Start: 2019-08-20 | End: 2019-08-20 | Stop reason: SDUPTHER

## 2019-08-20 RX ORDER — DEXTROAMPHETAMINE SACCHARATE, AMPHETAMINE ASPARTATE, DEXTROAMPHETAMINE SULFATE AND AMPHETAMINE SULFATE 1.25; 1.25; 1.25; 1.25 MG/1; MG/1; MG/1; MG/1
10 TABLET ORAL 2 TIMES DAILY
Qty: 60 TABLET | Refills: 0 | Status: SHIPPED | OUTPATIENT
Start: 2019-09-20 | End: 2019-08-21 | Stop reason: SDUPTHER

## 2019-08-21 RX ORDER — DEXTROAMPHETAMINE SACCHARATE, AMPHETAMINE ASPARTATE MONOHYDRATE, DEXTROAMPHETAMINE SULFATE AND AMPHETAMINE SULFATE 5; 5; 5; 5 MG/1; MG/1; MG/1; MG/1
20 CAPSULE, EXTENDED RELEASE ORAL 2 TIMES DAILY
Qty: 60 CAPSULE | Refills: 0 | Status: SHIPPED | OUTPATIENT
Start: 2019-09-20 | End: 2019-10-03 | Stop reason: SDUPTHER

## 2019-08-21 RX ORDER — DEXTROAMPHETAMINE SACCHARATE, AMPHETAMINE ASPARTATE, DEXTROAMPHETAMINE SULFATE AND AMPHETAMINE SULFATE 1.25; 1.25; 1.25; 1.25 MG/1; MG/1; MG/1; MG/1
10 TABLET ORAL 2 TIMES DAILY
Qty: 60 TABLET | Refills: 0 | Status: SHIPPED | OUTPATIENT
Start: 2019-09-20 | End: 2019-08-27 | Stop reason: DRUGHIGH

## 2019-08-27 RX ORDER — DEXTROAMPHETAMINE SACCHARATE, AMPHETAMINE ASPARTATE, DEXTROAMPHETAMINE SULFATE AND AMPHETAMINE SULFATE 5; 5; 5; 5 MG/1; MG/1; MG/1; MG/1
20 TABLET ORAL DAILY
Qty: 60 TABLET | Refills: 0 | Status: SHIPPED | OUTPATIENT
Start: 2019-08-27 | End: 2019-10-03 | Stop reason: SDUPTHER

## 2019-09-04 DIAGNOSIS — M25.551 HIP PAIN, RIGHT: ICD-10-CM

## 2019-09-04 DIAGNOSIS — R10.31 GROIN PAIN, RIGHT: ICD-10-CM

## 2019-09-04 DIAGNOSIS — S76.011A STRAIN OF RIGHT HIP ADDUCTOR MUSCLE, INITIAL ENCOUNTER: ICD-10-CM

## 2019-09-05 RX ORDER — TRAMADOL HYDROCHLORIDE 50 MG/1
TABLET ORAL
Qty: 15 TABLET | Refills: 0 | OUTPATIENT
Start: 2019-09-05

## 2019-09-13 ENCOUNTER — OFFICE VISIT (OUTPATIENT)
Dept: SLEEP MEDICINE | Facility: HOSPITAL | Age: 29
End: 2019-09-13

## 2019-09-13 ENCOUNTER — DOCUMENTATION (OUTPATIENT)
Dept: SLEEP MEDICINE | Facility: HOSPITAL | Age: 29
End: 2019-09-13

## 2019-09-13 VITALS
BODY MASS INDEX: 29.56 KG/M2 | WEIGHT: 206.5 LBS | DIASTOLIC BLOOD PRESSURE: 95 MMHG | SYSTOLIC BLOOD PRESSURE: 142 MMHG | OXYGEN SATURATION: 99 % | HEART RATE: 88 BPM | HEIGHT: 70 IN

## 2019-09-13 DIAGNOSIS — G47.411 NARCOLEPSY WITH CATAPLEXY: Primary | ICD-10-CM

## 2019-09-13 PROCEDURE — 99214 OFFICE O/P EST MOD 30 MIN: CPT | Performed by: INTERNAL MEDICINE

## 2019-09-13 RX ORDER — MODAFINIL 100 MG/1
100 TABLET ORAL DAILY
Qty: 30 TABLET | Refills: 0 | Status: SHIPPED | OUTPATIENT
Start: 2019-09-13 | End: 2019-10-09

## 2019-09-13 NOTE — PROGRESS NOTES
PT INSURANCE FOR [employer] AUTHORIZATION ON [facility] IS -[phone] AND THE FAX NUMBER FOR THE INSURANCE IS   [phone] FOR USE WITH [employer] AUTHORIZATIONS

## 2019-09-13 NOTE — PROGRESS NOTES
Sleep Clinic Follow Up    Date: 2019  Primary Care Physician: Jhonathan Nielsen MD    Last office visit: 2019 (I reviewed this note)    CC: Follow up: narcolepsy with cataplexy    Sleep Testin. PSG on 2018, AHI of 1.4   2. MSLT on 2018, he had SOREMP in the first 2 naps with a latency average of 3.25 minutes.     Assessment and Plan:     1. Narcolepsy type I with cataplexy Established, not controlled (2)  1. On Adderall 20mg XR BID and 5mg pills up to 4x daily.  2. Continue Xyrem - consider increasing  3. Start Modafinil 10  Mg po qam  2. Consider venlafaxine, fluoxetine, clomipramine, pitolisant, or solriamfetol  3. HTN  1. Hereditary - improved      Interim History:  Since the last visit:    1) severe narcolepsy with cataplexy -  Roosevelt Ochoa has remained compliant amphetamines and Xyrem 2.5 g twice nightly.  He has not been on modafinil or armodafinil.  He continues to nap at least twice daily.  With the addition of Xyrem his sleep is more consolidated at night and he feels more alert during his periods of wakefulness although he does not have increased time of wakefulness throughout the day.      Bed time: 2130  Sleep latency: 5-10 minutes  Number of times awakens during the night: 0 since on Xyrem  Wake time: 0615  Estimated total sleep time at night: 8-9 hours  Caffeine intake: 0oz of coffee, 0oz of tea, and 0oz of soda  Alcohol intake: 0 drinks per week  Nap time: twice daily   Sleepiness with Driving: some    [place] - 15      PMHx, FH, SH reviewed and pertinent changes are:  unchanged from last office visit (date above)      REVIEW OF SYSTEMS:   Negative for chest pain, fever, cough, SOA, abdominal pain. Smoking:none      Exam:  Vitals:    19 1323   BP: 142/95   Pulse: 88   SpO2: 99%           19  1323   Weight: 93.7 kg (206 lb 8 oz)     Body mass index is 29.63 kg/m². Patient's Body mass index is 29.63 kg/m². BMI is within normal parameters. No follow-up  required..    Gen:  No acute distress, alert, oriented  Lungs:  CTA with normal effort   CV:  RRR, no M/R/G  GI:  soft, non-tender  Psych:  Appropriate affect    Past Medical History:   Diagnosis Date   • Asthma    • Blood chemistry abnormality    • Diarrhea    • Elevated blood pressure reading without diagnosis of hypertension    • Foot pain     probable tendonitis left foot      • Hip pain    • Hyperglycemia    • Hypertension    • Nausea and vomiting    • Sickle cell disease (CMS/HCC)    • Sleep apnea        Current Outpatient Medications:   •  amLODIPine (NORVASC) 10 MG tablet, Take 1 tablet by mouth Daily., Disp: 90 tablet, Rfl: 3  •  amphetamine-dextroamphetamine (ADDERALL) 20 MG tablet, Take 1 tablet by mouth Daily., Disp: 60 tablet, Rfl: 0  •  [START ON 9/20/2019] amphetamine-dextroamphetamine XR (ADDERALL XR) 20 MG 24 hr capsule, Take 1 capsule by mouth 2 (Two) Times a Day, Disp: 60 capsule, Rfl: 0  •  chlorthalidone (HYGROTON) 25 MG tablet, Take 1 tablet by mouth Daily., Disp: 90 tablet, Rfl: 3  •  pantoprazole (PROTONIX) 40 MG EC tablet, Take 1 tablet by mouth Daily. Prn reflux, Disp: 30 tablet, Rfl: 2    Total time 25 min, more than half spent in face to face counseling and coordination of care.    RTC in 6 months     This document has been electronically signed by Bhargav Etienne MD on September 13, 2019         CC: Jhonathan Nielsen MD          No ref. provider found

## 2019-10-03 RX ORDER — DEXTROAMPHETAMINE SACCHARATE, AMPHETAMINE ASPARTATE, DEXTROAMPHETAMINE SULFATE AND AMPHETAMINE SULFATE 5; 5; 5; 5 MG/1; MG/1; MG/1; MG/1
20 TABLET ORAL DAILY
Qty: 60 TABLET | Refills: 0 | Status: SHIPPED | OUTPATIENT
Start: 2019-10-03 | End: 2019-10-30 | Stop reason: SDUPTHER

## 2019-10-03 RX ORDER — DEXTROAMPHETAMINE SACCHARATE, AMPHETAMINE ASPARTATE MONOHYDRATE, DEXTROAMPHETAMINE SULFATE AND AMPHETAMINE SULFATE 5; 5; 5; 5 MG/1; MG/1; MG/1; MG/1
20 CAPSULE, EXTENDED RELEASE ORAL 2 TIMES DAILY
Qty: 60 CAPSULE | Refills: 0 | Status: SHIPPED | OUTPATIENT
Start: 2019-10-03 | End: 2019-10-30 | Stop reason: SDUPTHER

## 2019-10-09 DIAGNOSIS — G47.411 NARCOLEPSY WITH CATAPLEXY: Primary | ICD-10-CM

## 2019-10-09 RX ORDER — ARMODAFINIL 150 MG/1
150 TABLET ORAL DAILY
Qty: 30 TABLET | Refills: 3 | Status: SHIPPED | OUTPATIENT
Start: 2019-10-09 | End: 2019-10-30 | Stop reason: SDUPTHER

## 2019-10-30 RX ORDER — DEXTROAMPHETAMINE SACCHARATE, AMPHETAMINE ASPARTATE, DEXTROAMPHETAMINE SULFATE AND AMPHETAMINE SULFATE 5; 5; 5; 5 MG/1; MG/1; MG/1; MG/1
20 TABLET ORAL DAILY
Qty: 60 TABLET | Refills: 0 | Status: SHIPPED | OUTPATIENT
Start: 2019-10-30 | End: 2019-12-17 | Stop reason: SDUPTHER

## 2019-10-30 RX ORDER — ARMODAFINIL 150 MG/1
150 TABLET ORAL DAILY
Qty: 30 TABLET | Refills: 3 | Status: SHIPPED | OUTPATIENT
Start: 2019-10-30 | End: 2019-12-17 | Stop reason: SDUPTHER

## 2019-10-30 RX ORDER — DEXTROAMPHETAMINE SACCHARATE, AMPHETAMINE ASPARTATE MONOHYDRATE, DEXTROAMPHETAMINE SULFATE AND AMPHETAMINE SULFATE 5; 5; 5; 5 MG/1; MG/1; MG/1; MG/1
20 CAPSULE, EXTENDED RELEASE ORAL 2 TIMES DAILY
Qty: 60 CAPSULE | Refills: 0 | Status: SHIPPED | OUTPATIENT
Start: 2019-10-30 | End: 2019-12-17 | Stop reason: SDUPTHER

## 2019-12-03 ENCOUNTER — OFFICE VISIT (OUTPATIENT)
Dept: FAMILY MEDICINE CLINIC | Facility: CLINIC | Age: 29
End: 2019-12-03

## 2019-12-03 VITALS
WEIGHT: 221 LBS | DIASTOLIC BLOOD PRESSURE: 86 MMHG | SYSTOLIC BLOOD PRESSURE: 128 MMHG | BODY MASS INDEX: 31.64 KG/M2 | HEIGHT: 70 IN

## 2019-12-03 DIAGNOSIS — G47.429 NARCOLEPSY DUE TO UNDERLYING CONDITION WITHOUT CATAPLEXY: Chronic | ICD-10-CM

## 2019-12-03 DIAGNOSIS — I10 ESSENTIAL HYPERTENSION, BENIGN: Primary | Chronic | ICD-10-CM

## 2019-12-03 PROBLEM — T14.8XXA LIGAMENT TEAR: Chronic | Status: RESOLVED | Noted: 2018-09-28 | Resolved: 2019-12-03

## 2019-12-03 PROCEDURE — 99213 OFFICE O/P EST LOW 20 MIN: CPT | Performed by: FAMILY MEDICINE

## 2019-12-03 NOTE — PROGRESS NOTES
Subjective   Roosevelt Ochoa is a 29 y.o. male.     History of Present Illness   evaluation hypertension mild elevated weight history of narcolepsy.  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  Blood pressure is holding.  Medicines have remained the same.  Overall stable.  Narcolepsy well controlled on medication  HPI    The following portions of the patient's history were reviewed and updated as appropriate: allergies, current medications, past family history, past medical history, past social history, past surgical history and problem list.    Review of Systems  Review of Systems   Constitutional: Negative for activity change, appetite change, fatigue and unexpected weight change.   HENT: Negative for trouble swallowing and voice change.    Eyes: Negative for redness and visual disturbance.   Respiratory: Negative for cough and wheezing.    Cardiovascular: Negative for chest pain and palpitations.   Gastrointestinal: Negative for abdominal pain, constipation, diarrhea, nausea and vomiting.   Genitourinary: Negative for urgency.   Musculoskeletal: Negative for joint swelling.   Neurological: Negative for syncope and headaches.   Hematological: Negative for adenopathy.   Psychiatric/Behavioral: Negative for sleep disturbance.       Objective   Physical Exam  Physical Exam   Constitutional: He is oriented to person, place, and time. He appears well-developed.   HENT:   Head: Normocephalic.   Right Ear: External ear normal.   Nose: Nose normal.   Eyes: Pupils are equal, round, and reactive to light.   Neck: Normal range of motion. No thyromegaly present.   Cardiovascular: Normal rate, regular rhythm, normal heart sounds and intact distal pulses. Exam reveals no gallop and no friction rub.   No murmur heard.  Pulmonary/Chest: Breath sounds normal.   Abdominal: Soft. He exhibits no distension and no mass. There is no tenderness.   Musculoskeletal: Normal range  "of motion.   Neurological: He is alert and oriented to person, place, and time. He has normal reflexes.   Skin: Skin is warm and dry.   Psychiatric: He has a normal mood and affect.         Visit Vitals  /90   Ht 177.8 cm (70\")   Wt 100 kg (221 lb)   BMI 31.71 kg/m²     Body mass index is 31.71 kg/m².      Assessment/Plan   Roosevelt was seen today for hypertension.    Diagnoses and all orders for this visit:    Essential hypertension, benign    BMI 31.0-31.9,adult    Narcolepsy due to underlying condition without cataplexy    Counseled maintaining lifestyle measures counseled continuing weight loss.  Continue medications for 6 months and recheck at that time be time for lab work declines flu vaccine  "

## 2019-12-17 DIAGNOSIS — G47.411 NARCOLEPSY WITH CATAPLEXY: Primary | ICD-10-CM

## 2019-12-17 RX ORDER — DEXTROAMPHETAMINE SACCHARATE, AMPHETAMINE ASPARTATE MONOHYDRATE, DEXTROAMPHETAMINE SULFATE AND AMPHETAMINE SULFATE 5; 5; 5; 5 MG/1; MG/1; MG/1; MG/1
20 CAPSULE, EXTENDED RELEASE ORAL 2 TIMES DAILY
Qty: 60 CAPSULE | Refills: 0 | Status: SHIPPED | OUTPATIENT
Start: 2019-12-17 | End: 2020-02-18 | Stop reason: SDUPTHER

## 2019-12-17 RX ORDER — ARMODAFINIL 150 MG/1
150 TABLET ORAL DAILY
Qty: 30 TABLET | Refills: 3 | Status: SHIPPED | OUTPATIENT
Start: 2019-12-17 | End: 2020-02-13 | Stop reason: SDUPTHER

## 2019-12-17 RX ORDER — DEXTROAMPHETAMINE SACCHARATE, AMPHETAMINE ASPARTATE, DEXTROAMPHETAMINE SULFATE AND AMPHETAMINE SULFATE 5; 5; 5; 5 MG/1; MG/1; MG/1; MG/1
20 TABLET ORAL DAILY
Qty: 60 TABLET | Refills: 0 | Status: SHIPPED | OUTPATIENT
Start: 2019-12-17 | End: 2020-02-13 | Stop reason: SDUPTHER

## 2020-02-13 DIAGNOSIS — G47.411 NARCOLEPSY WITH CATAPLEXY: ICD-10-CM

## 2020-02-13 RX ORDER — DEXTROAMPHETAMINE SACCHARATE, AMPHETAMINE ASPARTATE, DEXTROAMPHETAMINE SULFATE AND AMPHETAMINE SULFATE 5; 5; 5; 5 MG/1; MG/1; MG/1; MG/1
20 TABLET ORAL DAILY
Qty: 60 TABLET | Refills: 0 | Status: SHIPPED | OUTPATIENT
Start: 2020-02-13 | End: 2020-03-24

## 2020-02-13 RX ORDER — ARMODAFINIL 150 MG/1
150 TABLET ORAL DAILY
Qty: 30 TABLET | Refills: 3 | Status: SHIPPED | OUTPATIENT
Start: 2020-02-13 | End: 2020-03-24 | Stop reason: SDUPTHER

## 2020-02-18 DIAGNOSIS — G47.411 NARCOLEPSY WITH CATAPLEXY: ICD-10-CM

## 2020-02-18 RX ORDER — DEXTROAMPHETAMINE SACCHARATE, AMPHETAMINE ASPARTATE MONOHYDRATE, DEXTROAMPHETAMINE SULFATE AND AMPHETAMINE SULFATE 5; 5; 5; 5 MG/1; MG/1; MG/1; MG/1
20 CAPSULE, EXTENDED RELEASE ORAL 2 TIMES DAILY
Qty: 60 CAPSULE | Refills: 0 | Status: SHIPPED | OUTPATIENT
Start: 2020-02-18 | End: 2020-03-19

## 2020-02-18 RX ORDER — DEXTROAMPHETAMINE SACCHARATE, AMPHETAMINE ASPARTATE MONOHYDRATE, DEXTROAMPHETAMINE SULFATE AND AMPHETAMINE SULFATE 5; 5; 5; 5 MG/1; MG/1; MG/1; MG/1
20 CAPSULE, EXTENDED RELEASE ORAL 2 TIMES DAILY
Qty: 60 CAPSULE | Refills: 0 | Status: SHIPPED | OUTPATIENT
Start: 2020-03-20 | End: 2020-03-24

## 2020-03-24 DIAGNOSIS — G47.411 NARCOLEPSY WITH CATAPLEXY: ICD-10-CM

## 2020-03-24 RX ORDER — ARMODAFINIL 150 MG/1
150 TABLET ORAL DAILY
Qty: 30 TABLET | Refills: 2 | Status: SHIPPED | OUTPATIENT
Start: 2020-03-24 | End: 2020-03-24

## 2020-03-24 RX ORDER — DEXTROAMPHETAMINE SACCHARATE, AMPHETAMINE ASPARTATE MONOHYDRATE, DEXTROAMPHETAMINE SULFATE AND AMPHETAMINE SULFATE 5; 5; 5; 5 MG/1; MG/1; MG/1; MG/1
20 CAPSULE, EXTENDED RELEASE ORAL 2 TIMES DAILY
Qty: 60 CAPSULE | Refills: 0 | Status: SHIPPED | OUTPATIENT
Start: 2020-04-24 | End: 2020-04-07 | Stop reason: SDUPTHER

## 2020-03-24 RX ORDER — ARMODAFINIL 150 MG/1
150 TABLET ORAL DAILY
Qty: 30 TABLET | Refills: 5 | Status: SHIPPED | OUTPATIENT
Start: 2020-03-24 | End: 2020-04-23

## 2020-03-24 RX ORDER — DEXTROAMPHETAMINE SACCHARATE, AMPHETAMINE ASPARTATE MONOHYDRATE, DEXTROAMPHETAMINE SULFATE AND AMPHETAMINE SULFATE 5; 5; 5; 5 MG/1; MG/1; MG/1; MG/1
20 CAPSULE, EXTENDED RELEASE ORAL 2 TIMES DAILY
Qty: 60 CAPSULE | Refills: 0 | Status: SHIPPED | OUTPATIENT
Start: 2020-03-24 | End: 2020-04-23

## 2020-03-26 ENCOUNTER — TELEPHONE (OUTPATIENT)
Dept: FAMILY MEDICINE CLINIC | Facility: CLINIC | Age: 30
End: 2020-03-26

## 2020-04-07 DIAGNOSIS — G47.411 NARCOLEPSY WITH CATAPLEXY: ICD-10-CM

## 2020-04-07 RX ORDER — DEXTROAMPHETAMINE SACCHARATE, AMPHETAMINE ASPARTATE MONOHYDRATE, DEXTROAMPHETAMINE SULFATE AND AMPHETAMINE SULFATE 5; 5; 5; 5 MG/1; MG/1; MG/1; MG/1
20 CAPSULE, EXTENDED RELEASE ORAL 2 TIMES DAILY
Qty: 60 CAPSULE | Refills: 0 | Status: SHIPPED | OUTPATIENT
Start: 2020-04-24 | End: 2020-05-24

## 2020-05-27 DIAGNOSIS — G47.411 NARCOLEPSY WITH CATAPLEXY: Primary | ICD-10-CM

## 2020-05-27 RX ORDER — DEXTROAMPHETAMINE SACCHARATE, AMPHETAMINE ASPARTATE, DEXTROAMPHETAMINE SULFATE AND AMPHETAMINE SULFATE 5; 5; 5; 5 MG/1; MG/1; MG/1; MG/1
20 TABLET ORAL 2 TIMES DAILY
Qty: 60 TABLET | Refills: 0 | Status: SHIPPED | OUTPATIENT
Start: 2020-05-27 | End: 2020-06-18 | Stop reason: SDUPTHER

## 2020-06-10 ENCOUNTER — DOCUMENTATION (OUTPATIENT)
Dept: SLEEP MEDICINE | Facility: HOSPITAL | Age: 30
End: 2020-06-10

## 2020-06-10 ENCOUNTER — LAB (OUTPATIENT)
Dept: LAB | Facility: HOSPITAL | Age: 30
End: 2020-06-10

## 2020-06-10 ENCOUNTER — OFFICE VISIT (OUTPATIENT)
Dept: FAMILY MEDICINE CLINIC | Facility: CLINIC | Age: 30
End: 2020-06-10

## 2020-06-10 VITALS
HEIGHT: 70 IN | WEIGHT: 218 LBS | BODY MASS INDEX: 31.21 KG/M2 | SYSTOLIC BLOOD PRESSURE: 120 MMHG | DIASTOLIC BLOOD PRESSURE: 78 MMHG

## 2020-06-10 DIAGNOSIS — M22.41 CHONDROMALACIA OF RIGHT PATELLA: ICD-10-CM

## 2020-06-10 DIAGNOSIS — L73.8 FOLLICULITIS BARBAE: ICD-10-CM

## 2020-06-10 DIAGNOSIS — I10 ESSENTIAL HYPERTENSION, BENIGN: Primary | Chronic | ICD-10-CM

## 2020-06-10 DIAGNOSIS — G47.429 NARCOLEPSY DUE TO UNDERLYING CONDITION WITHOUT CATAPLEXY: Chronic | ICD-10-CM

## 2020-06-10 DIAGNOSIS — M25.561 CHRONIC PAIN OF RIGHT KNEE: ICD-10-CM

## 2020-06-10 DIAGNOSIS — G89.29 CHRONIC PAIN OF RIGHT KNEE: ICD-10-CM

## 2020-06-10 PROCEDURE — 80053 COMPREHEN METABOLIC PANEL: CPT | Performed by: FAMILY MEDICINE

## 2020-06-10 PROCEDURE — 36415 COLL VENOUS BLD VENIPUNCTURE: CPT | Performed by: FAMILY MEDICINE

## 2020-06-10 PROCEDURE — 99214 OFFICE O/P EST MOD 30 MIN: CPT | Performed by: FAMILY MEDICINE

## 2020-06-10 PROCEDURE — 83735 ASSAY OF MAGNESIUM: CPT | Performed by: FAMILY MEDICINE

## 2020-06-10 RX ORDER — CHLORTHALIDONE 25 MG/1
25 TABLET ORAL DAILY
Qty: 90 TABLET | Refills: 3 | Status: SHIPPED | OUTPATIENT
Start: 2020-06-10 | End: 2021-04-06 | Stop reason: SDUPTHER

## 2020-06-10 RX ORDER — MODAFINIL 100 MG/1
100 TABLET ORAL DAILY
COMMUNITY
End: 2020-06-11 | Stop reason: DRUGHIGH

## 2020-06-10 RX ORDER — AMLODIPINE BESYLATE 10 MG/1
10 TABLET ORAL DAILY
Qty: 90 TABLET | Refills: 3 | Status: SHIPPED | OUTPATIENT
Start: 2020-06-10 | End: 2021-04-06 | Stop reason: SDUPTHER

## 2020-06-10 NOTE — PROGRESS NOTES
Subjective    Roosevelt Ochoa is a 30 y.o. male.  Reevaluation hypertension narcolepsy history of chronic knee pain right in the interim did not get to go to PT for his knee pain we referred.  Continues to have problems when trying to exercise.  Weights gone up a little bit due to the pandemic.  Continues to see intake medication for narcolepsy.  Is developed some pseudofolliculitis barbae and we have counseled on treatment of same.  Time for lab work.    History of Present Illness   HPI    The following portions of the patient's history were reviewed and updated as appropriate: allergies, current medications, past family history, past medical history, past social history, past surgical history and problem list.    Review of Systems  Review of Systems   Constitutional: Negative for activity change, appetite change, fatigue and unexpected weight change.   HENT: Negative for trouble swallowing and voice change.    Eyes: Negative for redness and visual disturbance.   Respiratory: Negative for cough and wheezing.    Cardiovascular: Negative for chest pain and palpitations.   Gastrointestinal: Negative for abdominal pain, constipation, diarrhea, nausea and vomiting.   Genitourinary: Negative for urgency.   Musculoskeletal: Positive for arthralgias. Negative for joint swelling.   Neurological: Negative for syncope and headaches.   Hematological: Negative for adenopathy.   Psychiatric/Behavioral: Negative for sleep disturbance.       Objective   Physical Exam  Physical Exam   Constitutional: He is oriented to person, place, and time. He appears well-developed.   HENT:   Head: Normocephalic.   Nose: Nose normal.   Eyes: Pupils are equal, round, and reactive to light.   Neck: Normal range of motion. No thyromegaly present.   Cardiovascular: Normal rate, regular rhythm, normal heart sounds and intact distal pulses. Exam reveals no gallop and no friction rub.   No murmur heard.  Pulmonary/Chest: Breath sounds normal.  "  Abdominal: Soft. He exhibits no distension and no mass. There is no tenderness.   Musculoskeletal: Normal range of motion.        Right knee: Tenderness found. Lateral joint line tenderness noted.   Patellar grating and clicking.  Of plica formation right lateral collateral area.  Negative drawer pull   Neurological: He is alert and oriented to person, place, and time. He has normal reflexes.   Skin: Skin is warm and dry.   Mild pseudofolliculitis barbae a skin line   Psychiatric: He has a normal mood and affect. His speech is normal and behavior is normal.         Visit Vitals  /78   Ht 177.8 cm (70\")   Wt 98.9 kg (218 lb)   BMI 31.28 kg/m²     Body mass index is 31.28 kg/m².      Assessment/Plan   Roosevelt was seen today for hypertension.    Diagnoses and all orders for this visit:    Essential hypertension, benign  -     Comprehensive Metabolic Panel  -     Magnesium  -     amLODIPine (NORVASC) 10 MG tablet; Take 1 tablet by mouth Daily.  -     chlorthalidone (HYGROTON) 25 MG tablet; Take 1 tablet by mouth Daily.    BMI 31.0-31.9,adult    Narcolepsy due to underlying condition without cataplexy    Chronic pain of right knee  -     Ambulatory Referral to Physical Therapy Evaluate and treat; ROM, Strengthening    Chondromalacia of right patella  -     Ambulatory Referral to Physical Therapy Evaluate and treat; ROM, Strengthening    Folliculitis renetta    Counseled mainly on lifestyle measures therapy to restart hydration medication lab work recheck 6 months  "

## 2020-06-11 LAB
ALBUMIN SERPL-MCNC: 4.6 G/DL (ref 3.5–5.2)
ALBUMIN/GLOB SERPL: 1.7 G/DL
ALP SERPL-CCNC: 79 U/L (ref 39–117)
ALT SERPL W P-5'-P-CCNC: 33 U/L (ref 1–41)
ANION GAP SERPL CALCULATED.3IONS-SCNC: 10.5 MMOL/L (ref 5–15)
AST SERPL-CCNC: 32 U/L (ref 1–40)
BILIRUB SERPL-MCNC: 0.3 MG/DL (ref 0.2–1.2)
BUN BLD-MCNC: 9 MG/DL (ref 6–20)
BUN/CREAT SERPL: 9.6 (ref 7–25)
CALCIUM SPEC-SCNC: 9.6 MG/DL (ref 8.6–10.5)
CHLORIDE SERPL-SCNC: 101 MMOL/L (ref 98–107)
CO2 SERPL-SCNC: 27.5 MMOL/L (ref 22–29)
CREAT BLD-MCNC: 0.94 MG/DL (ref 0.76–1.27)
GFR SERPL CREATININE-BSD FRML MDRD: 113 ML/MIN/1.73
GLOBULIN UR ELPH-MCNC: 2.7 GM/DL
GLUCOSE BLD-MCNC: 84 MG/DL (ref 65–99)
MAGNESIUM SERPL-MCNC: 2.1 MG/DL (ref 1.6–2.6)
POTASSIUM BLD-SCNC: 4.5 MMOL/L (ref 3.5–5.2)
PROT SERPL-MCNC: 7.3 G/DL (ref 6–8.5)
SODIUM BLD-SCNC: 139 MMOL/L (ref 136–145)

## 2020-06-16 ENCOUNTER — HOSPITAL ENCOUNTER (OUTPATIENT)
Dept: PHYSICAL THERAPY | Facility: HOSPITAL | Age: 30
Setting detail: THERAPIES SERIES
Discharge: HOME | End: 2020-06-16

## 2020-06-16 DIAGNOSIS — M22.41 CHONDROMALACIA OF RIGHT PATELLA: ICD-10-CM

## 2020-06-16 DIAGNOSIS — M21.42 PES PLANUS OF BOTH FEET: Primary | ICD-10-CM

## 2020-06-16 DIAGNOSIS — G89.29 CHRONIC PAIN OF RIGHT KNEE: Primary | ICD-10-CM

## 2020-06-16 DIAGNOSIS — M25.561 CHRONIC PAIN OF RIGHT KNEE: Primary | ICD-10-CM

## 2020-06-16 DIAGNOSIS — M21.41 PES PLANUS OF BOTH FEET: Primary | ICD-10-CM

## 2020-06-16 PROCEDURE — 97162 PT EVAL MOD COMPLEX 30 MIN: CPT | Performed by: PHYSICAL THERAPIST

## 2020-06-16 NOTE — THERAPY EVALUATION
Outpatient Physical Therapy Ortho Initial Evaluation  [facility]     Patient Name: Roosevelt Ochoa  : 1990  MRN: [id number]  Today's Date: 2020      Visit Date: 2020  Attendance:  (Medicaid)  Subjective Improvement: n/a  Next MD Appt: 2020  Recert Date: 2020    Therapy Diagnosis: 1) R knee pain; 2) structural pes planus B; 3) possible R hip impingement         Past Medical History:   Diagnosis Date   • Asthma    • Blood chemistry abnormality    • Diarrhea    • Elevated blood pressure reading without diagnosis of hypertension    • Foot pain     probable tendonitis left foot      • Hip pain    • Hyperglycemia    • Hypertension    • Nausea and vomiting    • Sickle cell disease (CMS/HCC)    • Sleep apnea         Past Surgical History:   Procedure Laterality Date   • DENTAL PROCEDURE         Current Outpatient Medications:   •  amLODIPine (NORVASC) 10 MG tablet, Take 1 tablet by mouth Daily., Disp: 90 tablet, Rfl: 3  •  amphetamine-dextroamphetamine (ADDERALL) 20 MG tablet, Take 1 tablet by mouth 2 (Two) Times a Day for 30 days., Disp: 60 tablet, Rfl: 0  •  chlorthalidone (HYGROTON) 25 MG tablet, Take 1 tablet by mouth Daily., Disp: 90 tablet, Rfl: 3  •  modafinil (PROVIGIL) 200 MG tablet, Take 1 tablet by mouth Daily for 30 days., Disp: 30 tablet, Rfl: 0  •  modafinil (PROVIGIL) 200 MG tablet, Take 2 tablets by mouth Daily for 30 days., Disp: 60 tablet, Rfl: 0  •  pantoprazole (PROTONIX) 40 MG EC tablet, Take 1 tablet by mouth Daily. Prn reflux, Disp: 30 tablet, Rfl: 2  •  Sodium Oxybate (XYREM PO), Take 60 mL by mouth 2 (two) times a day., Disp: , Rfl:     Allergies   Allergen Reactions   • Lisinopril Anaphylaxis       Visit Dx:     ICD-10-CM ICD-9-CM   1. Chronic pain of right knee M25.561 719.46    G89.29 338.29   2. Chondromalacia of right patella M22.41 717.7         Patient History     Row Name 20 1100             History    Chief Complaint  Difficulty with daily  "activities;Pain;Difficulty Walking  -      Type of Pain  Knee pain right  -SS      Date Current Problem(s) Began  -- Nov/Dec 2019  -      Brief Description of Current Complaint  Injured his knee during a basketball game. States that he may have landed in a twist. He is wearing a knee sleeve that helps his knee feel more stable. He notes that he has weak ankles and low arches. Knee pain with stairs, flexion, squatting, jumping. Has a tingling sensation in the anterolateral R knee. He feels some pain/tingling in the Achilles tendon and foot. He notes that when he squats, his hip tend to adduct. Has recently moved to an apartment and going up and down the stairs irritates it. Can feel something \"grinding and crunching\" when he moves his knee. Single male with children. Lives in an apartment with 25 stairs to enter. There are no stairs in the apartment.   -SS      Patient/Caregiver Goals  Relieve pain;Return to prior level of function  -      Current Tobacco Use  no  -SS      Smoking Status  former  -SS      Patient's Rating of General Health  Very good  -      Occupation/sports/leisure activities  Unemployed. Hobbies: working out, play games  -         Pain     Pain Location  Knee;Foot;Ankle right  -SS      Pain at Present  2  -SS      Pain at Best  2 over past 1 month  -SS      Pain at Worst  6;7 over past 1 month  -      Pain Frequency  Constant/continuous  -      Pain Description  -- \"really uncomfortable\"  -      What Performance Factors Make the Current Problem(s) WORSE?  stairs, squatting, flexion, jumping  -      What Performance Factors Make the Current Problem(s) BETTER?  rest with knee extended  -      Is your sleep disturbed?  Yes narcolepsy  -SS      Is medication used to assist with sleep?  Yes  -SS      Difficulties at work?  n/a  -SS      Difficulties with ADL's?  pain  -SS      Difficulties with recreational activities?  pain  -SS         Fall Risk Assessment    Any falls in the past " year:  No  -SS      Does patient have a fear of falling  No  -SS         Daily Activities    Primary Language  English  -SS         Safety    Are you being hurt, hit, or frightened by anyone at home or in your life?  No  -SS      Are you being neglected by a caregiver  No  -SS        User Key  (r) = Recorded By, (t) = Taken By, (c) = Cosigned By    Initials Name Provider Type     Roosevelt Aguilar, PT DPT Physical Therapist          PT Ortho     Row Name 06/16/20 1100       Subjective Comments    Subjective Comments  see Therapy Patient History  -SS       Precautions and Contraindications    Precautions/Limitations  no known precautions/limitations  -SS       Subjective Pain    Able to rate subjective pain?  yes  -SS    Pre-Treatment Pain Level  2  -SS    Post-Treatment Pain Level  1  -SS       Posture/Observations    Posture/Observations Comments  Presents this date wearing a knee sleeve. Structural pes planus. Stands with R LE internally rotated. Toe out B during gait. Decreased bulk R vastus lateralis compared to L.   -SS       Hip Special Tests    TANI (hip vs SI pathology)  Right:;Negative  -SS    Hip scour test (labral vs hip pathology)  Right:;Positive pain medial hip  -SS    FAIR test (piriformis syndrome)  Right:;Positive pain medial hip; medial hip pain with long axis IR  -SS       Knee Special Tests    Patellar grind test (chondromalacia patella)  Right:;Negative  -SS    Veras test (chondromalacia patella)  Right:;Negative  -SS    Plica stutter test (plica syndrome)  Right:;Positive  -SS    Hughston’s plica test (plica syndrome)  Right:;Negative  -SS    Knee Special Tests Comments  Palpable crepitus in R patella when extending the knee against gravity. TTP anterolateral knee.  -SS       Right Lower Ext    Rt Knee Extension/Flexion AROM  0-130 deg; pain with extension and flexion.  -SS    Rt Ankle Dorsiflexion AROM  2 deg with knee extended; 7 deg with knee flexed  -SS    Rt Ankle  Plantarflexion AROM  43 deg; L LBP and pulling in lateral toes and calf  -SS    Rt Ankle Inversion AROM  38 deg; pain  -SS    Rt Ankle Eversion AROM  18 deg; pain  -SS       MMT Right Lower Ext    Rt Hip Flexion MMT, Gross Movement  (5/5) normal pain hip and low back  -SS    Rt Hip Extension MMT, Gross Movement  (5/5) normal  -SS    Rt Hip ABduction MMT, Gross Movement  (5/5) normal  -SS    Rt Hip ADduction MMT, Gross Movement  (5/5) normal  -SS    Rt Hip Internal (Medial) Rotation MMT, Gross Movement  (5/5) normal  -SS    Rt Hip External (Lateral) Rotation MMT, Gross Movement  (5/5) normal  -SS    Rt Knee Extension MMT, Gross Movement  (5/5) normal lateral knee pain  -SS    Rt Knee Flexion MMT, Gross Movement  (5/5) normal  -SS    Rt Ankle Plantarflexion MMT, Gross Movement  (5/5) normal  -SS    Rt Ankle Dorsiflexion MMT, Gross Movement  (5/5) normal  -SS    Rt Ankle Subtalar Inversion MT, Gross Movement  (5/5) normal  -SS    Rt Ankle Subtalar Eversion MMT, Gross Movement  (5/5) normal  -SS      User Key  (r) = Recorded By, (t) = Taken By, (c) = Cosigned By    Initials Name Provider Type    Roosevelt Lobato, PT DPT Physical Therapist          Therapy Education  Education Details: shoe insert wear, therapy plan  How Provided: Verbal  Provided to: Patient  Level of Understanding: Verbalized     PT OP Goals     Row Name 06/16/20 1100          PT Short Term Goals    STG Date to Achieve  07/07/20  -     STG 1  Note a >/= 25% subjective improvement.  -     STG 2  LEFS score to be >/= 51/80.  -     STG 3  R ankle active DF to be >/= 7 deg when knee is extended.  -        Long Term Goals    LTG Date to Achieve  07/28/20  -     LTG 1  Independent with HEP/self-management.  -     LTG 2  Minimal knee pain with squatting and stairs.  -     LTG 3  LEFS score to be >/= 60/80.  -     LTG 4  Resume PLOF.  -        Time Calculation    PT Goal Re-Cert Due Date  07/07/20  -       User Key  (r) = Recorded  By, (t) = Taken By, (c) = Cosigned By    Initials Name Provider Type    Roosevelt Lobato, PT DPT Physical Therapist          PT Assessment/Plan     Row Name 06/16/20 1100          PT Assessment    Functional Limitations  Limitation in home management;Limitations in community activities;Limitations in functional capacity and performance;Performance in leisure activities;Performance in self-care ADL;Performance in sport activities  -     Impairments  Pain;Range of motion;Posture  -     Assessment Comments  Patient has subacute/chronic knee pain that has contributions from hip and foot/ankle complex. Patient has structural pes planus. I dispensed the patient Vasy[employer]orber for his B shoes this date. Patient's initial impression is that it helps his foot and ankle pain. I have concern regarding hip impingement may be present as well.  -     Rehab Potential  Good barrier: structural pes planus  -     Patient/caregiver participated in establishment of treatment plan and goals  Yes  -SS     Patient would benefit from skilled therapy intervention  Yes  -SS        PT Plan    PT Frequency  2x/week  -     Predicted Duration of Therapy Intervention (Therapy Eval)  4-6 weeks  -     PT Plan Comments  Recommend custom orthotics. Hip, knee, ankle, and foot stretching and strengthening, stretch gastroc and soleus individually. Foot intrinsic strengthening. Work on body mechanics with squatting and stairs. Manual therapy.  -       User Key  (r) = Recorded By, (t) = Taken By, (c) = Cosigned By    Initials Name Provider Type    Roosevelt Lobato, PT DPT Physical Therapist             Outcome Measure Options: Lower Extremity Functional Scale (LEFS)  Lower Extremity Functional Index  Any of your usual work, housework or school activities: Extreme difficulty or unable to perform activity  Your usual hobbies, recreational or sporting activities: A little bit of difficulty  Getting into or out of the  bath: Quite a bit of difficulty  Walking between rooms: Quite a bit of difficulty  Putting on your shoes or socks: Quite a bit of difficulty  Squatting: A little bit of difficulty  Lifting an object, like a bag of groceries from the floor: Extreme difficulty or unable to perform activity  Performing light activities around your home: Quite a bit of difficulty  Performing heavy activities around your home: Moderate difficulty  Getting into or out of a car: A little bit of difficulty  Walking 2 blocks: A little bit of difficulty  Walking a mile: A little bit of difficulty  Going up or down 10 stairs (about 1 flight of stairs): A little bit of difficulty  Standing for 1 hour: A little bit of difficulty  Sitting for 1 hour: Moderate difficulty  Running on even ground: A little bit of difficulty  Running on uneven ground: A little bit of difficulty  Making sharp turns while running fast: A little bit of difficulty  Hopping: A little bit of difficulty  Rolling over in bed: Quite a bit of difficulty  Total: 42      Time Calculation:     Start Time: 1104  Stop Time: 1153  Time Calculation (min): 49 min     Therapy Charges for Today     Code Description Service Date Service Provider Modifiers Qty    16759794810 HC PT EVAL MOD COMPLEXITY 3 6/16/2020 Roosevelt Aguilar, PT DPT GP 1                   Roosevelt Aguilar, PT, DPT, CHT  6/16/2020

## 2020-06-18 ENCOUNTER — APPOINTMENT (OUTPATIENT)
Dept: PHYSICAL THERAPY | Facility: HOSPITAL | Age: 30
End: 2020-06-18

## 2020-06-18 DIAGNOSIS — G47.411 NARCOLEPSY WITH CATAPLEXY: ICD-10-CM

## 2020-06-18 RX ORDER — DEXTROAMPHETAMINE SACCHARATE, AMPHETAMINE ASPARTATE, DEXTROAMPHETAMINE SULFATE AND AMPHETAMINE SULFATE 5; 5; 5; 5 MG/1; MG/1; MG/1; MG/1
20 TABLET ORAL 2 TIMES DAILY
Qty: 60 TABLET | Refills: 0 | Status: SHIPPED | OUTPATIENT
Start: 2020-06-18 | End: 2020-07-02 | Stop reason: SDUPTHER

## 2020-06-18 NOTE — PROGRESS NOTES
Request for Adderall refill.      Last seen June 2020 for narcolepsy w/ cataplexy.   On Xyrem and Adderall 20mg BID.      Justino reviewed, #[id number] ; unremarkable.      Will bridge Rx.      Plan for clinic f/u in 3 months.      Jacob Bland II, MD  06/18/20 @ 11:56 AM

## 2020-06-22 ENCOUNTER — APPOINTMENT (OUTPATIENT)
Dept: PHYSICAL THERAPY | Facility: HOSPITAL | Age: 30
End: 2020-06-22

## 2020-06-23 ENCOUNTER — APPOINTMENT (OUTPATIENT)
Dept: PHYSICAL THERAPY | Facility: HOSPITAL | Age: 30
End: 2020-06-23

## 2020-06-24 ENCOUNTER — HOSPITAL ENCOUNTER (OUTPATIENT)
Dept: PHYSICAL THERAPY | Facility: HOSPITAL | Age: 30
Setting detail: THERAPIES SERIES
Discharge: HOME | End: 2020-06-24

## 2020-06-24 DIAGNOSIS — G89.29 CHRONIC PAIN OF RIGHT KNEE: Primary | ICD-10-CM

## 2020-06-24 DIAGNOSIS — M25.551 PAIN OF RIGHT HIP JOINT: ICD-10-CM

## 2020-06-24 DIAGNOSIS — R10.31 RIGHT GROIN PAIN: ICD-10-CM

## 2020-06-24 DIAGNOSIS — M22.41 CHONDROMALACIA OF RIGHT PATELLA: ICD-10-CM

## 2020-06-24 DIAGNOSIS — M25.561 CHRONIC PAIN OF RIGHT KNEE: Primary | ICD-10-CM

## 2020-06-24 PROCEDURE — 97110 THERAPEUTIC EXERCISES: CPT

## 2020-06-24 NOTE — THERAPY TREATMENT NOTE
"    Outpatient Physical Therapy Ortho Treatment Note  [facility]     Patient Name: Roosevelt Ochoa  : 1990  MRN: [id number]  Today's Date: 2020      Visit Date: 2020   Attendance:  Subjective improvement: n/a  Recert: 20  MD Appointment: 20      Visit Dx:    ICD-10-CM ICD-9-CM   1. Chronic pain of right knee M25.561 719.46    G89.29 338.29   2. Chondromalacia of right patella M22.41 717.7   3. Right groin pain R10.31 789.03   4. Pain of right hip joint M25.551 719.45       Patient Active Problem List   Diagnosis   • Marijuana use   • BMI 31.0-31.9,adult   • Essential hypertension, benign   • History of retinal tear   • Gastroesophageal reflux disease without esophagitis   • Narcolepsy due to underlying condition without cataplexy        Past Medical History:   Diagnosis Date   • Asthma    • Blood chemistry abnormality    • Diarrhea    • Elevated blood pressure reading without diagnosis of hypertension    • Foot pain     probable tendonitis left foot      • Hip pain    • Hyperglycemia    • Hypertension    • Nausea and vomiting    • Sickle cell disease (CMS/HCC)    • Sleep apnea         Past Surgical History:   Procedure Laterality Date   • DENTAL PROCEDURE         PT Ortho     Row Name 20 1500       Subjective Comments    Subjective Comments  Pt states he is \"Clumbsy\" and has always fallen alot. Pt states he feels the insoles helped some things but caused more pain in other places. Pt does state that he didnt ease into them, instead wore them full time.  Pt c/o ankle >knee >Hip pain.  \"My hip doesnt feel like its in the right alignment.  -EM       Precautions and Contraindications    Precautions/Limitations  no known precautions/limitations  -EM       Posture/Observations    Posture/Observations Comments  Pt very flat footed, has arch collapse with gt. Crepitis noted anterior-lateral knee.  R SI joint anteriorly rotated causing impingement of R hip with flexion.   -EM " "     User Key  (r) = Recorded By, (t) = Taken By, (c) = Cosigned By    Initials Name Provider Type    Regis Ritchie PTA Physical Therapy Assistant                      PT Assessment/Plan     Row Name 06/24/20 1600          PT Assessment    Functional Limitations  Limitation in home management;Limitations in community activities;Limitations in functional capacity and performance;Performance in leisure activities;Performance in self-care ADL;Performance in sport activities  -EM     Impairments  Pain;Range of motion;Posture  -EM     Assessment Comments  Pt rasta tx well, painful catch in R hip with flexion resolved post tx. No goals met this tx.   -EM     Rehab Potential  Good  -EM     Patient/caregiver participated in establishment of treatment plan and goals  Yes  -EM     Patient would benefit from skilled therapy intervention  Yes  -EM        PT Plan    PT Frequency  2x/week  -EM     Predicted Duration of Therapy Intervention (Therapy Eval)  4-6 wks  -EM     PT Plan Comments  Recommend custom shoe orthotics.Cont strengthening of hip and ankle for stabilization as primary focus. Cont VMO Strengthening for improved patellar tracking.   -EM       User Key  (r) = Recorded By, (t) = Taken By, (c) = Cosigned By    Initials Name Provider Type    Regis Ritchie PTA Physical Therapy Assistant            OP Exercises     Row Name 06/24/20 1500             Subjective Comments    Subjective Comments  Pt states he is \"Clumbsy\" and has always fallen alot. Pt states he feels the insoles helped some things but caused more pain in other places. Pt does state that he didnt ease into them, instead wore them full time.  Pt c/o ankle >knee >Hip pain.  \"My hip doesnt feel like its in the right alignment.  -EM         Subjective Pain    Able to rate subjective pain?  yes  -EM      Pre-Treatment Pain Level  3  -EM      Post-Treatment Pain Level  2 \"It feels better and looser than I came in with\"  -EM         Exercise 1    Exercise " "Name 1  Incline Calf S  -EM      Sets 1  3  -EM      Time 1  30\"  -EM         Exercise 2    Exercise Name 2  Offstep CR: Neutral  -EM      Sets 2  1  -EM      Reps 2  20  -EM         Exercise 3    Exercise Name 3  St CR: ER, Soleus  -EM      Additional Comments  Pt unable to rasta IR  -EM         Exercise 4    Exercise Name 4  MS  -EM      Cueing 4  Verbal;Demo  -EM      Sets 4  1  -EM      Reps 4  30  -EM         Exercise 5    Exercise Name 5  Fwd/Lat Step Up-6\"  -EM      Sets 5  1  -EM      Reps 5  30  -EM         Exercise 6    Exercise Name 6  St 3 Way SLR  -EM      Sets 6  1  -EM      Reps 6  20  -EM         Exercise 7    Exercise Name 7  Longsitting VMO SLR  -EM      Sets 7  1  -EM      Reps 7  30  -EM         Exercise 8    Exercise Name 8  Seated Hip IR/ER w/ TB  -EM      Sets 8  1  -EM      Reps 8  20  -EM      Additional Comments  Red  -EM         Exercise 9    Exercise Name 9  ME technique to correct SI alignment.  -EM        User Key  (r) = Recorded By, (t) = Taken By, (c) = Cosigned By    Initials Name Provider Type    EM Regis Kelley, PTA Physical Therapy Assistant                       PT OP Goals     Row Name 06/24/20 1600          PT Short Term Goals    STG Date to Achieve  07/07/20  -EM     STG 1  Note a >/= 25% subjective improvement.  -EM     STG 1 Progress  Not Met  -EM     STG 2  LEFS score to be >/= 51/80.  -EM     STG 2 Progress  Not Met  -EM     STG 3  R ankle active DF to be >/= 7 deg when knee is extended.  -EM     STG 3 Progress  Not Met  -EM        Long Term Goals    LTG Date to Achieve  07/28/20  -EM     LTG 1  Independent with HEP/self-management.  -EM     LTG 1 Progress  Not Met  -EM     LTG 2  Minimal knee pain with squatting and stairs.  -EM     LTG 2 Progress  Not Met  -EM     LTG 3  LEFS score to be >/= 60/80.  -EM     LTG 3 Progress  Not Met  -EM     LTG 4  Resume PLOF.  -EM     LTG 4 Progress  Not Met  -EM       User Key  (r) = Recorded By, (t) = Taken By, (c) = Cosigned By    " Initials Name Provider Type    EM Regis Kelley PTA Physical Therapy Assistant                         Time Calculation:   Start Time: 1503  Stop Time: 1614  Time Calculation (min): 71 min  Total Timed Code Minutes- PT: 71 minute(s)  Therapy Charges for Today     Code Description Service Date Service Provider Modifiers Qty    89031747016 HC PT THER PROC EA 15 MIN 6/24/2020 Regis Kelley PTA GP 5                    Regis Kelley PTA  6/24/2020

## 2020-06-25 ENCOUNTER — APPOINTMENT (OUTPATIENT)
Dept: PHYSICAL THERAPY | Facility: HOSPITAL | Age: 30
End: 2020-06-25

## 2020-07-01 ENCOUNTER — HOSPITAL ENCOUNTER (OUTPATIENT)
Dept: PHYSICAL THERAPY | Facility: HOSPITAL | Age: 30
Setting detail: THERAPIES SERIES
Discharge: HOME | End: 2020-07-01

## 2020-07-01 DIAGNOSIS — M25.561 CHRONIC PAIN OF RIGHT KNEE: Primary | ICD-10-CM

## 2020-07-01 DIAGNOSIS — G89.29 CHRONIC PAIN OF RIGHT KNEE: Primary | ICD-10-CM

## 2020-07-01 DIAGNOSIS — M22.41 CHONDROMALACIA OF RIGHT PATELLA: ICD-10-CM

## 2020-07-01 PROCEDURE — 97110 THERAPEUTIC EXERCISES: CPT | Performed by: PHYSICAL THERAPIST

## 2020-07-01 NOTE — THERAPY TREATMENT NOTE
Outpatient Physical Therapy Ortho Treatment Note  [facility]     Patient Name: Roosevelt Ochoa  : 1990  MRN: [id number]  Today's Date: 2020      Visit Date: 2020  Attendance:3/5 (20v/year)  Subjective improvement: n/a  Recert: 20  MD Appointment: 20  Visit Dx:    ICD-10-CM ICD-9-CM   1. Chronic pain of right knee M25.561 719.46    G89.29 338.29   2. 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        Past Medical History:   Diagnosis Date   • Asthma    • Blood chemistry abnormality    • Diarrhea    • Elevated blood pressure reading without diagnosis of hypertension    • Foot pain     probable tendonitis left foot      • Hip pain    • Hyperglycemia    • Hypertension    • Nausea and vomiting    • Sickle cell disease (CMS/HCC)    • Sleep apnea         Past Surgical History:   Procedure Laterality Date   • DENTAL PROCEDURE         PT Ortho     Row Name 20 1600       Subjective Comments    Subjective Comments  Patient reports he is wearing inserts. Pain is primarily  in bilateral knees and ankles.   -SW       Subjective Pain    Able to rate subjective pain?  yes  -SW    Pre-Treatment Pain Level  3  -      User Key  (r) = Recorded By, (t) = Taken By, (c) = Cosigned By    Initials Name Provider Type    Rahel Cagle Physical Therapist                      PT Assessment/Plan     Row Name 20 1600          PT Assessment    Assessment Comments  Patient exhibits poor LE stabilization and endurance. Stabilizers fatigue easily. Suggested patient wear motion control shoes.   -SW        PT Plan    PT Frequency  2x/week  -SW     Predicted Duration of Therapy Intervention (Therapy Eval)  4-6 weeks  -SW     PT  "Plan Comments  Continue with focus on LE strabilization.   -       User Key  (r) = Recorded By, (t) = Taken By, (c) = Cosigned By    Initials Name Provider Type    Rahel Cagle Physical Therapist            OP Exercises     Row Name 07/01/20 1600             Subjective Comments    Subjective Comments  Patient reports he is wearing inserts. Pain is primarily  in bilateral knees and ankles.   -SW         Subjective Pain    Able to rate subjective pain?  yes  -      Pre-Treatment Pain Level  3  -SW         Exercise 1    Exercise Name 1  Pro II L4  -SW      Time 1  8'  -SW         Exercise 2    Exercise Name 2  calf stretch mama bear  -SW      Reps 2  30\"x2 knees straight, 30\"x2 kness bentt  -SW         Exercise 3    Exercise Name 3  marching heel toe airex  -SW      Reps 3  5 laps  -SW         Exercise 4    Exercise Name 4  553 airex B  -SW         Exercise 5    Exercise Name 5  rocker  -SW      Reps 5  20  -SW         Exercise 6    Exercise Name 6  golfers lift to tape table  -SW      Reps 6  10 ea side  -SW         Exercise 7    Exercise Name 7  sliders 4 way  -SW      Reps 7  10   -SW         Exercise 8    Exercise Name 8  side stepping and retro walking green tb ankles  -SW      Reps 8  2 laps  -SW      Additional Comments  next visit  -SW         Exercise 9    Exercise Name 9  arch raises  -SW      Reps 9  10\"x10  -SW        User Key  (r) = Recorded By, (t) = Taken By, (c) = Cosigned By    Initials Name Provider Type    Rahel Cagle Physical Therapist                       PT OP Goals     Row Name 07/01/20 1600          PT Short Term Goals    STG Date to Achieve  07/07/20  -     STG 1  Note a >/= 25% subjective improvement.  -     STG 1 Progress  Not Met;Progressing  -     STG 2  LEFS score to be >/= 51/80.  -     STG 2 Progress  Not Met;Progressing  -     STG 3  R ankle active DF to be >/= 7 deg when knee is extended.  -     STG 3 Progress  Not Met;Progressing  -        Long Term Goals "    LTG Date to Achieve  07/28/20  -     LTG 1  Independent with HEP/self-management.  -     LTG 1 Progress  Not Met;Progressing  -     LTG 2  Minimal knee pain with squatting and stairs.  -     LTG 2 Progress  Not Met;Progressing  -     LTG 3  LEFS score to be >/= 60/80.  -     LTG 3 Progress  Not Met;Progressing  -     LTG 4  Resume PLOF.  -     LTG 4 Progress  Not Met;Progressing  -        Time Calculation    PT Goal Re-Cert Due Date  07/07/20  -       User Key  (r) = Recorded By, (t) = Taken By, (c) = Cosigned By    Initials Name Provider Type    Rahel Cagle Physical Therapist          Therapy Education  Education Details: proper shoe wear  Given: HEP  How Provided: Verbal, Demonstration  Provided to: Patient              Time Calculation:   Start Time: 1608  Stop Time: 1650  Time Calculation (min): 42 min  Therapy Charges for Today     Code Description Service Date Service Provider Modifiers Qty    77107543782 HC PT THER PROC EA 15 MIN 7/1/2020 Rahel Barnes GP 3                    Rahel Barnes  7/1/2020

## 2020-07-02 DIAGNOSIS — G47.411 NARCOLEPSY WITH CATAPLEXY: ICD-10-CM

## 2020-07-02 RX ORDER — DEXTROAMPHETAMINE SACCHARATE, AMPHETAMINE ASPARTATE, DEXTROAMPHETAMINE SULFATE AND AMPHETAMINE SULFATE 5; 5; 5; 5 MG/1; MG/1; MG/1; MG/1
20 TABLET ORAL 2 TIMES DAILY
Qty: 60 TABLET | Refills: 0 | Status: SHIPPED | OUTPATIENT
Start: 2020-07-02 | End: 2020-08-01

## 2020-07-02 NOTE — PROGRESS NOTES
Request for refill of Adderall XR 20mg.    Chart reviewed.  PSG in Sept 18 w/ AHI <2, SOREM; next day with MSL <4 min with 2 SOREMs.  No Actigraphy or sleep logs noted in chart review.     SANGEETA reviewed, #[id number] ; unremarkable.  Scripts since March:  03/30/2020 Amphetamine/Dextroampheta 5MG/5MG/5MG/5MG/5MG/5MG/5  1990 60 30 Jaimie,[facility] [address] 2  03/30/2020 Armodafinil 150MG 1990 30 30 Jaimie,[facility] [address] 2  05/01/2020 Amphetamine/Dextroampheta 5MG/5MG/5MG/5MG/5MG/5MG/5  1990 60 30 Jaimie,[facility] [address] 2  05/01/2020 Armodafinil 150MG 1990 30 30 Jaimie,[facility] [address] 2  05/28/2020 Amphetamine/Dextroampheta 5MG/5MG/5MG/5MG  1990 60 30 Jaimie,[facility] [address] 2  05/28/2020 Armodafinil 150MG 1990 30 30 LaffoonAngelika [facility] [address] 2  06/27/2020 Amphetamine/Dextroampheta 5MG/5MG/5MG/5MG  1990 60 30 Jacob Bland Ii [facility] [address] 2    Has appt with me on 8/10/20.        Will bridge refill.      Jacob Bland II, MD  07/02/20 @ 1:53 PM      Additionally notified of XR script.  On chart review, recent office notes unclear but orders show Adderall XR and Adderall IR use.  Script yesterday for IR, needs refill on XR as well.  Will plan for XR 30mg qAM until seen in office for further clarification.      Jacob Bland II, MD  07/03/20 @ 7:33 AM

## 2020-07-03 RX ORDER — DEXTROAMPHETAMINE SACCHARATE, AMPHETAMINE ASPARTATE MONOHYDRATE, DEXTROAMPHETAMINE SULFATE AND AMPHETAMINE SULFATE 7.5; 7.5; 7.5; 7.5 MG/1; MG/1; MG/1; MG/1
30 CAPSULE, EXTENDED RELEASE ORAL DAILY
Qty: 30 CAPSULE | Refills: 0 | Status: SHIPPED | OUTPATIENT
Start: 2020-07-03 | End: 2020-08-10 | Stop reason: SDUPTHER

## 2020-07-06 ENCOUNTER — HOSPITAL ENCOUNTER (OUTPATIENT)
Dept: PHYSICAL THERAPY | Facility: HOSPITAL | Age: 30
Setting detail: THERAPIES SERIES
Discharge: HOME | End: 2020-07-06

## 2020-07-06 ENCOUNTER — APPOINTMENT (OUTPATIENT)
Dept: PHYSICAL THERAPY | Facility: HOSPITAL | Age: 30
End: 2020-07-06

## 2020-07-06 DIAGNOSIS — G89.29 CHRONIC PAIN OF RIGHT KNEE: Primary | ICD-10-CM

## 2020-07-06 DIAGNOSIS — M25.561 CHRONIC PAIN OF RIGHT KNEE: Primary | ICD-10-CM

## 2020-07-06 DIAGNOSIS — M22.41 CHONDROMALACIA OF RIGHT PATELLA: ICD-10-CM

## 2020-07-06 PROCEDURE — 97110 THERAPEUTIC EXERCISES: CPT

## 2020-07-06 NOTE — THERAPY TREATMENT NOTE
"    Outpatient Physical Therapy Ortho Treatment Note  [facility]     Patient Name: Roosevelt Ochoa  : 1990  MRN: [id number]  Today's Date: 2020      Visit Date: 2020   Attendance:   Subjective improvement: \"Better\"  Recert: 20  MD Appointment: 20      Visit Dx:    ICD-10-CM ICD-9-CM   1. Chronic pain of right knee M25.561 719.46    G89.29 338.29   2. 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        Past Medical History:   Diagnosis Date   • Asthma    • Blood chemistry abnormality    • Diarrhea    • Elevated blood pressure reading without diagnosis of hypertension    • Foot pain     probable tendonitis left foot      • Hip pain    • Hyperglycemia    • Hypertension    • Nausea and vomiting    • Sickle cell disease (CMS/HCC)    • Sleep apnea         Past Surgical History:   Procedure Laterality Date   • DENTAL PROCEDURE                         PT Assessment/Plan     Row Name 20 1500          PT Assessment    Functional Limitations  Limitation in home management;Limitations in community activities;Limitations in functional capacity and performance;Performance in leisure activities;Performance in self-care ADL;Performance in sport activities  -EM     Impairments  Pain;Range of motion;Posture  -EM     Assessment Comments  Pt rasta tx well with progressed therex regime to include resisted hip therex. Pt able to rasta standing CR this tx.   -EM        PT Plan    PT Frequency  2x/week  -EM     Predicted Duration of Therapy Intervention (Therapy Eval)  4-6 wks  -EM     PT Plan Comments  F/u on custom shoe orthotics.  -EM       User Key  (r) = Recorded By, (t) = Taken By, (c) = Cosigned By    Initials Name " "Provider Type    EM Regis Kelley, JACQUI Physical Therapy Assistant            OP Exercises     Row Name 07/06/20 1400             Subjective Comments    Subjective Comments  Pt reports he forgot his insoles today. But states he been wearing them full time.   -EM         Subjective Pain    Able to rate subjective pain?  yes  -EM      Pre-Treatment Pain Level  3  -EM         Exercise 1    Exercise Name 1  PRO II-Twn Peak-4.0  -EM      Time 1  10'  -EM         Exercise 2    Exercise Name 2  Incline Calf S  -EM      Sets 2  3  -EM      Time 2  30\"  -EM         Exercise 3    Exercise Name 3  St Ham S  -EM      Sets 3  3  -EM      Time 3  30\"  -EM         Exercise 4    Exercise Name 4  Walking Lunges  -EM      Sets 4  1  -EM      Reps 4  20  -EM      Additional Comments  R 1st toe pain with ext  -EM         Exercise 5    Exercise Name 5  Sidestepping w/ TB  -EM      Sets 5  4x60'  -EM      Additional Comments  Green  -EM         Exercise 6    Exercise Name 6  Monster Walk with TB  -EM      Sets 6  1  -EM      Reps 6  30  -EM      Additional Comments  Green  -EM         Exercise 7    Exercise Name 7  B 4 [address] St CR  -EM      Sets 7  1  -EM      Reps 7  20  -EM         Exercise 8    Exercise Name 8  B Fwd/Lat Step Up-6\"  -EM      Sets 8  1  -EM      Reps 8  20  -EM        User Key  (r) = Recorded By, (t) = Taken By, (c) = Cosigned By    Initials Name Provider Type    EM Regis Kelley, JACQUI Physical Therapy Assistant                       PT OP Goals     Row Name 07/06/20 1500          PT Short Term Goals    STG Date to Achieve  07/07/20  -EM     STG 1  Note a >/= 25% subjective improvement.  -EM     STG 1 Progress  Not Met;Progressing  -EM     STG 2  LEFS score to be >/= 51/80.  -EM     STG 2 Progress  Not Met;Progressing  -EM     STG 3  R ankle active DF to be >/= 7 deg when knee is extended.  -EM     STG 3 Progress  Not Met;Progressing  -EM        Long Term Goals    LTG Date to Achieve  07/28/20  -EM     LTG 1  Independent " with HEP/self-management.  -EM     LTG 1 Progress  Not Met;Progressing  -EM     LTG 2  Minimal knee pain with squatting and stairs.  -EM     LTG 2 Progress  Not Met;Progressing  -EM     LTG 3  LEFS score to be >/= 60/80.  -EM     LTG 3 Progress  Not Met;Progressing  -EM     LTG 4  Resume PLOF.  -EM     LTG 4 Progress  Not Met;Progressing  -EM        Time Calculation    PT Goal Re-Cert Due Date  07/07/20  -EM       User Key  (r) = Recorded By, (t) = Taken By, (c) = Cosigned By    Initials Name Provider Type    EM Regis Kelley PTA Physical Therapy Assistant                         Time Calculation:   Start Time: 1429  Stop Time: 1522  Time Calculation (min): 53 min  Total Timed Code Minutes- PT: 53 minute(s)  Therapy Charges for Today     Code Description Service Date Service Provider Modifiers Qty    02128036614 HC PT THER PROC EA 15 MIN 7/6/2020 Regis Kelley PTA GP 4                    Regis Kelley PTA  7/6/2020

## 2020-07-07 RX ORDER — PANTOPRAZOLE SODIUM 40 MG/1
TABLET, DELAYED RELEASE ORAL
Qty: 30 TABLET | Refills: 1 | Status: SHIPPED | OUTPATIENT
Start: 2020-07-07 | End: 2020-10-05 | Stop reason: SDUPTHER

## 2020-07-08 ENCOUNTER — HOSPITAL ENCOUNTER (OUTPATIENT)
Dept: PHYSICAL THERAPY | Facility: HOSPITAL | Age: 30
Setting detail: THERAPIES SERIES
Discharge: HOME | End: 2020-07-08

## 2020-07-08 ENCOUNTER — APPOINTMENT (OUTPATIENT)
Dept: PHYSICAL THERAPY | Facility: HOSPITAL | Age: 30
End: 2020-07-08

## 2020-07-08 DIAGNOSIS — G89.29 CHRONIC PAIN OF RIGHT KNEE: Primary | ICD-10-CM

## 2020-07-08 DIAGNOSIS — M25.561 CHRONIC PAIN OF RIGHT KNEE: Primary | ICD-10-CM

## 2020-07-08 DIAGNOSIS — M22.41 CHONDROMALACIA OF RIGHT PATELLA: ICD-10-CM

## 2020-07-08 PROCEDURE — 97110 THERAPEUTIC EXERCISES: CPT | Performed by: PHYSICAL THERAPIST

## 2020-07-08 NOTE — THERAPY TREATMENT NOTE
Outpatient Physical Therapy Ortho Progress Note  [facility]     Patient Name: Roosevelt Ochoa  : 1990  MRN: [id number]  Today's Date: 2020      Visit Date: 2020  Attendance:   Subjective improvement: 10%  Recert: 20  MD Appointment: 20  Visit Dx:    ICD-10-CM ICD-9-CM   1. Chronic pain of right knee M25.561 719.46    G89.29 338.29   2. 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        Past Medical History:   Diagnosis Date   • Asthma    • Blood chemistry abnormality    • Diarrhea    • Elevated blood pressure reading without diagnosis of hypertension    • Foot pain     probable tendonitis left foot      • Hip pain    • Hyperglycemia    • Hypertension    • Nausea and vomiting    • Sickle cell disease (CMS/HCC)    • Sleep apnea         Past Surgical History:   Procedure Laterality Date   • DENTAL PROCEDURE         PT Ortho     Row Name 20 1400       Posture/Observations    Posture/Observations Comments  moderate bilateral pes planus  -SW       Knee Special Tests    Anterior drawer (ACL lesion)  Negative;Right:  -SW    Lachman’s (ACL lesion)  Negative;Right:  -SW    Posterior drawer (PCL lesion)  Negative;Right:  -SW    Posterior sag sign (PCL lesion)  Negative;Right:  -SW    Valgus stress (MCL lesion)  Negative;Right:  -SW    Varus stress (LCL lesion)  Negative;Right:  -SW    Knee Special Tests Comments  crepitus and tenderness lateral right patella  -SW       Right Lower Ext    Rt Ankle Dorsiflexion AROM  DF knee extended 4, knee flexed 8  -SW    Row Name 20 1300       Subjective Comments    Subjective Comments  Patient reports 10% improvement in symptoms.   -SW       Subjective Pain    Able  to rate subjective pain?  yes  -SW    Pre-Treatment Pain Level  0  -SW    Post-Treatment Pain Level  0  -SW       MMT Right Lower Ext    Rt Hip Flexion MMT, Gross Movement  (5/5) normal  -SW    Rt Hip Extension MMT, Gross Movement  (5/5) normal  -SW    Rt Hip ABduction MMT, Gross Movement  (5/5) normal  -SW    Rt Hip ADduction MMT, Gross Movement  (5/5) normal  -SW    Rt Hip Internal (Medial) Rotation MMT, Gross Movement  (5/5) normal  -SW    Rt Hip External (Lateral) Rotation MMT, Gross Movement  (5/5) normal  -SW    Rt Knee Extension MMT, Gross Movement  (5/5) normal  -SW    Rt Knee Flexion MMT, Gross Movement  (5/5) normal  -SW    Rt Ankle Plantarflexion MMT, Gross Movement  (5/5) normal  -SW    Rt Ankle Dorsiflexion MMT, Gross Movement  (5/5) normal  -SW    Rt Ankle Subtalar Inversion MT, Gross Movement  (5/5) normal  -SW    Rt Ankle Subtalar Eversion MMT, Gross Movement  (5/5) normal  -SW      User Key  (r) = Recorded By, (t) = Taken By, (c) = Cosigned By    Initials Name Provider Type    Rahel Cagle Physical Therapist                      PT Assessment/Plan     Row Name 07/08/20 1400          PT Assessment    Functional Limitations  Limitation in home management;Limitations in community activities;Limitations in functional capacity and performance;Performance in leisure activities;Performance in self-care ADL;Performance in sport activities  -     Impairments  Pain;Range of motion;Posture  -SW     Assessment Comments  Patient exhibits moderate pes planus bilaterally and poor stabilization in bilateral LE's which contributes to stress and wear at lateral patella exacerbated by bsketball injury. Treatment will include orthotics and stabilization training.   -     Rehab Potential  Good  -SW     Patient/caregiver participated in establishment of treatment plan and goals  Yes  -SW     Patient would benefit from skilled therapy intervention  Yes  -SW        PT Plan    PT Frequency  2x/week  -      "Predicted Duration of Therapy Intervention (Therapy Eval)  4-6 weeks  -SW     Planned CPT's?  PT RE-EVAL: 36242;PT THER PROC EA 15 MIN: 02507;PT THER ACT EA 15 MIN: 63254;PT MANUAL THERAPY EA 15 MIN: 30139;PT NEUROMUSC RE-EDUCATION EA 15 MIN: 54873;PT SELF CARE/HOME MGMT/TRAIN EA 15: 89455;PT HOT OR COLD PACK TREAT MCARE  -SW     Physical Therapy Interventions (Optional Details)  balance training;manual therapy techniques;neuromuscular re-education;patient/family education;ROM (Range of Motion);strengthening;stretching  -SW     PT Plan Comments  Patient scheduled for orthotic fit next week. Continue with LE stabilization.   -SW       User Key  (r) = Recorded By, (t) = Taken By, (c) = Cosigned By    Initials Name Provider Type    Rahel Cagle Physical Therapist            OP Exercises     Row Name 07/08/20 1300             Subjective Comments    Subjective Comments  Patient reports 10% improvement in symptoms.   -SW         Subjective Pain    Able to rate subjective pain?  yes  -SW      Pre-Treatment Pain Level  0  -SW      Post-Treatment Pain Level  0  -SW         Exercise 1    Exercise Name 1  Pro II L5  -SW      Time 1  8'  -SW         Exercise 2    Exercise Name 2  calf stretch mama bear  -SW      Reps 2  30\"x2 knees straight, 30\"x2 kness bentt  -SW         Exercise 3    Exercise Name 3  marching heel toe airex  -SW      Reps 3  5 laps  -SW         Exercise 4    Exercise Name 4  553 airex B  -SW         Exercise 5    Exercise Name 5  rocker  -SW      Reps 5  20  -SW         Exercise 6    Exercise Name 6  golfers lift to tape table  -SW      Reps 6  10 ea side  -SW      Additional Comments  np  -SW         Exercise 7    Exercise Name 7  sliders 4 way  -SW      Reps 7  10   -SW         Exercise 8    Exercise Name 8  side stepping and retro walking green tb ankles  -SW      Reps 8  2 laps  -SW      Additional Comments  np  -SW         Exercise 9    Exercise Name 9  arch raises  -SW      Reps 9  10\"x10  -SW      "   User Key  (r) = Recorded By, (t) = Taken By, (c) = Cosigned By    Initials Name Provider Type    Rahel Cagle Physical Therapist                       PT OP Goals     Row Name 07/08/20 1300          PT Short Term Goals    STG Date to Achieve  07/07/20  -     STG 1  Note a >/= 25% subjective improvement.  -SW     STG 1 Progress  Met  -     STG 2  LEFS score to be >/= 51/80.  -     STG 2 Progress  Not Met;Progressing  -     STG 3  R ankle active DF to be >/= 7 deg when knee is extended.  -     STG 3 Progress  Not Met;Progressing  -        Long Term Goals    LTG Date to Achieve  07/28/20  -     LTG 1  Independent with HEP/self-management.  -     LTG 1 Progress  Not Met;Progressing  -     LTG 2  Minimal knee pain with squatting and stairs.  -     LTG 2 Progress  Not Met;Progressing  -     LTG 3  LEFS score to be >/= 60/80.  -     LTG 3 Progress  Not Met;Progressing  -     LTG 4  Resume PLOF.  -     LTG 4 Progress  Not Met;Progressing  -        Time Calculation    PT Goal Re-Cert Due Date  07/29/20  -       User Key  (r) = Recorded By, (t) = Taken By, (c) = Cosigned By    Initials Name Provider Type    Rahel Cagle Physical Therapist               Outcome Measure Options: Lower Extremity Functional Scale (LEFS)  Lower Extremity Functional Index  Any of your usual work, housework or school activities: Moderate difficulty  Your usual hobbies, recreational or sporting activities: Quite a bit of difficulty  Getting into or out of the bath: A little bit of difficulty  Walking between rooms: A little bit of difficulty  Putting on your shoes or socks: A little bit of difficulty  Squatting: A little bit of difficulty  Lifting an object, like a bag of groceries from the floor: A little bit of difficulty  Performing light activities around your home: A little bit of difficulty  Performing heavy activities around your home: Moderate difficulty  Getting into or out of a car: Moderate  difficulty  Walking 2 blocks: Quite a bit of difficulty  Walking a mile: Quite a bit of difficulty  Going up or down 10 stairs (about 1 flight of stairs): Moderate difficulty  Standing for 1 hour: Moderate difficulty  Sitting for 1 hour: Moderate difficulty  Running on even ground: Moderate difficulty  Running on uneven ground: Quite a bit of difficulty  Making sharp turns while running fast: Quite a bit of difficulty  Hopping: Quite a bit of difficulty  Rolling over in bed: A little bit of difficulty  Total: 41      Time Calculation:   Start Time: 1351  Stop Time: 1430  Time Calculation (min): 39 min  Therapy Charges for Today     Code Description Service Date Service Provider Modifiers Qty    22976920643 HC PT THER PROC EA 15 MIN 7/8/2020 Rahel Barnes GP 3          PT G-Codes  Outcome Measure Options: Lower Extremity Functional Scale (LEFS)  Total: 41         Rahel Barnes  7/8/2020

## 2020-07-13 ENCOUNTER — HOSPITAL ENCOUNTER (OUTPATIENT)
Dept: PHYSICAL THERAPY | Facility: HOSPITAL | Age: 30
Setting detail: THERAPIES SERIES
Discharge: HOME | End: 2020-07-13

## 2020-07-13 DIAGNOSIS — G89.29 CHRONIC PAIN OF RIGHT KNEE: Primary | ICD-10-CM

## 2020-07-13 DIAGNOSIS — M25.561 CHRONIC PAIN OF RIGHT KNEE: Primary | ICD-10-CM

## 2020-07-13 DIAGNOSIS — M22.41 CHONDROMALACIA OF RIGHT PATELLA: ICD-10-CM

## 2020-07-13 PROCEDURE — 97110 THERAPEUTIC EXERCISES: CPT | Performed by: PHYSICAL THERAPIST

## 2020-07-13 NOTE — THERAPY TREATMENT NOTE
Outpatient Physical Therapy Ortho Treatment Note  [facility]     Patient Name: Roosevelt Ochoa  : 1990  MRN: [id number]  Today's Date: 2020      Visit Date: 2020  Attendance:   Subjective improvement: 10%  Recert: 20  MD Appointment: 20  Visit Dx:    ICD-10-CM ICD-9-CM   1. Chronic pain of right knee M25.561 719.46    G89.29 338.29   2. 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        Past Medical History:   Diagnosis Date   • Asthma    • Blood chemistry abnormality    • Diarrhea    • Elevated blood pressure reading without diagnosis of hypertension    • Foot pain     probable tendonitis left foot      • Hip pain    • Hyperglycemia    • Hypertension    • Nausea and vomiting    • Sickle cell disease (CMS/HCC)    • Sleep apnea         Past Surgical History:   Procedure Laterality Date   • DENTAL PROCEDURE         PT Ortho     Row Name 20 1300       Subjective Comments    Subjective Comments  Patient set up podiatry appointment at [facility] for next week. He ran 2 miles yesterday. Towards the end of the first mile he started to have back pain and pain under big toe.   -SW       Subjective Pain    Able to rate subjective pain?  yes  -SW    Pre-Treatment Pain Level  0  -SW       Posture/Observations    Posture/Observations Comments  moderate bilateral pes planus  -SW      User Key  (r) = Recorded By, (t) = Taken By, (c) = Cosigned By    Initials Name Provider Type    Rahel Cagle Physical Therapist                      PT Assessment/Plan     Row Name 20 1300          PT Assessment    Assessment Comments  Patient exhibits poor LE stabilization and eccentric control when performing exercise  "regime.    -SW        PT Plan    PT Frequency  2x/week  -SW     Predicted Duration of Therapy Intervention (Therapy Eval)  4-6 weeks  -SW     PT Plan Comments  Continue with focus on LE stabilization at hips, knees, and ankles.   -       User Key  (r) = Recorded By, (t) = Taken By, (c) = Cosigned By    Initials Name Provider Type    Rahel Cagle Physical Therapist            OP Exercises     Row Name 07/13/20 1300             Subjective Comments    Subjective Comments  Patient set up podiatry appointment at [facility] for next week. He ran 2 miles yesterday. Towards the end of the first mile he started to have back pain and pain under big toe.   -SW         Subjective Pain    Able to rate subjective pain?  yes  -SW      Pre-Treatment Pain Level  0  -SW         Exercise 1    Exercise Name 1  Pro II L5  -SW      Time 1  5'  -SW         Exercise 2    Exercise Name 2  calf stretch mama bear  -SW      Reps 2  30\"x2 knees straight, 30\"x2 kness bentt  -SW      Additional Comments  not performed  -SW         Exercise 3    Exercise Name 3  marching heel toe airex  -SW      Reps 3  5 laps  -SW         Exercise 4    Exercise Name 4  553 airex B  -SW         Exercise 5    Exercise Name 5  rocker  -SW      Reps 5  20  -SW         Exercise 6    Exercise Name 6  golfers lift to tape table  -SW      Reps 6  10 ea side  -SW         Exercise 7    Exercise Name 7  sliders 4 way  -SW      Reps 7  10   -SW         Exercise 8    Exercise Name 8  side stepping and retro walking green tb ankles  -SW      Reps 8  2 laps  -SW      Additional Comments  not performed  -SW         Exercise 9    Exercise Name 9  arch raises  -SW      Reps 9  10\"x10  -SW         Exercise 10    Exercise Name 10  pulses 6\"  -SW      Reps 10  20 ea side  -SW         Exercise 11    Exercise Name 11  step downs 4\"  -SW      Reps 11  20 ea side  -SW        User Key  (r) = Recorded By, (t) = Taken By, (c) = Cosigned By    Initials Name Provider Type    ROB Barnes" Rahel Physical Therapist                       PT OP Goals     Row Name 07/13/20 1300          PT Short Term Goals    STG Date to Achieve  07/07/20  -     STG 1  Note a >/= 25% subjective improvement.  -     STG 1 Progress  Met  -     STG 2  LEFS score to be >/= 51/80.  -     STG 2 Progress  Not Met;Progressing  -     STG 3  R ankle active DF to be >/= 7 deg when knee is extended.  -     STG 3 Progress  Not Met;Progressing  -        Long Term Goals    LTG Date to Achieve  07/28/20  -     LTG 1  Independent with HEP/self-management.  -     LTG 1 Progress  Not Met;Progressing  -     LTG 2  Minimal knee pain with squatting and stairs.  -     LTG 2 Progress  Not Met;Progressing  -     LTG 3  LEFS score to be >/= 60/80.  -     LTG 3 Progress  Not Met;Progressing  -     LTG 4  Resume PLOF.  -     LTG 4 Progress  Not Met;Progressing  -        Time Calculation    PT Goal Re-Cert Due Date  07/29/20  -       User Key  (r) = Recorded By, (t) = Taken By, (c) = Cosigned By    Initials Name Provider Type    Rahel Cagle Physical Therapist                         Time Calculation:   Start Time: 1350  Stop Time: 1428  Time Calculation (min): 38 min  Therapy Charges for Today     Code Description Service Date Service Provider Modifiers Qty    92698262140 HC PT THER PROC EA 15 MIN 7/13/2020 Rahel Barnes GP 3                    Rahel Barnes  7/13/2020

## 2020-07-15 ENCOUNTER — HOSPITAL ENCOUNTER (OUTPATIENT)
Dept: PHYSICAL THERAPY | Facility: HOSPITAL | Age: 30
Setting detail: THERAPIES SERIES
Discharge: HOME | End: 2020-07-15

## 2020-07-15 DIAGNOSIS — G89.29 CHRONIC PAIN OF RIGHT KNEE: Primary | ICD-10-CM

## 2020-07-15 DIAGNOSIS — M21.40 PES PLANUS, UNSPECIFIED LATERALITY: ICD-10-CM

## 2020-07-15 DIAGNOSIS — M25.561 CHRONIC PAIN OF RIGHT KNEE: Primary | ICD-10-CM

## 2020-07-15 DIAGNOSIS — M22.41 CHONDROMALACIA OF RIGHT PATELLA: ICD-10-CM

## 2020-07-15 PROCEDURE — 97140 MANUAL THERAPY 1/> REGIONS: CPT | Performed by: PHYSICAL THERAPIST

## 2020-07-15 NOTE — THERAPY TREATMENT NOTE
"    Outpatient Physical Therapy Ortho Treatment Note  [facility]     Patient Name: Roosevelt Ochoa  : 1990  MRN: [id number]  Today's Date: 7/15/2020      Visit Date: 07/15/2020  Attendance: 7/10 (20/yr)  Subjective % Improvement: not noted today  Recert Date: 20  MD appointment: 20    Visit Dx:    ICD-10-CM ICD-9-CM   1. Chronic pain of right knee M25.561 719.46    G89.29 338.29   2. Chondromalacia of right patella M22.41 717.7   3. Pes planus, unspecified laterality M21.40 734       Patient Active Problem List   Diagnosis   • Marijuana use   • BMI 31.0-31.9,adult   • Essential hypertension, benign   • History of retinal tear   • Gastroesophageal reflux disease without esophagitis   • Narcolepsy due to underlying condition without cataplexy        Past Medical History:   Diagnosis Date   • Asthma    • Blood chemistry abnormality    • Diarrhea    • Elevated blood pressure reading without diagnosis of hypertension    • Foot pain     probable tendonitis left foot      • Hip pain    • Hyperglycemia    • Hypertension    • Nausea and vomiting    • Sickle cell disease (CMS/HCC)    • Sleep apnea         Past Surgical History:   Procedure Laterality Date   • DENTAL PROCEDURE         PT Ortho     Row Name 07/15/20 1500       Subjective Comments    Subjective Comments  Patient reports hurting of BLE. Notes both hips and knees hurt. Reports no history of custom orthotics in past.   -BB       Subjective Pain    Able to rate subjective pain?  yes  -BB    Pre-Treatment Pain Level  -- not noted level  -BB    Post-Treatment Pain Level  -- \"feels light\" \"little sore\"  -BB       Posture/Observations    Posture/Observations Comments  bilateral pes planus with overpronation. Bilateral calcaneal valgus noted with tone of bilateral gastroc seen. Increased navicular prominance seen bilaterally.   -BB      User Key  (r) = Recorded By, (t) = Taken By, (c) = Cosigned By    Initials Name Provider Type    BB Blue, " "Keri, PT DPT Physical Therapist                      PT Assessment/Plan     Row Name 07/15/20 1500          PT Assessment    Assessment Comments  Patient presents with bilateral pes planus with overpronation and could benefit from custom orthotics for midfoot motion control and support. Patient is also noted to have tone of iliospoas in a stretched position. Manual therapy performed today to assist with muscle/tissue mobility and educated patient to not perform LE work out today or tomorrow to monitor response to manual and to ice right hip. Patient also encouraged to ensure proper water intake to assist muscle cramps.   -BB        PT Plan    PT Frequency  2x/week  -BB     Predicted Duration of Therapy Intervention (Therapy Eval)  4-6 weeks  -BB     PT Plan Comments  monitor response to manual  -BB       User Key  (r) = Recorded By, (t) = Taken By, (c) = Cosigned By    Initials Name Provider Type    Keri Dozier, PT DPT Physical Therapist            OP Exercises     Row Name 07/15/20 1500             Subjective Comments    Subjective Comments  Patient reports hurting of BLE. Notes both hips and knees hurt. Reports no history of custom orthotics in past.   -BB         Subjective Pain    Able to rate subjective pain?  yes  -BB      Pre-Treatment Pain Level  -- not noted level  -BB      Post-Treatment Pain Level  -- \"feels light\" \"little sore\"  -BB         Exercise 1    Exercise Name 1  cast mold custom orthotics   -BB         Exercise 2    Exercise Name 2  HEP for hip IR and piriformis stretch   -BB      Sets 2  1  -BB      Time 2  30\" each   -BB         Exercise 3    Exercise Name 3  MFR to TrA and iliospoas with tone and tenderness felt R>L   -BB      Time 3  8'  -BB        User Key  (r) = Recorded By, (t) = Taken By, (c) = Cosigned By    Initials Name Provider Type    Keri Dozier, PT DPT Physical Therapist                       PT OP Goals     Row Name 07/15/20 1500          PT Short Term Goals    " STG Date to Achieve  07/07/20  -BB     STG 1  Note a >/= 25% subjective improvement.  -BB     STG 1 Progress  Met  -BB     STG 2  LEFS score to be >/= 51/80.  -BB     STG 2 Progress  Not Met;Progressing  -BB     STG 3  R ankle active DF to be >/= 7 deg when knee is extended.  -BB     STG 3 Progress  Not Met;Progressing  -BB        Long Term Goals    LTG Date to Achieve  07/28/20  -BB     LTG 1  Independent with HEP/self-management.  -BB     LTG 1 Progress  Not Met;Progressing  -BB     LTG 2  Minimal knee pain with squatting and stairs.  -BB     LTG 2 Progress  Not Met;Progressing  -BB     LTG 3  LEFS score to be >/= 60/80.  -BB     LTG 3 Progress  Not Met;Progressing  -BB     LTG 4  Resume PLOF.  -BB     LTG 4 Progress  Not Met;Progressing  -BB        Time Calculation    PT Goal Re-Cert Due Date  07/29/20  -BB       User Key  (r) = Recorded By, (t) = Taken By, (c) = Cosigned By    Initials Name Provider Type    Keri Dozier PT DPT Physical Therapist          Therapy Education  Education Details: orthotic wear schedule and skin inspection  Given: HEP  Program: New  How Provided: Verbal  Provided to: Patient  Level of Understanding: Verbalized              Time Calculation:   Start Time: 1438  Stop Time: 1515  Time Calculation (min): 37 min  Therapy Charges for Today     Code Description Service Date Service Provider Modifiers Qty    97789476590  PT-CUSTOM ORTHOTICS-LEVEL 2 7/15/2020 Keri Cooper PT DPT  1    29361219741 HC PT MANUAL THERAPY EA 15 MIN 7/15/2020 Keri Cooper PT DPT GP 1                    Keri Cooper PT DPT  7/15/2020

## 2020-07-20 ENCOUNTER — APPOINTMENT (OUTPATIENT)
Dept: PHYSICAL THERAPY | Facility: HOSPITAL | Age: 30
End: 2020-07-20

## 2020-07-22 ENCOUNTER — HOSPITAL ENCOUNTER (OUTPATIENT)
Dept: PHYSICAL THERAPY | Facility: HOSPITAL | Age: 30
Setting detail: THERAPIES SERIES
Discharge: HOME | End: 2020-07-22

## 2020-07-22 DIAGNOSIS — M25.561 CHRONIC PAIN OF RIGHT KNEE: Primary | ICD-10-CM

## 2020-07-22 DIAGNOSIS — G89.29 CHRONIC PAIN OF RIGHT KNEE: Primary | ICD-10-CM

## 2020-07-22 DIAGNOSIS — M22.41 CHONDROMALACIA OF RIGHT PATELLA: ICD-10-CM

## 2020-07-22 DIAGNOSIS — M21.40 PES PLANUS, UNSPECIFIED LATERALITY: ICD-10-CM

## 2020-07-22 PROCEDURE — 97110 THERAPEUTIC EXERCISES: CPT

## 2020-07-22 NOTE — THERAPY TREATMENT NOTE
Outpatient Physical Therapy Ortho Treatment Note  [facility]     Patient Name: Roosevelt Ochoa  : 1990  MRN: [id number]  Today's Date: 2020      Visit Date: 2020     Subjective Improvement: 50%  Attendance:  8/15 (20/yr)  Next MD Visit : 2020  Recert Date:  2020      Therapy Diagnosis:  1) R knee pain; 2) structural pes planus B; 3) possible R hip impingement        Visit Dx:    ICD-10-CM ICD-9-CM   1. Chronic pain of right knee M25.561 719.46    G89.29 338.29   2. Chondromalacia of right patella M22.41 717.7   3. Pes planus, unspecified laterality M21.40 734       Patient Active Problem List   Diagnosis   • Marijuana use   • BMI 31.0-31.9,adult   • Essential hypertension, benign   • History of retinal tear   • Gastroesophageal reflux disease without esophagitis   • Narcolepsy due to underlying condition without cataplexy        Past Medical History:   Diagnosis Date   • Asthma    • Blood chemistry abnormality    • Diarrhea    • Elevated blood pressure reading without diagnosis of hypertension    • Foot pain     probable tendonitis left foot      • Hip pain    • Hyperglycemia    • Hypertension    • Nausea and vomiting    • Sickle cell disease (CMS/HCC)    • Sleep apnea         Past Surgical History:   Procedure Laterality Date   • DENTAL PROCEDURE         PT Ortho     Row Name 20 1300       Precautions and Contraindications    Precautions/Limitations  no known precautions/limitations  -       Posture/Observations    Posture/Observations Comments  moderate bilateral pes planus  -      User Key  (r) = Recorded By, (t) = Taken By, (c) = Cosigned By    Initials Name Provider Type     Sera Mercado PTA Physical Therapy Assistant                      PT Assessment/Plan     Row Name 20 1300          PT Assessment    Assessment Comments  weakness in B ankles which really challenges balance; Otherwsie tolerated treatment well   -        PT Plan    PT Frequency   "2x/week  -KH     Predicted Duration of Therapy Intervention (Therapy Eval)  4-6 weeks  -KH     PT Plan Comments  Await orthotic arrival; Continue to increased strength in B ankles knees and hips   -KH       User Key  (r) = Recorded By, (t) = Taken By, (c) = Cosigned By    Initials Name Provider Type    Sera Galeas PTA Physical Therapy Assistant            OP Exercises     Row Name 07/22/20 1300             Subjective Comments    Subjective Comments  Pt reports that his pain is mostly in his groining and behind his right knee; really sore after last treat ment;   -KH         Subjective Pain    Able to rate subjective pain?  yes  -KH      Pre-Treatment Pain Level  2  -KH      Post-Treatment Pain Level  2  -KH         Exercise 1    Exercise Name 1  pro ll LE strength  -KH      Cueing 1  Verbal  -KH      Time 1  10'  -KH      Additional Comments  level 4; seat 10   -KH         Exercise 2    Exercise Name 2  incline stretch  -KH      Cueing 2  Verbal  -KH      Sets 2  3  -KH      Time 2  30\"  -KH         Exercise 3    Exercise Name 3  Shipshaper hover squat   -KH      Sets 3  3  -KH      Reps 3  15  -KH      Additional Comments  4# plyo ball with plyoback toss  -KH         Exercise 4    Exercise Name 4  Shipshaper lunge stance R/L Lead  -KH      Cueing 4  Verbal  -KH      Sets 4  2  -KH      Reps 4  10  -KH      Additional Comments  4# plyoball toss at plyoback  -KH         Exercise 5    Exercise Name 5  SLS with 553 on Rocker board  -KH      Reps 5  10 R/L  -KH         Exercise 6    Exercise Name 6  standing hip flexor stretch  -KH      Cueing 6  Verbal  -KH      Sets 6  2  -KH      Time 6  30\" B  -KH         Exercise 7    Exercise Name 7  BOSU step ups fwd  -KH      Cueing 7  Verbal  -KH      Sets 7  2  -KH      Reps 7  15  -KH        User Key  (r) = Recorded By, (t) = Taken By, (c) = Cosigned By    Initials Name Provider Type    Sera Galeas PTA Physical Therapy Assistant                       PT OP Goals     " Row Name 07/22/20 1300          PT Short Term Goals    STG Date to Achieve  07/07/20  -     STG 1  Note a >/= 25% subjective improvement.  -     STG 1 Progress  Met  -     STG 2  LEFS score to be >/= 51/80.  -     STG 2 Progress  Not Met;Progressing  -     STG 3  R ankle active DF to be >/= 7 deg when knee is extended.  -     STG 3 Progress  Not Met;Progressing  -        Long Term Goals    LTG Date to Achieve  07/28/20  -     LTG 1  Independent with HEP/self-management.  -     LTG 1 Progress  Not Met;Progressing  -     LTG 2  Minimal knee pain with squatting and stairs.  -     LTG 2 Progress  Not Met;Progressing  -     LTG 3  LEFS score to be >/= 60/80.  -     LTG 3 Progress  Not Met;Progressing  -     LTG 4  Resume PLOF.  -     LTG 4 Progress  Not Met;Progressing  -        Time Calculation    PT Goal Re-Cert Due Date  07/29/20  -       User Key  (r) = Recorded By, (t) = Taken By, (c) = Cosigned By    Initials Name Provider Type    Sera Galeas PTA Physical Therapy Assistant                         Time Calculation:   Start Time: 1307  Stop Time: 1346  Time Calculation (min): 39 min  Total Timed Code Minutes- PT: 39 minute(s)  Therapy Charges for Today     Code Description Service Date Service Provider Modifiers Qty    49826114646 HC PT THER PROC EA 15 MIN 7/22/2020 Sera Mercado PTA GP 3                    Sera Mercado PTA  7/22/2020

## 2020-07-29 ENCOUNTER — HOSPITAL ENCOUNTER (OUTPATIENT)
Dept: PHYSICAL THERAPY | Facility: HOSPITAL | Age: 30
Setting detail: THERAPIES SERIES
Discharge: HOME | End: 2020-07-29

## 2020-07-29 DIAGNOSIS — G89.29 CHRONIC PAIN OF RIGHT KNEE: Primary | ICD-10-CM

## 2020-07-29 DIAGNOSIS — M22.41 CHONDROMALACIA OF RIGHT PATELLA: ICD-10-CM

## 2020-07-29 DIAGNOSIS — M25.561 CHRONIC PAIN OF RIGHT KNEE: Primary | ICD-10-CM

## 2020-07-29 DIAGNOSIS — M21.40 PES PLANUS, UNSPECIFIED LATERALITY: ICD-10-CM

## 2020-07-29 PROCEDURE — 97110 THERAPEUTIC EXERCISES: CPT

## 2020-07-29 NOTE — THERAPY TREATMENT NOTE
Outpatient Physical Therapy Ortho Treatment Note  [facility]     Patient Name: Roosevelt Ochoa  : 1990  MRN: [id number]  Today's Date: 2020      Visit Date: 2020     Subjective Improvement 50%  Visits   Visits approved 20 per year  RTMD 2020 referring MD;  Recert Date 2020    Right Knee Pain; structural es planus; possible right hip impingement    Visit Dx:    ICD-10-CM ICD-9-CM   1. Chronic pain of right knee M25.561 719.46    G89.29 338.29   2. Chondromalacia of right patella M22.41 717.7   3. Pes planus, unspecified laterality M21.40 734       Patient Active Problem List   Diagnosis   • Marijuana use   • BMI 31.0-31.9,adult   • Essential hypertension, benign   • History of retinal tear   • Gastroesophageal reflux disease without esophagitis   • Narcolepsy due to underlying condition without cataplexy        Past Medical History:   Diagnosis Date   • Asthma    • Blood chemistry abnormality    • Diarrhea    • Elevated blood pressure reading without diagnosis of hypertension    • Foot pain     probable tendonitis left foot      • Hip pain    • Hyperglycemia    • Hypertension    • Nausea and vomiting    • Sickle cell disease (CMS/[facility])    • Sleep apnea         Past Surgical History:   Procedure Laterality Date   • DENTAL PROCEDURE                         PT Assessment/Plan     Row Name 20 1611          PT Assessment    Assessment Comments  patient demo decrease right ankle stabilbity with SL stance activities.  He does have an appointment with Dr. Torres concerning his right foot and ankle pain.  -CP        PT Plan    PT Frequency  2x/week  -CP     Predicted Duration of Therapy Intervention (Therapy Eval)  4-6 weeks  -CP     PT Plan Comments  Cont with POC.  push/pull cart  -CP       User Key  (r) = Recorded By, (t) = Taken By, (c) = Cosigned By    Initials Name Provider Type    Patt Sparrow, PTA Physical Therapy Assistant            OP Exercises     Row  Name 07/29/20 1500             Subjective Comments    Subjective Comments  Patient reprots increase knee pain today.  His pain was a 7/10 after walking to the grocery store.  At present, pain is 5/10.  He reports increased swelling the right knee.   -CP         Subjective Pain    Able to rate subjective pain?  yes  -CP      Pre-Treatment Pain Level  5  -CP      Post-Treatment Pain Level  5  -CP         Exercise 1    Exercise Name 1  Pro II level 4.3  -CP      Time 1  10  -CP         Exercise 2    Exercise Name 2  incline stretch  -CP      Cueing 2  Verbal  -CP      Sets 2  3  -CP      Time 2  30  -CP         Exercise 3    Exercise Name 3  scott hover squat rebounder  -CP      Sets 3  3  -CP      Reps 3  15  -CP      Additional Comments  4 lb plyoball  -CP         Exercise 4    Exercise Name 4  ship shaper lunge stance  -CP      Cueing 4  Verbal  -CP      Sets 4  3  -CP      Reps 4  15  -CP      Additional Comments  4 lb plyoball rebounder  -CP         Exercise 5    Exercise Name 5  rebonder SL stance bal toss  -CP      Cueing 5  Verbal;Demo  -CP      Sets 5  3  -CP      Reps 5  10  -CP      Time 5  4 lb ball  -CP         Exercise 6    Exercise Name 6  SL forward lean  -CP      Cueing 6  Verbal;Demo  -CP      Sets 6  3  -CP      Reps 6  10  -CP      Additional Comments  bilateral  -CP         Exercise 7    Exercise Name 7  BOSU step up  -CP      Cueing 7  Verbal;Demo  -CP      Sets 7  2  -CP      Reps 7  15  -CP      Time 7  bilateral  -CP         Exercise 8    Exercise Name 8  prone quad and hip fl stretch  -CP      Cueing 8  Verbal;Tactile  -CP      Sets 8  3  -CP      Time 8  30  -CP      Additional Comments  bilateral with rolled towels under thigh  -CP         Exercise 9    Exercise Name 9  seated hip ER/IR with tband  -CP      Cueing 9  Verbal;Tactile  -CP      Sets 9  2  -CP      Reps 9  10  -CP      Time 9  blue tband  -CP      Additional Comments  bilateral  -CP         Exercise 10    Exercise Name 10   R ankld DF, Inv and Ev  -CP      Cueing 10  Verbal;Tactile  -CP      Sets 10  2  -CP      Reps 10  10  -CP      Time 10  green tband  -CP        User Key  (r) = Recorded By, (t) = Taken By, (c) = Cosigned By    Initials Name Provider Type    CP Patt Jenkins PTA Physical Therapy Assistant                       PT OP Goals     Row Name 07/29/20 1600          PT Short Term Goals    STG Date to Achieve  07/07/20  -CP     STG 1  Note a >/= 25% subjective improvement.  -CP     STG 1 Progress  Met  -CP     STG 2  LEFS score to be >/= 51/80.  -CP     STG 2 Progress  Not Met;Progressing  -CP     STG 3  R ankle active DF to be >/= 7 deg when knee is extended.  -CP     STG 3 Progress  Not Met;Progressing  -CP        Long Term Goals    LTG Date to Achieve  07/28/20  -CP     LTG 1  Independent with HEP/self-management.  -CP     LTG 1 Progress  Not Met;Progressing  -CP     LTG 2  Minimal knee pain with squatting and stairs.  -CP     LTG 2 Progress  Not Met;Progressing  -CP     LTG 3  LEFS score to be >/= 60/80.  -CP     LTG 3 Progress  Not Met;Progressing  -CP     LTG 4  Resume PLOF.  -CP     LTG 4 Progress  Not Met;Progressing  -CP        Time Calculation    PT Goal Re-Cert Due Date  07/25/20  -CP       User Key  (r) = Recorded By, (t) = Taken By, (c) = Cosigned By    Initials Name Provider Type    CP Patt Jenkins PTA Physical Therapy Assistant          Therapy Education  Education Details: SL forward lean, seated hip IR/ER with Tband. right ankle DF, INV,EV with tband  Given: HEP  Program: New  How Provided: Verbal  Provided to: Patient  Level of Understanding: Teach back education performed, Demonstrated              Time Calculation:   Start Time: 1515  Stop Time: 1610  Time Calculation (min): 55 min  Total Timed Code Minutes- PT: 55 minute(s)  Therapy Charges for Today     Code Description Service Date Service Provider Modifiers Qty    71981261621 HC PT THER PROC EA 15 MIN 7/29/2020 Patt Jenkins PTA GP 4                     Patt Jenkins, PTA  7/29/2020

## 2020-07-31 ENCOUNTER — APPOINTMENT (OUTPATIENT)
Dept: PHYSICAL THERAPY | Facility: HOSPITAL | Age: 30
End: 2020-07-31

## 2020-08-05 ENCOUNTER — APPOINTMENT (OUTPATIENT)
Dept: PHYSICAL THERAPY | Facility: HOSPITAL | Age: 30
End: 2020-08-05

## 2020-08-07 ENCOUNTER — APPOINTMENT (OUTPATIENT)
Dept: PHYSICAL THERAPY | Facility: HOSPITAL | Age: 30
End: 2020-08-07

## 2020-08-10 ENCOUNTER — OFFICE VISIT (OUTPATIENT)
Dept: SLEEP MEDICINE | Facility: HOSPITAL | Age: 30
End: 2020-08-10

## 2020-08-10 VITALS
HEART RATE: 92 BPM | SYSTOLIC BLOOD PRESSURE: 148 MMHG | OXYGEN SATURATION: 98 % | WEIGHT: 210 LBS | HEIGHT: 70 IN | DIASTOLIC BLOOD PRESSURE: 86 MMHG | BODY MASS INDEX: 30.06 KG/M2

## 2020-08-10 DIAGNOSIS — G47.10 HYPERSOMNIA: ICD-10-CM

## 2020-08-10 DIAGNOSIS — G47.411 NARCOLEPSY WITH CATAPLEXY: Primary | ICD-10-CM

## 2020-08-10 PROCEDURE — 99214 OFFICE O/P EST MOD 30 MIN: CPT | Performed by: PSYCHIATRY & NEUROLOGY

## 2020-08-10 RX ORDER — ARMODAFINIL 150 MG/1
150 TABLET ORAL EVERY MORNING
Qty: 30 TABLET | Refills: 0 | Status: SHIPPED | OUTPATIENT
Start: 2020-08-10 | End: 2020-08-11 | Stop reason: SDUPTHER

## 2020-08-10 RX ORDER — DEXTROAMPHETAMINE SACCHARATE, AMPHETAMINE ASPARTATE MONOHYDRATE, DEXTROAMPHETAMINE SULFATE AND AMPHETAMINE SULFATE 7.5; 7.5; 7.5; 7.5 MG/1; MG/1; MG/1; MG/1
30 CAPSULE, EXTENDED RELEASE ORAL DAILY
Qty: 30 CAPSULE | Refills: 0 | Status: SHIPPED | OUTPATIENT
Start: 2020-08-10 | End: 2020-08-11 | Stop reason: SDUPTHER

## 2020-08-11 ENCOUNTER — APPOINTMENT (OUTPATIENT)
Dept: PHYSICAL THERAPY | Facility: HOSPITAL | Age: 30
End: 2020-08-11

## 2020-08-11 DIAGNOSIS — G47.411 NARCOLEPSY WITH CATAPLEXY: ICD-10-CM

## 2020-08-11 RX ORDER — DEXTROAMPHETAMINE SACCHARATE, AMPHETAMINE ASPARTATE MONOHYDRATE, DEXTROAMPHETAMINE SULFATE AND AMPHETAMINE SULFATE 7.5; 7.5; 7.5; 7.5 MG/1; MG/1; MG/1; MG/1
30 CAPSULE, EXTENDED RELEASE ORAL DAILY
Qty: 30 CAPSULE | Refills: 0 | Status: SHIPPED | OUTPATIENT
Start: 2020-08-11

## 2020-08-11 RX ORDER — ARMODAFINIL 150 MG/1
150 TABLET ORAL EVERY MORNING
Qty: 30 TABLET | Refills: 0 | Status: SHIPPED | OUTPATIENT
Start: 2020-08-11

## 2020-08-11 NOTE — PROGRESS NOTES
Resent to [facility].           [facility] pharmacy called and said patients script was sent to the long term care and they can not transfer it because its a narcotic. They need you to resend it to just the [facility]. Thanks          Jacob Bland II, MD  08/11/20 @ 11:04 AM

## 2020-08-11 NOTE — PROGRESS NOTES
Sleep Medicine Follow-Up Note    CC & ID:  Mr. Roosevelt Ochoa is a 30 y.o. male seen for follow-up regarding narcolepsy spectrum diagnosis.      Treatment Summary:   -Seen Dr. Etienne for narcolepsy with cataplexy.  Currently on Xyrem 2.25 g twice nightly, Adderall XR 30 mg daily, modafinil 100 mg daily.  --Diagnostic PSG in September 2018 with AHI of 1.4.  Sleep latency of 2 minutes.  Onset to REM was a minute.  AHI of 1.4.  SPO2 jorge of 90%.  -Next day M SLT, September 2018: 2 naps were performed, with mean sleep latency of 3 minutes 15 seconds.  Sleep onset REM achieved on both naps.      HPI:   Today, patient presents with his significant other.  Information from both.    Patient reports ongoing excessive daytime sleepiness.  The quality is severe, severity is impairing, duration is ongoing and timing is through the day.  Modifying factors are improvement from his medications.  He reports some frustration with latency for refills.  He also reports interest in higher dose medication given his ongoing sleepiness.  Some days with more sleepiness than others.  Variable naps but improve with medication.  Bedtime midnight, wake time 7.    Concerning cataplexy, he reports dropping items.  States that this will happen at times when he is laughing at other times through the day.  At times hypnagogic and hypnopompic hallucinations.    No other medical changes.      ROS:   -ENT: Nasal Obstruction: none significant  -CONSTITUTIONAL: Weight: decreasing  Wt Readings from Last 5 Encounters:   08/10/20 95.3 kg (210 lb)   06/10/20 98.9 kg (218 lb)   12/03/19 100 kg (221 lb)   09/13/19 93.7 kg (206 lb 8 oz)   05/29/19 106 kg (232 lb 9.6 oz)       Sleep Pattern:  -Bedtime: Midnight  -Lights Out: Same  -Environment: No lights/TV  -Latency: Minutes  -Awakenings: Variable  -Wake Time: 7 AM, typically without issue  -Rise Time: Same  -Patient's estimate of Sleep Time: Most of night  -Total Clock Time Spent in Bed: 7  "hours      Daytime Symptoms:  -Daytime fatigue/sleepiness: Sleepiness  -Naps: At times, variable  -Involuntary Dozing: Yes  -Cognitive Symptoms: Diminished  -Driving: Difficulty with sleepiness and driving: Some   -- Close calls related to sleepiness: Denied   -- Accidents related to sleepiness: Denies      Questionnaires: No ESS today      Patient Active Problem List   Diagnosis   • Marijuana use   • BMI 31.0-31.9,adult   • Essential hypertension, benign   • History of retinal tear   • Gastroesophageal reflux disease without esophagitis   • Narcolepsy due to underlying condition without cataplexy       CMHx:  --> Denies any significant medical changes since last visit.   --> Supplemental Oxygen Use: denies      Current Outpatient Medications   Medication Sig Dispense Refill   • amLODIPine (NORVASC) 10 MG tablet Take 1 tablet by mouth Daily. 90 tablet 3   • amphetamine-dextroamphetamine XR (ADDERALL XR) 30 MG 24 hr capsule Take 1 capsule by mouth Daily For narcolepsy. 30 capsule 0   • armodafinil (NUVIGIL) 150 MG tablet Take 1 tablet by mouth Every Morning. 30 tablet 0   • chlorthalidone (HYGROTON) 25 MG tablet Take 1 tablet by mouth Daily. 90 tablet 3   • pantoprazole (PROTONIX) 40 MG EC tablet TAKE ONE TABLET BY MOUTH DAILY AS NEEDED FOR REFLUX 30 tablet 1   • Sodium Oxybate (XYREM PO) Take 60 mL by mouth 2 (two) times a day.       No current facility-administered medications for this visit.      -Notable Current Medications:  --> Adderall XR in the morning, Nuvigil shortly thereafter; Xyrem nightly twice through night      PE:  Body mass index is 30.13 kg/m².  Vitals:    08/10/20 1549   BP: 148/86   Pulse: 92   SpO2: 98%   Weight: 95.3 kg (210 lb)   Height: 177.8 cm (70\")     Wt Readings from Last 5 Encounters:   08/10/20 95.3 kg (210 lb)   06/10/20 98.9 kg (218 lb)   12/03/19 100 kg (221 lb)   09/13/19 93.7 kg (206 lb 8 oz)   05/29/19 106 kg (232 lb 9.6 oz)       General:  In NAD.  Head: Atraumatic  Eyes: " EOMI.  CV: No Clubbing.   Pul: Respirations: unlaboured.    MS: No atrophy.  Neuro: No resting tremor.  Gait normal turning & station; unremarkable overall.  Psych: Socially appropriate.  Pleasant.  No overt dysphoria.         Assessment & Planning:  Mr. Roosevelt Ochoa is a 30 y.o. male who is seen for follow-up of:     --Narcolepsy spectrum versus hypersomnia spectrum: Suboptimally controlled  --Worsening symptoms he reports with interest and an increase in dosage  - Given there is no longer actigraphy, other etiologies need to be evaluated, particularly with complaints of worsening excessive daytime sleepiness.  We will plan to repeat testing with actigraphy and sleep logs.  To be off medicine for at least 3 days prior to testing, as possible.  - Seep extension, with goal of at least 8 hours.  - Scheduled naps as needed  - Continue:   --Adderall XR 30 mg daily    --Armodafinil 150 mg daily    --Xyrem 2.25 mg nightly until testing.      --Refill for Adderall and armodafinil.  -[facility] reviewed, #[id number]; standard prescriptions for armodafinil and dextroamphetamine/amphetamine from Dr. Etienne.  Last Xyrem was in November 2019.    -->  Safe driving reviewed.    -->  Follow-up after testing; sooner, if needed.    --> Call with any questions or concerns.    All questions answered for the patient and his significant other, who indicated understanding and agreed with the plan.       Jacob Bland MD  08/10/2020   Sleep Medicine    CC: Jhonathan Nielsen MD

## 2020-08-12 ENCOUNTER — HOSPITAL ENCOUNTER (OUTPATIENT)
Dept: PHYSICAL THERAPY | Facility: HOSPITAL | Age: 30
Setting detail: THERAPIES SERIES
Discharge: HOME | End: 2020-08-12

## 2020-08-12 DIAGNOSIS — M25.561 CHRONIC PAIN OF RIGHT KNEE: Primary | ICD-10-CM

## 2020-08-12 DIAGNOSIS — M22.41 CHONDROMALACIA OF RIGHT PATELLA: ICD-10-CM

## 2020-08-12 DIAGNOSIS — G89.29 CHRONIC PAIN OF RIGHT KNEE: Primary | ICD-10-CM

## 2020-08-12 PROCEDURE — 97140 MANUAL THERAPY 1/> REGIONS: CPT

## 2020-08-12 PROCEDURE — 97110 THERAPEUTIC EXERCISES: CPT

## 2020-08-12 NOTE — THERAPY RE-EVALUATION
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 Chronic pain of right knee M25.561 719.46    G89.29 338.29   2. Chondromalacia of right patella M22.41 717.7             PT Ortho     Row Name 20 1600       Subjective Comments    Subjective Comments  Pt reports that he has  pain in R big toe, and knee today that has been aggravating him all day. He notices some increased tightness in posterior knee and anterior hip.   -AC       Subjective Pain    Able to rate subjective pain?  yes  -AC    Pre-Treatment Pain Level  2  -AC    Post-Treatment Pain Level  2  -AC    Subjective Pain Comment  feels looser at end of treatment.   -AC       Posture/Observations    Posture/Observations Comments  continues to show pes planus.   -AC       Right Lower Ext    Rt Knee Extension/Flexion AROM  8- knee extended; 30 degrees knee flexed   -AC    RT Lower  Extremity Comments  1/2 kneeling DF painful in medial malleolar region, tactile block at malleolus reduces pain.   -AC      User Key  (r) = Recorded By, (t) = Taken By, (c) = Cosigned By    Initials Name Provider Type    Maru Angelo PT Physical Therapist                            PT OP Goals     Row Name 08/12/20 1600          PT Short Term Goals    STG Date to Achieve  07/07/20  -AC     STG 1  Note a >/= 25% subjective improvement.  -AC     STG 1 Progress  Met  -AC     STG 2  LEFS score to be >/= 51/80.  -AC     STG 2 Progress  Met  -AC     STG 3  R ankle active DF to be >/= 7 deg when knee is extended.  -AC     STG 3 Progress  Met  -AC        Long Term Goals    LTG Date to Achieve  07/28/20  -AC     LTG 1  Independent with HEP/self-management.  -AC     LTG 1 Progress  Ongoing  -AC     LTG 2  Minimal knee pain with squatting and stairs.  -AC     LTG 2 Progress  Not Met;Progressing  -AC     LTG 2 Progress Comments  continues to have pain with stairs and squatting. However notes it is greater in the ankles now than knees.   -AC     LTG 3  LEFS score to be >/= 60/80.  -AC     LTG 3 Progress  Not Met;Progressing  -AC     LTG 3 Progress Comments  54/50  -AC     LTG 4  Resume PLOF.  -AC     LTG 4 Progress  Not Met;Progressing  -AC        Time Calculation    PT Goal Re-Cert Due Date  09/02/20  -       User Key  (r) = Recorded By, (t) = Taken By, (c) = Cosigned By    Initials Name Provider Type    Maru Angelo PT Physical Therapist          PT Assessment/Plan     Row Name 08/12/20 1700          PT Assessment    Functional Limitations  Performance in leisure activities;Performance in sport activities;Impaired locomotion  -AC     Impairments  Impaired muscle length;Joint mobility;Pain;Range of motion  -AC     Assessment Comments  Pt continues to demonstrate poor ankle stability and increased tightness throughout the posterior chain. Re-evaluation noted improvements in ankle DF ROM, LEFS score, and  subjective improvement which meet multiple short term goals at this time. He continues to have pain in the R knee and ankle and stability deficits in the right ankle. Pt is given ongoing education regarding muscle tightness and stretching. Educated patient today on the benefit of foam rolling for his LE tightness and he was understanding and agreeable to try at home in the future. He responded well to manual soft tissue mobilization of the lateral quad insertion and psoas muscle. 1/2 kneeling DF ROM revealed improved ROM to 30 degrees, however the pt has pinching in the ankle. When tactile blocking was used at the medial malleolus the pain diminished. Continued treatment of  posterior chain flexibility, ankle joint mobility, and stability at the ankle and knee to reduce pain and improve pain and function.   -AC     Please refer to paper survey for additional self-reported information  Yes  -AC     Rehab Potential  Good  -AC     Patient/caregiver participated in establishment of treatment plan and goals  Yes  -AC     Patient would benefit from skilled therapy intervention  Yes  -AC        PT Plan    PT Frequency  2x/week  -AC     Predicted Duration of Therapy Intervention (Therapy Eval)  4-6 weeks.   -AC     PT Plan Comments  Address Posterior chain tightness with manual techniques as needed, may benefit from PNF stretching is tolerated, and stretching, check joint mobility at the ankle to address pinching with joint mobs, strength and stability training at the ankle and knee. Awaiting orthotics still, not in as of this date.   -AC       User Key  (r) = Recorded By, (t) = Taken By, (c) = Cosigned By    Initials Name Provider Type    Maru Angelo, PT Physical Therapist            OP Exercises     Row Name 08/12/20 1600             Subjective Comments    Subjective Comments  Pt reports that he has  pain in R big toe, and knee today that has been aggravating him all day. He notices some increased tightness in  posterior knee and anterior hip.   -AC         Subjective Pain    Able to rate subjective pain?  yes  -AC      Pre-Treatment Pain Level  2  -AC      Post-Treatment Pain Level  2  -AC      Subjective Pain Comment  feels looser at end of treatment.   -AC         Exercise 1    Exercise Name 1  Pro II- L5  -AC      Time 1  10 minutes  -AC         Exercise 2    Exercise Name 2  incline stretch   -AC      Sets 2  3  -AC      Time 2  30s  -AC         Exercise 3    Exercise Name 3  Standing HS stretch   -AC      Sets 3  3  -AC      Time 3  30s  -AC         Exercise 4    Exercise Name 4  BOSU squats (upside down)   -AC      Sets 4  2  -AC      Reps 4  15  -AC         Exercise 5    Exercise Name 5  re-eval   -AC      Additional Comments  LEFS, ankle DF, goals   -AC         Exercise 6    Exercise Name 6  see manual   -AC      Additional Comments  Psoas and lat HS.   -AC         Exercise 7    Exercise Name 7  ankle DF ROM- 1/2 kneeling   -AC      Sets 7  1  -AC      Reps 7  20   -AC      Additional Comments  With tactile block at medial ankle to reduce pain  -AC        User Key  (r) = Recorded By, (t) = Taken By, (c) = Cosigned By    Initials Name Provider Type    Maru Angelo, MASTER Physical Therapist           Manual Rx (last 36 hours)      Manual Treatments     Row Name 08/12/20 1700             Manual Rx 1    Manual Rx 1 Location  Psoas/ posterior knee- HS insertion   -AC      Manual Rx 1 Type  STM/ friction massage  -AC      Manual Rx 1 Duration  10  -AC        User Key  (r) = Recorded By, (t) = Taken By, (c) = Cosigned By    Initials Name Provider Type    Maru Angelo, MASTER Physical Therapist                      Outcome Measure Options: Lower Extremity Functional Scale (LEFS)  Lower Extremity Functional Index  Any of your usual work, housework or school activities: A little bit of difficulty  Your usual hobbies, recreational or sporting activities: Quite a bit of difficulty  Getting into or out of the bath:  A little bit of difficulty  Walking between rooms: No difficulty  Putting on your shoes or socks: No difficulty  Squatting: Moderate difficulty  Lifting an object, like a bag of groceries from the floor: A little bit of difficulty  Performing light activities around your home: A little bit of difficulty  Performing heavy activities around your home: A little bit of difficulty  Getting into or out of a car: A little bit of difficulty  Walking 2 blocks: Moderate difficulty  Walking a mile: Quite a bit of difficulty  Going up or down 10 stairs (about 1 flight of stairs): A little bit of difficulty  Standing for 1 hour: Moderate difficulty  Sitting for 1 hour: A little bit of difficulty  Running on even ground: Moderate difficulty  Running on uneven ground: Moderate difficulty  Making sharp turns while running fast: A little bit of difficulty  Hopping: A little bit of difficulty  Rolling over in bed: No difficulty  Total: 54      Time Calculation:     Start Time: 1606  Stop Time: 1646  Time Calculation (min): 40 min     Therapy Charges for Today     Code Description Service Date Service Provider Modifiers Qty    21096360802 HC PT THER PROC EA 15 MIN 8/12/2020 Maru Bean, PT GP 2    [id number]  PT MANUAL THERAPY EA 15 MIN 8/12/2020 Maru Bean, PT GP 1                   Maru Bean, PT  8/12/2020

## 2020-08-13 ENCOUNTER — APPOINTMENT (OUTPATIENT)
Dept: PHYSICAL THERAPY | Facility: HOSPITAL | Age: 30
End: 2020-08-13

## 2020-08-18 ENCOUNTER — HOSPITAL ENCOUNTER (OUTPATIENT)
Dept: PHYSICAL THERAPY | Facility: HOSPITAL | Age: 30
Setting detail: THERAPIES SERIES
Discharge: HOME | End: 2020-08-18

## 2020-08-18 DIAGNOSIS — M25.561 CHRONIC PAIN OF RIGHT KNEE: Primary | ICD-10-CM

## 2020-08-18 DIAGNOSIS — G89.29 CHRONIC PAIN OF RIGHT KNEE: Primary | ICD-10-CM

## 2020-08-18 DIAGNOSIS — M22.41 CHONDROMALACIA OF RIGHT PATELLA: ICD-10-CM

## 2020-08-18 DIAGNOSIS — M21.40 PES PLANUS, UNSPECIFIED LATERALITY: ICD-10-CM

## 2020-08-18 PROCEDURE — 97535 SELF CARE MNGMENT TRAINING: CPT

## 2020-08-18 NOTE — THERAPY DISCHARGE NOTE
Outpatient Physical Therapy Ortho Treatment Note/Discharge Summary  [facility]     Patient Name: Roosevelt Ochoa  : 1990  MRN: [id number]  Today's Date: 2020      Visit Date: 2020     ATTENDANCE:   SUBJECTIVE IMPROVEMENT: 30%  NEXT MD APPOINTMENT: 2020  RECERT DATE: 2020     THERAPY DIAGNOSIS: R knee pain, pes planus, possible R hip impingement, R ankle ROM deficits     Visit Dx:    ICD-10-CM ICD-9-CM   1. Chronic pain of right knee M25.561 719.46    G89.29 338.29   2. Chondromalacia of right patella M22.41 717.7   3. Pes planus, unspecified laterality M21.40 734       Patient Active Problem List   Diagnosis   • Marijuana use   • BMI 31.0-31.9,adult   • Essential hypertension, benign   • History of retinal tear   • Gastroesophageal reflux disease without esophagitis   • Narcolepsy due to underlying condition without cataplexy        Past Medical History:   Diagnosis Date   • Asthma    • Blood chemistry abnormality    • Diarrhea    • Elevated blood pressure reading without diagnosis of hypertension    • Foot pain     probable tendonitis left foot      • Hip pain    • Hyperglycemia    • Hypertension    • Nausea and vomiting    • Sickle cell disease (CMS/HCC)    • Sleep apnea         Past Surgical History:   Procedure Laterality Date   • DENTAL PROCEDURE         PT Ortho     Row Name 20 1600       Subjective Comments    Subjective Comments  Been doing exercises at home and feels that he is good to go since he has orthotics now.   -RU       Precautions and Contraindications    Precautions/Limitations  no known precautions/limitations  -      User Key  (r) = Recorded By, (t) = Taken By, (c) = Cosigned By    Initials Name Provider Type    Sera Galeas PTA Physical Therapy Assistant                      PT Assessment/Plan     Row Name 20 1600          PT Assessment    Assessment Comments  good fit with orthotics; independent with HEP at this time and feels he  can continue on his own at this time;   -        PT Plan    PT Frequency  2x/week  -     Predicted Duration of Therapy Intervention (Therapy Eval)  d/c this date  -     PT Plan Comments  pt independent with exercises and good fit with orthotics; D/C at this time to independent program and HEP   -       User Key  (r) = Recorded By, (t) = Taken By, (c) = Cosigned By    Initials Name Provider Type    Sera Galeas PTA Physical Therapy Assistant              OP Exercises     Row Name 08/18/20 1600             Subjective Comments    Subjective Comments  Been doing exercises at home and feels that he is good to go since he has orthotics now.   -         Subjective Pain    Able to rate subjective pain?  yes  -      Pre-Treatment Pain Level  0  -      Post-Treatment Pain Level  0  -         Exercise 1    Exercise Name 1  orthotic check out and review self care and HEP   -      Time 1  11'  -        User Key  (r) = Recorded By, (t) = Taken By, (c) = Cosigned By    Initials Name Provider Type    Sera Galeas, JACQUI Physical Therapy Assistant                         PT OP Goals     Row Name 08/18/20 1600          PT Short Term Goals    STG Date to Achieve  07/07/20  -     STG 1  Note a >/= 25% subjective improvement.  -     STG 1 Progress  Met  -     STG 2  LEFS score to be >/= 51/80.  -     STG 2 Progress  Met  -     STG 3  R ankle active DF to be >/= 7 deg when knee is extended.  -     STG 3 Progress  Met  -        Long Term Goals    LTG Date to Achieve  07/28/20  -     LTG 1  Independent with HEP/self-management.  -     LTG 1 Progress  Met  -     LTG 2  Minimal knee pain with squatting and stairs.  -     LTG 2 Progress  Met  -     LTG 3  LEFS score to be >/= 60/80.  -     LTG 3 Progress  Progressing  -     LTG 4  Resume PLOF.  -     LTG 4 Progress  Met  -        Time Calculation    PT Goal Re-Cert Due Date  09/02/20  -       User Key  (r) = Recorded By, (t) = Taken By,  (c) = Cosigned By    Initials Name Provider Type    Sera Galeas PTA Physical Therapy Assistant                         Time Calculation:   Start Time: 1615  Stop Time: 1626  Time Calculation (min): 11 min  Therapy Charges for Today     Code Description Service Date Service Provider Modifiers Qty    90093763402 HC PT SELF CARE/MGMT/TRAIN EA 15 MIN 8/18/2020 Sera Mercado PTA GP 1                OP PT Discharge Summary  Date of Discharge: 08/18/20  Reason for Discharge: All goals achieved, Maximum functional potential achieved, Independent  Outcomes Achieved: Able to achieve all goals within established timeline, Patient able to partially acheive established goals  Discharge Destination: Home with home program      Sera Mercado PTA  8/18/2020

## 2020-08-19 ENCOUNTER — OFFICE VISIT (OUTPATIENT)
Dept: PODIATRY | Facility: CLINIC | Age: 30
End: 2020-08-19

## 2020-08-19 VITALS — OXYGEN SATURATION: 98 % | WEIGHT: 210 LBS | HEIGHT: 70 IN | HEART RATE: 82 BPM | BODY MASS INDEX: 30.06 KG/M2

## 2020-08-19 DIAGNOSIS — M21.42 PES PLANUS OF BOTH FEET: Primary | ICD-10-CM

## 2020-08-19 DIAGNOSIS — M21.41 PES PLANUS OF BOTH FEET: Primary | ICD-10-CM

## 2020-08-19 DIAGNOSIS — M21.279: ICD-10-CM

## 2020-08-19 PROCEDURE — 99203 OFFICE O/P NEW LOW 30 MIN: CPT | Performed by: PODIATRIST

## 2020-08-19 NOTE — PROGRESS NOTES
Roosevelt Ochoa  1990  30 y.o. male    Patient presents to clinic today with a complaint of bilateral ankle pain and right hallux pain.     08/19/2020    Chief Complaint   Patient presents with   • Right Ankle - Pain   • Left Ankle - Pain   • Right Foot - Pain, Hallux pain       History of Present Illness    Roosevelt Ochoa is a 30 y.o.male who presents to clinic today with chief complaint of ankle pain and right hallux pain.  Rates pain as a 3 out of 10.  Pain is been present for greater than 1 year.  Pain right great toe limits his ability to exercise.  There are no associated injuries or trauma.  He has recently obtained custom orthotics and is in the break-in phase.  He has done nothing else to treat his foot pain.  He has no other complaints    Past Medical History:   Diagnosis Date   • Asthma    • Blood chemistry abnormality    • Diarrhea    • Elevated blood pressure reading without diagnosis of hypertension    • Foot pain     probable tendonitis left foot      • Hip pain    • Hyperglycemia    • Hypertension    • Nausea and vomiting    • Sickle cell disease (CMS/HCC)    • Sleep apnea          Past Surgical History:   Procedure Laterality Date   • DENTAL PROCEDURE     • EYE SURGERY           Family History   Problem Relation Age of Onset   • Hypertension Mother    • Heart disease Other    • Diabetes Other        Allergies   Allergen Reactions   • Lisinopril Anaphylaxis       Social History     Socioeconomic History   • Marital status: Single     Spouse name: Not on file   • Number of children: Not on file   • Years of education: Not on file   • Highest education level: Not on file   Tobacco Use   • Smoking status: Former Smoker   • Smokeless tobacco: Never Used   Substance and Sexual Activity   • Alcohol use: Yes     Alcohol/week: 24.0 standard drinks     Types: 24 Shots of liquor per week     Comment: Patient states he drinks approximately 24 oz per week   • Drug use: No     Types: Marijuana   •  "Sexual activity: Yes         Current Outpatient Medications   Medication Sig Dispense Refill   • amLODIPine (NORVASC) 10 MG tablet Take 1 tablet by mouth Daily. 90 tablet 3   • amphetamine-dextroamphetamine XR (ADDERALL XR) 30 MG 24 hr capsule Take 1 capsule by mouth Daily For narcolepsy. 30 capsule 0   • armodafinil (NUVIGIL) 150 MG tablet Take 1 tablet by mouth Every Morning. 30 tablet 0   • chlorthalidone (HYGROTON) 25 MG tablet Take 1 tablet by mouth Daily. 90 tablet 3   • pantoprazole (PROTONIX) 40 MG EC tablet TAKE ONE TABLET BY MOUTH DAILY AS NEEDED FOR REFLUX 30 tablet 1   • Sodium Oxybate (XYREM PO) Take 60 mL by mouth 2 (two) times a day.       No current facility-administered medications for this visit.        Review of Systems   Constitutional: Negative.    HENT: Negative.    Eyes: Negative.    Respiratory: Negative.    Cardiovascular: Negative.    Gastrointestinal: Negative.    Endocrine: Negative.    Genitourinary: Negative.    Musculoskeletal:        Foot pain  Ankle pain   Skin: Negative.    Allergic/Immunologic: Negative.    Neurological: Negative.    Hematological: Negative.    Psychiatric/Behavioral: Negative.          OBJECTIVE    Pulse 82   Ht 177.8 cm (70\")   Wt 95.3 kg (210 lb)   SpO2 98%   BMI 30.13 kg/m²       Physical Exam   Constitutional: He is oriented to person, place, and time. He appears well-developed and well-nourished. No distress.   HENT:   Head: Normocephalic and atraumatic.   Nose: Nose normal.   Eyes: Pupils are equal, round, and reactive to light. Conjunctivae and EOM are normal.   Pulmonary/Chest: Effort normal. No respiratory distress. He has no wheezes.   Musculoskeletal: Normal range of motion. He exhibits deformity. He exhibits no edema.   Neurological: He is alert and oriented to person, place, and time.   Skin: Skin is warm and dry. Capillary refill takes less than 2 seconds.   Psychiatric: He has a normal mood and affect. His behavior is normal. Thought content " normal.   Vitals reviewed.      Gait: normal     Assistive Device: none     Lower Extremity    Cardiovascular:    DP/PT pulses palpable bilateral  CFT brisk  to all digits  Skin temp is warm to warm from proximal tibia to distal digits bilateral  Pedal hair growth present.   No erythema or edema noted   Musculoskeletal:  Muscle strength is 5/5 for all muscle groups tested   ROM of the 1st MTP is decreased bilateral   ROM of the MTJ is WNL bilateral   ROM of the STJ is WNL bilateral   ROM of the ankle joint is  WNL bilateral   Pes planus bilateral   Dermatological:   Skin is warm, dry and intact bilateral  Webspaces 1-4 bilateral are clean, dry and intact.   No subcutaneous nodules or masses noted    Nails 1-5 bilateral are within normal limits for length and thickness    Neurological:   Protective sensation intact   Sensation intact to light touch        Procedures        ASSESSMENT AND PLAN    Roosevelt was seen today for pain, pain, pain and hallux pain.    Diagnoses and all orders for this visit:    Pes planus of both feet  -     XR Foot 3+ View Bilateral    Dorsiflexed first ray, unspecified laterality      - Comprehensive foot and ankle exam performed.   -Radiographs taken and reviewed.  No acute osseous or articular normality's.  -Discussion held patient regarding treatment options going forward.  Recommended patient continue to use custom orthotics.  Briefly discussed surgical correction of dorsiflexed first ray and flatfoot if needed in the future.  - All questions were answered to the patients satisfaction.  - RTC as needed          This document has been electronically signed by Brian Leyva DPM on August 19, 2020 12:39     8/19/2020  12:39

## 2020-08-20 ENCOUNTER — APPOINTMENT (OUTPATIENT)
Dept: PHYSICAL THERAPY | Facility: HOSPITAL | Age: 30
End: 2020-08-20

## 2020-08-24 ENCOUNTER — APPOINTMENT (OUTPATIENT)
Dept: PHYSICAL THERAPY | Facility: HOSPITAL | Age: 30
End: 2020-08-24

## 2020-08-24 DIAGNOSIS — M25.561 ACUTE PAIN OF RIGHT KNEE: Primary | ICD-10-CM

## 2020-08-26 ENCOUNTER — APPOINTMENT (OUTPATIENT)
Dept: PHYSICAL THERAPY | Facility: HOSPITAL | Age: 30
End: 2020-08-26

## 2020-09-08 ENCOUNTER — TELEPHONE (OUTPATIENT)
Dept: SLEEP MEDICINE | Facility: HOSPITAL | Age: 30
End: 2020-09-08

## 2020-09-08 NOTE — TELEPHONE ENCOUNTER
Called on Sept 4,2020 and spoke with lidia in regards to scheduling [facility].  Lidia stated that they were not satisfied with the care and were going to go elsewhere to establish care

## 2020-09-10 ENCOUNTER — OFFICE VISIT (OUTPATIENT)
Dept: ORTHOPEDIC SURGERY | Facility: CLINIC | Age: 30
End: 2020-09-10

## 2020-09-10 VITALS — WEIGHT: 219.1 LBS | HEIGHT: 70 IN | BODY MASS INDEX: 31.37 KG/M2

## 2020-09-10 DIAGNOSIS — M23.91 INTERNAL DERANGEMENT OF RIGHT KNEE: Primary | ICD-10-CM
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
## 2020-09-10 DIAGNOSIS — M25.561 ACUTE PAIN OF RIGHT KNEE: ICD-10-CM

## 2020-09-10 PROCEDURE — 99213 OFFICE O/P EST LOW 20 MIN: CPT | Performed by: ORTHOPAEDIC SURGERY

## 2020-09-10 NOTE — PROGRESS NOTES
Roosevelt Ochoa is a 30 y.o. male   Primary provider:  Jhonathan Nielsen MD       Chief Complaint   Patient presents with   • Right Knee - Pain   • Establish Care       HISTORY OF PRESENT ILLNESS: Patient being seen for right knee pain due to injury occurring on 12/25/2019. X-rays done today.   Injury in feb/march playing basketball.  Was a twisting type injury.  Has worked with PT over the last several months without any improvement.  Continues to have pain with activity.  Dull aches all the time.  Swelling with increased activity  Pain along the lateral aspect and around the patella.  Not taking NSAIDS.  No numbness or tingling.  Pain with pivot and twist.  He reports some giving away at times.      Pain   This is a new problem. The current episode started more than 1 month ago (12/25/2019). The problem occurs constantly. Associated symptoms comments: Grinding, aching, swelling. The symptoms are aggravated by walking.        CONCURRENT MEDICAL HISTORY:    Past Medical History:   Diagnosis Date   • Asthma    • Blood chemistry abnormality    • Diarrhea    • Elevated blood pressure reading without diagnosis of hypertension    • Foot pain     probable tendonitis left foot      • Hip pain    • Hyperglycemia    • Hypertension    • Nausea and vomiting    • Sickle cell disease (CMS/HCC)    • Sleep apnea        Allergies   Allergen Reactions   • Lisinopril Anaphylaxis         Current Outpatient Medications:   •  amLODIPine (NORVASC) 10 MG tablet, Take 1 tablet by mouth Daily., Disp: 90 tablet, Rfl: 3  •  amphetamine-dextroamphetamine XR (ADDERALL XR) 30 MG 24 hr capsule, Take 1 capsule by mouth Daily For narcolepsy., Disp: 30 capsule, Rfl: 0  •  armodafinil (NUVIGIL) 150 MG tablet, Take 1 tablet by mouth Every Morning., Disp: 30 tablet, Rfl: 0  •  chlorthalidone (HYGROTON) 25 MG tablet, Take 1 tablet by mouth Daily., Disp: 90 tablet, Rfl: 3  •  pantoprazole (PROTONIX) 40 MG EC tablet, TAKE ONE TABLET BY MOUTH DAILY AS  "NEEDED FOR REFLUX, Disp: 30 tablet, Rfl: 1  •  Sodium Oxybate (XYREM PO), Take 60 mL by mouth 2 (two) times a day., Disp: , Rfl:     Past Surgical History:   Procedure Laterality Date   • DENTAL PROCEDURE     • EYE SURGERY         Family History   Problem Relation Age of Onset   • Hypertension Mother    • Heart disease Other    • Diabetes Other         Social History     Socioeconomic History   • Marital status: Single     Spouse name: Not on file   • Number of children: Not on file   • Years of education: Not on file   • Highest education level: Not on file   Tobacco Use   • Smoking status: Former Smoker   • Smokeless tobacco: Never Used   Substance and Sexual Activity   • Alcohol use: Not Currently     Alcohol/week: 24.0 standard drinks     Types: 24 Shots of liquor per week     Comment: Patient states he drinks approximately 24 oz per week   • Drug use: No     Types: Marijuana   • Sexual activity: Yes        Review of Systems   Constitutional: Negative.    HENT: Negative.    Eyes: Negative.    Respiratory: Negative.    Cardiovascular: Negative.    Gastrointestinal: Negative.    Endocrine: Negative.    Genitourinary: Negative.    Musculoskeletal:        Right knee pain   Skin: Negative.    Allergic/Immunologic: Negative.    Neurological: Negative.    Hematological: Negative.    Psychiatric/Behavioral: Negative.        PHYSICAL EXAMINATION:       Ht 177.8 cm (70\")   Wt 99.4 kg (219 lb 1.6 oz)   BMI 31.44 kg/m²     Physical Exam   Constitutional: He is oriented to person, place, and time. He appears well-developed and well-nourished.   Neurological: He is alert and oriented to person, place, and time.   Psychiatric: He has a normal mood and affect. His behavior is normal. Judgment and thought content normal.       GAIT:     [x]  Normal  []  Antalgic    Assistive device: [x]  None  []  Walker     []  Crutches  []  Cane     []  Wheelchair  []  Stretcher    Right Knee Exam     Muscle Strength   The patient has normal " right knee strength.    Tenderness   The patient is experiencing tenderness in the medial joint line, lateral joint line, lateral retinaculum and LCL.    Range of Motion   Extension: 0   Flexion: 120     Tests   Varus: positive (mild tenderness with varus stress but no instability) Valgus: negative    Other   Erythema: absent  Sensation: normal  Pulse: present  Swelling: none      Left Knee Exam     Muscle Strength   The patient has normal left knee strength.    Tenderness   The patient is experiencing no tenderness.     Tests   Varus: negative Valgus: negative  Drawer:  Anterior - negative         Other   Erythema: absent  Sensation: normal  Pulse: present  Swelling: none              Xr Knee 1 Or 2 View Right    Result Date: 9/10/2020  Narrative: Ordering Provider:  Arpit Brand MD Ordering Diagnosis/Indication:  Acute pain of right knee Procedure:  XR KNEE 1 OR 2 VW RIGHT Exam Date:  9/10/20 COMPARISON:  Not applicable, no relevant images available.     Impression:  AP standing of the knees with lateral of the right knee shows acceptable position alignment with no evidence of acute bony abnormality.  No fracture or dislocation is noted.  No patellofemoral joint arthritic change noted. Arpit Brand MD 9/10/20     Xr Knee Bilateral Ap Standing    Result Date: 9/10/2020  Narrative: Ordering Provider:  Arpit Brand MD Ordering Diagnosis/Indication:  Acute pain of right knee Procedure:  XR KNEE BILATERAL AP STANDING Exam Date:  9/10/20 COMPARISON:  Not applicable, no relevant images available.     Impression:  AP standing of the knees with lateral of the right knee shows acceptable position alignment with no evidence of acute bony abnormality.  No fracture or dislocation is noted.  No patellofemoral joint arthritic change noted. Arpit Brand MD 9/10/20     Xr Foot 3+ View Bilateral    Result Date: 8/24/2020  Narrative: PROCEDURE: 3 views of the right left foot COMPARISON: No  comparison HISTORY: Bilateral foot pain FINDINGS: 3 weightbearing views of the right and left foot reviewed.  No fracture or dislocation.  No osseous erosion or suspicious osseous lesion.  Normal bone mineral density.  No increased soft tissue density.  Spurring to the dorsal talus on the right foot.  Posterior calcaneal spurring noted to the right.  Pes planus deformity bilateral.     Impression:  Negative for acute osseous or articular abnormality.            ASSESSMENT:    Diagnoses and all orders for this visit:    Internal derangement of right knee  -     MRI Knee Right Without Contrast; Future    Acute pain of right knee  -     MRI Knee Right Without Contrast; Future    Injury while playing basketball          PLAN    Over 8 months of pain.  He has mechanical symptoms.  He has pain along the joint line.  Exam findings consistent with meniscal tear.  He is tried activity modification, anti-inflammatory medication, rest, ice, without significant improvement.  Plan is to proceed with MRI to assess for potential surgical intervention.    Patient's Body mass index is 31.44 kg/m². BMI is above normal parameters. Recommendations include: exercise counseling and nutrition counseling.      Return for recheck for MRI results.    Arpit Brand MD

## 2020-09-18 ENCOUNTER — HOSPITAL ENCOUNTER (OUTPATIENT)
Dept: MRI IMAGING | Facility: HOSPITAL | Age: 30
Discharge: HOME | End: 2020-09-18
Admitting: ORTHOPAEDIC SURGERY

## 2020-09-18 DIAGNOSIS — M23.91 INTERNAL DERANGEMENT OF RIGHT KNEE: ICD-10-CM

## 2020-09-18 DIAGNOSIS — M25.561 ACUTE PAIN OF RIGHT KNEE: ICD-10-CM

## 2020-09-18 PROCEDURE — 73721 MRI JNT OF LWR EXTRE W/O DYE: CPT

## 2020-10-01 ENCOUNTER — OFFICE VISIT (OUTPATIENT)
Dept: ORTHOPEDIC SURGERY | Facility: CLINIC | Age: 30
End: 2020-10-01

## 2020-10-01 VITALS — WEIGHT: 220 LBS | HEIGHT: 70 IN | BODY MASS INDEX: 31.5 KG/M2

## 2020-10-01 DIAGNOSIS — M23.91 INTERNAL DERANGEMENT OF RIGHT KNEE: ICD-10-CM

## 2020-10-01 DIAGNOSIS — M25.561 ACUTE PAIN OF RIGHT KNEE: Primary | ICD-10-CM
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
## 2020-10-01 PROCEDURE — 99213 OFFICE O/P EST LOW 20 MIN: CPT | Performed by: ORTHOPAEDIC SURGERY

## 2020-10-01 NOTE — PROGRESS NOTES
"Roosevelt Ochoa is a 30 y.o. male returns for     Chief Complaint   Patient presents with   • Right Knee - Follow-up, Pain   • Results       HISTORY OF PRESENT ILLNESS:  F/u right knee pain. Mri done on 9/18/2020  Pain is slowly improving.  He is able to walk better and perform his daily activities better.  No fevers or chills.  No numbness or tingling.     CONCURRENT MEDICAL HISTORY:    The following portions of the patient's history were reviewed and updated as appropriate: allergies, current medications, past family history, past medical history, past social history, past surgical history and problem list.     ROS  No fevers or chills.  No chest pain or shortness of air.  No GI or  disturbances.    PHYSICAL EXAMINATION:       Ht 177.8 cm (70\")   Wt 99.8 kg (220 lb)   BMI 31.57 kg/m²     Physical Exam  Constitutional:       General: He is not in acute distress.     Appearance: He is well-developed. He is not ill-appearing.   Pulmonary:      Effort: Pulmonary effort is normal. No respiratory distress.   Musculoskeletal:      Right knee: He exhibits no effusion.   Neurological:      Mental Status: He is alert and oriented to person, place, and time.   Psychiatric:         Mood and Affect: Mood normal.         Behavior: Behavior normal.         Thought Content: Thought content normal.         Judgment: Judgment normal.         GAIT:     [x]  Normal  []  Antalgic    Assistive device: [x]  None  []  Walker     []  Crutches  []  Cane     []  Wheelchair  []  Stretcher    Right Knee Exam     Muscle Strength   The patient has normal right knee strength.    Tenderness   The patient is experiencing no tenderness.     Range of Motion   The patient has normal right knee ROM.    Tests   Varus: negative Valgus: negative  Drawer:  Anterior - negative        Other   Sensation: normal  Pulse: present  Swelling: none  Effusion: no effusion present              Xr Knee 1 Or 2 View Right    Result Date: 9/10/2020  Narrative: " Ordering Provider:  Arpit Brand MD Ordering Diagnosis/Indication:  Acute pain of right knee Procedure:  XR KNEE 1 OR 2 VW RIGHT Exam Date:  9/10/20 COMPARISON:  Not applicable, no relevant images available.     Impression:  AP standing of the knees with lateral of the right knee shows acceptable position alignment with no evidence of acute bony abnormality.  No fracture or dislocation is noted.  No patellofemoral joint arthritic change noted. Arpit Brand MD 9/10/20     Xr Knee Bilateral Ap Standing    Result Date: 9/10/2020  Narrative: Ordering Provider:  Arpit Brand MD Ordering Diagnosis/Indication:  Acute pain of right knee Procedure:  XR KNEE BILATERAL AP STANDING Exam Date:  9/10/20 COMPARISON:  Not applicable, no relevant images available.     Impression:  AP standing of the knees with lateral of the right knee shows acceptable position alignment with no evidence of acute bony abnormality.  No fracture or dislocation is noted.  No patellofemoral joint arthritic change noted. Arpit Brand MD 9/10/20     Mri Knee Right Without Contrast    Result Date: 9/18/2020  Narrative: MRI of the right knee without contrast HISTORY: Right knee pain. Pain since basketball injury last November. Multisequence multiplanar images of the right knee were obtained without contrast. COMPARISON: None. Correlation with radiographs of September 10, 2020. FINDINGS: 1.6 cm focal bone bruise dorsal central aspect of the medial femoral condyle. Small joint effusion. Question partial tear anterior cruciate ligament. The posterior cruciate ligament, collateral ligaments, quadriceps tendon and patellar tendon are intact. No meniscal tear.     Impression: CONCLUSION: 1.6 cm focal bone bruise dorsal central aspect of the medial femoral condyle. Small joint effusion. Question partial tear anterior cruciate ligament. [id number] Electronically signed by:  Roosevelt Kelley MD  9/18/2020 7:35 PM CDT Workstation:  [id number]            ASSESSMENT:    Diagnoses and all orders for this visit:    Acute pain of right knee    Internal derangement of right knee    Injury while playing basketball          PLAN    He is going to continue with general strengthening and conditioning exercises.    Slowly increase activity as pain allows.    We discussed a dedicated.  Over the next 4 to 6 weeks where he was working on a low impact conditioning exercises rather than returning to full activity immediately.    Follow-up as needed if not improving.    Patient's Body mass index is 31.57 kg/m². BMI is above normal parameters. Recommendations include: exercise counseling and nutrition counseling.    Return if symptoms worsen or fail to improve, for recheck.    Arpit Brand MD

## 2020-10-05 RX ORDER — PANTOPRAZOLE SODIUM 40 MG/1
40 TABLET, DELAYED RELEASE ORAL DAILY
Qty: 30 TABLET | Refills: 3 | Status: SHIPPED | OUTPATIENT
Start: 2020-10-05 | End: 2021-04-06 | Stop reason: SDUPTHER

## 2020-10-05 NOTE — TELEPHONE ENCOUNTER
Caller: DoJenni    Relationship: Emergency Contact    Best call back number: [phone]    Medication needed:   Requested Prescriptions     Pending Prescriptions Disp Refills   • pantoprazole (PROTONIX) 40 MG EC tablet 30 tablet 1       When do you need the refill by: TODAY    What details did the patient provide when requesting the medication: PATIENT IS CURRENTLY OUT OF MEDICATION.    Does the patient have less than a 3 day supply:  [x] Yes  [] No    What is the patient's preferred pharmacy: [facility] - [address] [phone] [facility] [phone]

## 2020-10-06 ENCOUNTER — TELEPHONE (OUTPATIENT)
Dept: FAMILY MEDICINE CLINIC | Facility: CLINIC | Age: 30
End: 2020-10-06

## 2021-04-06 ENCOUNTER — OFFICE VISIT (OUTPATIENT)
Dept: FAMILY MEDICINE CLINIC | Facility: CLINIC | Age: 31
End: 2021-04-06

## 2021-04-06 ENCOUNTER — LAB (OUTPATIENT)
Dept: LAB | Facility: HOSPITAL | Age: 31
End: 2021-04-06

## 2021-04-06 VITALS
WEIGHT: 232.2 LBS | BODY MASS INDEX: 33.24 KG/M2 | HEIGHT: 70 IN | DIASTOLIC BLOOD PRESSURE: 80 MMHG | SYSTOLIC BLOOD PRESSURE: 132 MMHG

## 2021-04-06 DIAGNOSIS — I10 ESSENTIAL HYPERTENSION, BENIGN: Primary | Chronic | ICD-10-CM

## 2021-04-06 DIAGNOSIS — G47.429 NARCOLEPSY DUE TO UNDERLYING CONDITION WITHOUT CATAPLEXY: Chronic | ICD-10-CM

## 2021-04-06 DIAGNOSIS — K21.9 GASTROESOPHAGEAL REFLUX DISEASE WITHOUT ESOPHAGITIS: Chronic | ICD-10-CM

## 2021-04-06 DIAGNOSIS — E66.09 CLASS 1 OBESITY DUE TO EXCESS CALORIES WITH SERIOUS COMORBIDITY AND BODY MASS INDEX (BMI) OF 33.0 TO 33.9 IN ADULT: Chronic | ICD-10-CM
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
## 2021-04-06 PROBLEM — M23.91 INTERNAL DERANGEMENT OF RIGHT KNEE: Status: RESOLVED | Noted: 2020-10-01 | Resolved: 2021-04-06

## 2021-04-06 PROBLEM — M25.561 ACUTE PAIN OF RIGHT KNEE: Status: RESOLVED | Noted: 2020-10-01 | Resolved: 2021-04-06

## 2021-04-06 LAB
ALBUMIN SERPL-MCNC: 4.3 G/DL (ref 3.5–5.2)
ALBUMIN/GLOB SERPL: 1.4 G/DL
ALP SERPL-CCNC: 84 U/L (ref 39–117)
ALT SERPL W P-5'-P-CCNC: 43 U/L (ref 1–41)
ANION GAP SERPL CALCULATED.3IONS-SCNC: 8.2 MMOL/L (ref 5–15)
AST SERPL-CCNC: 32 U/L (ref 1–40)
BILIRUB SERPL-MCNC: 0.2 MG/DL (ref 0–1.2)
BUN SERPL-MCNC: 11 MG/DL (ref 6–20)
BUN/CREAT SERPL: 11.5 (ref 7–25)
CALCIUM SPEC-SCNC: 8.9 MG/DL (ref 8.6–10.5)
CHLORIDE SERPL-SCNC: 102 MMOL/L (ref 98–107)
CO2 SERPL-SCNC: 29.8 MMOL/L (ref 22–29)
CREAT SERPL-MCNC: 0.96 MG/DL (ref 0.76–1.27)
GFR SERPL CREATININE-BSD FRML MDRD: 110 ML/MIN/1.73
GLOBULIN UR ELPH-MCNC: 3 GM/DL
GLUCOSE SERPL-MCNC: 86 MG/DL (ref 65–99)
MAGNESIUM SERPL-MCNC: 2 MG/DL (ref 1.6–2.6)
POTASSIUM SERPL-SCNC: 4.3 MMOL/L (ref 3.5–5.2)
PROT SERPL-MCNC: 7.3 G/DL (ref 6–8.5)
SODIUM SERPL-SCNC: 140 MMOL/L (ref 136–145)

## 2021-04-06 PROCEDURE — 36415 COLL VENOUS BLD VENIPUNCTURE: CPT | Performed by: FAMILY MEDICINE

## 2021-04-06 PROCEDURE — 83735 ASSAY OF MAGNESIUM: CPT | Performed by: FAMILY MEDICINE

## 2021-04-06 PROCEDURE — 80053 COMPREHEN METABOLIC PANEL: CPT | Performed by: FAMILY MEDICINE

## 2021-04-06 PROCEDURE — 99214 OFFICE O/P EST MOD 30 MIN: CPT | Performed by: FAMILY MEDICINE

## 2021-04-06 RX ORDER — PANTOPRAZOLE SODIUM 40 MG/1
40 TABLET, DELAYED RELEASE ORAL DAILY
Qty: 90 TABLET | Refills: 1 | Status: SHIPPED | OUTPATIENT
Start: 2021-04-06

## 2021-04-06 RX ORDER — AMLODIPINE BESYLATE 10 MG/1
10 TABLET ORAL DAILY
Qty: 90 TABLET | Refills: 3 | Status: SHIPPED | OUTPATIENT
Start: 2021-04-06 | End: 2022-10-14 | Stop reason: SDUPTHER

## 2021-04-06 RX ORDER — CHLORTHALIDONE 25 MG/1
25 TABLET ORAL DAILY
Qty: 90 TABLET | Refills: 3 | Status: SHIPPED | OUTPATIENT
Start: 2021-04-06

## 2021-04-06 NOTE — PROGRESS NOTES
Answers for HPI/ROS submitted by the patient on 4/6/2021  What is the primary reason for your visit?: Other  Please describe your symptoms.: Acid Reflux And Chest Pain  Have you had these symptoms before?: Yes  How long have you been having these symptoms?: Greater than 2 weeks  Please list any medications you are currently taking for this condition.: None  Please describe any probable cause for these symptoms. : N/A    Subjective   Roosevelt Ochoa is a 30 y.o. male.  Reevaluation hypertension obesity reflux history of narcolepsy.  In interim is gained weight due to change in schooling time.  Is going to work on getting weight off.  He works as a  as well as to be able to do so quickly.  Medicines have remained the same.  Narcolepsy medicine seem to be working.  Having some increased reflux symptoms related mainly to weight gain.  Is time for a potassium check.  Cholesterols always been within normal limits.   signing up for Covid vaccine today.    History of Present Illness   HPI    The following portions of the patient's history were reviewed and updated as appropriate: allergies, current medications, past family history, past medical history, past social history, past surgical history and problem list.    Review of Systems  Review of Systems   Constitutional: Negative for activity change, appetite change, fatigue and unexpected weight change.   HENT: Negative for trouble swallowing and voice change.    Eyes: Negative for redness and visual disturbance.   Respiratory: Negative for cough and wheezing.    Cardiovascular: Negative for chest pain and palpitations.   Gastrointestinal: Positive for abdominal pain (Mild dyspepsia chronic). Negative for constipation, diarrhea, nausea and vomiting.   Genitourinary: Negative for urgency.   Musculoskeletal: Negative for joint swelling.   Neurological: Negative for syncope and headaches.   Hematological: Negative for adenopathy.  "  Psychiatric/Behavioral: Negative for sleep disturbance.       Objective   Physical Exam  Physical Exam  Constitutional:       Appearance: He is well-developed.   HENT:      Head: Normocephalic.   Eyes:      Pupils: Pupils are equal, round, and reactive to light.   Neck:      Thyroid: No thyromegaly.   Cardiovascular:      Rate and Rhythm: Normal rate and regular rhythm.      Heart sounds: Normal heart sounds. No murmur heard.   No friction rub. No gallop.    Pulmonary:      Breath sounds: Normal breath sounds.   Abdominal:      General: There is no distension.      Palpations: Abdomen is soft. There is no mass.      Tenderness: There is no abdominal tenderness.   Musculoskeletal:         General: Normal range of motion.      Cervical back: Normal range of motion.      Comments: May go 3 to 5 seconds heavily muscled   Skin:     General: Skin is warm and dry.   Neurological:      Mental Status: He is alert and oriented to person, place, and time.      Deep Tendon Reflexes: Reflexes are normal and symmetric.   Psychiatric:         Attention and Perception: Attention normal.         Mood and Affect: Mood normal.         Speech: Speech normal.         Behavior: Behavior normal.         Thought Content: Thought content normal.         Cognition and Memory: Cognition normal.           Visit Vitals  /80   Ht 177.8 cm (70\")   Wt 105 kg (232 lb 3.2 oz)   BMI 33.32 kg/m²     Body mass index is 33.32 kg/m².      Assessment/Plan   Diagnoses and all orders for this visit:    1. Essential hypertension, benign (Primary)  -     Magnesium  -     Comprehensive Metabolic Panel  -     chlorthalidone (HYGROTON) 25 MG tablet; Take 1 tablet by mouth Daily.  Dispense: 90 tablet; Refill: 3  -     amLODIPine (NORVASC) 10 MG tablet; Take 1 tablet by mouth Daily.  Dispense: 90 tablet; Refill: 3    2. Class 1 obesity due to excess calories with serious comorbidity and body mass index (BMI) of 33.0 to 33.9 in adult    3. Gastroesophageal " reflux disease without esophagitis  -     pantoprazole (PROTONIX) 40 MG EC tablet; Take 1 tablet by mouth Daily.  Dispense: 90 tablet; Refill: 1    4. Narcolepsy due to underlying condition without cataplexy     on wt loss measures and programs  Counseled mainly lifestyle measures reflux precautions medications etc.  Sign up for Covid lab work recheck 6 months

## 2021-07-17 PROBLEM — U07.1 COVID-19: Status: ACTIVE | Noted: 2021-07-17

## 2021-07-17 PROBLEM — U07.1 LAB TEST POSITIVE FOR DETECTION OF COVID-19 VIRUS: Status: ACTIVE | Noted: 2021-07-17

## 2021-07-20 ENCOUNTER — HOSPITAL ENCOUNTER (EMERGENCY)
Facility: HOSPITAL | Age: 31
Discharge: HOME | End: 2021-07-20
Attending: EMERGENCY MEDICINE | Admitting: EMERGENCY MEDICINE

## 2021-07-20 ENCOUNTER — APPOINTMENT (OUTPATIENT)
Dept: GENERAL RADIOLOGY | Facility: HOSPITAL | Age: 31
End: 2021-07-20

## 2021-07-20 VITALS
OXYGEN SATURATION: 100 % | WEIGHT: 209 LBS | RESPIRATION RATE: 18 BRPM | TEMPERATURE: 99.1 F | BODY MASS INDEX: 29.26 KG/M2 | DIASTOLIC BLOOD PRESSURE: 72 MMHG | HEART RATE: 69 BPM | HEIGHT: 71 IN | SYSTOLIC BLOOD PRESSURE: 146 MMHG

## 2021-07-20 DIAGNOSIS — U07.1 COVID-19 VIRUS INFECTION: Primary | ICD-10-CM

## 2021-07-20 LAB
ALBUMIN SERPL-MCNC: 4.2 G/DL (ref 3.5–5.2)
ALBUMIN/GLOB SERPL: 1.4 G/DL
ALP SERPL-CCNC: 87 U/L (ref 39–117)
ALT SERPL W P-5'-P-CCNC: 34 U/L (ref 1–41)
ANION GAP SERPL CALCULATED.3IONS-SCNC: 9 MMOL/L (ref 5–15)
AST SERPL-CCNC: 20 U/L (ref 1–40)
BASOPHILS # BLD AUTO: 0.01 10*3/MM3 (ref 0–0.2)
BASOPHILS NFR BLD AUTO: 0.1 % (ref 0–1.5)
BILIRUB SERPL-MCNC: 0.3 MG/DL (ref 0–1.2)
BUN SERPL-MCNC: 8 MG/DL (ref 6–20)
BUN/CREAT SERPL: 9.2 (ref 7–25)
CALCIUM SPEC-SCNC: 8.6 MG/DL (ref 8.6–10.5)
CHLORIDE SERPL-SCNC: 103 MMOL/L (ref 98–107)
CO2 SERPL-SCNC: 24 MMOL/L (ref 22–29)
CREAT SERPL-MCNC: 0.87 MG/DL (ref 0.76–1.27)
D-DIMER, QUANTITATIVE (MAD,POW, STR): 422 NG/ML (FEU) (ref 0–470)
DEPRECATED RDW RBC AUTO: 42.1 FL (ref 37–54)
EOSINOPHIL # BLD AUTO: 0.01 10*3/MM3 (ref 0–0.4)
EOSINOPHIL NFR BLD AUTO: 0.1 % (ref 0.3–6.2)
ERYTHROCYTE [DISTWIDTH] IN BLOOD BY AUTOMATED COUNT: 12.8 % (ref 12.3–15.4)
GFR SERPL CREATININE-BSD FRML MDRD: 122 ML/MIN/1.73
GLOBULIN UR ELPH-MCNC: 3 GM/DL
GLUCOSE SERPL-MCNC: 106 MG/DL (ref 65–99)
HCT VFR BLD AUTO: 40.6 % (ref 37.5–51)
HGB BLD-MCNC: 13.5 G/DL (ref 13–17.7)
HOLD SPECIMEN: NORMAL
HOLD SPECIMEN: NORMAL
IMM GRANULOCYTES # BLD AUTO: 0.02 10*3/MM3 (ref 0–0.05)
IMM GRANULOCYTES NFR BLD AUTO: 0.3 % (ref 0–0.5)
LYMPHOCYTES # BLD AUTO: 1.45 10*3/MM3 (ref 0.7–3.1)
LYMPHOCYTES NFR BLD AUTO: 21.4 % (ref 19.6–45.3)
MCH RBC QN AUTO: 29.7 PG (ref 26.6–33)
MCHC RBC AUTO-ENTMCNC: 33.3 G/DL (ref 31.5–35.7)
MCV RBC AUTO: 89.2 FL (ref 79–97)
MONOCYTES # BLD AUTO: 0.52 10*3/MM3 (ref 0.1–0.9)
MONOCYTES NFR BLD AUTO: 7.7 % (ref 5–12)
NEUTROPHILS NFR BLD AUTO: 4.76 10*3/MM3 (ref 1.7–7)
NEUTROPHILS NFR BLD AUTO: 70.4 % (ref 42.7–76)
NRBC BLD AUTO-RTO: 0 /100 WBC (ref 0–0.2)
NT-PROBNP SERPL-MCNC: <5 PG/ML (ref 0–450)
PLATELET # BLD AUTO: 129 10*3/MM3 (ref 140–450)
PMV BLD AUTO: 11.6 FL (ref 6–12)
POTASSIUM SERPL-SCNC: 3.6 MMOL/L (ref 3.5–5.2)
PROT SERPL-MCNC: 7.2 G/DL (ref 6–8.5)
QT INTERVAL: 346 MS
QTC INTERVAL: 418 MS
RBC # BLD AUTO: 4.55 10*6/MM3 (ref 4.14–5.8)
SODIUM SERPL-SCNC: 136 MMOL/L (ref 136–145)
TROPONIN T SERPL-MCNC: <0.01 NG/ML (ref 0–0.03)
WBC # BLD AUTO: 6.77 10*3/MM3 (ref 3.4–10.8)
WHOLE BLOOD HOLD SPECIMEN: NORMAL

## 2021-07-20 PROCEDURE — 71045 X-RAY EXAM CHEST 1 VIEW: CPT

## 2021-07-20 PROCEDURE — 93005 ELECTROCARDIOGRAM TRACING: CPT | Performed by: EMERGENCY MEDICINE

## 2021-07-20 PROCEDURE — 25010000006 INJECTION, CASIRIVIMAB AND IMDEVIMAB, 1200 MG: Performed by: EMERGENCY MEDICINE

## 2021-07-20 PROCEDURE — 93010 ELECTROCARDIOGRAM REPORT: CPT | Performed by: INTERNAL MEDICINE

## 2021-07-20 PROCEDURE — 80053 COMPREHEN METABOLIC PANEL: CPT | Performed by: EMERGENCY MEDICINE

## 2021-07-20 PROCEDURE — 96361 HYDRATE IV INFUSION ADD-ON: CPT

## 2021-07-20 PROCEDURE — 85379 FIBRIN DEGRADATION QUANT: CPT | Performed by: EMERGENCY MEDICINE

## 2021-07-20 PROCEDURE — 96374 THER/PROPH/DIAG INJ IV PUSH: CPT

## 2021-07-20 PROCEDURE — 99284 EMERGENCY DEPT VISIT MOD MDM: CPT

## 2021-07-20 PROCEDURE — 84484 ASSAY OF TROPONIN QUANT: CPT | Performed by: EMERGENCY MEDICINE

## 2021-07-20 PROCEDURE — 83880 ASSAY OF NATRIURETIC PEPTIDE: CPT | Performed by: EMERGENCY MEDICINE

## 2021-07-20 PROCEDURE — M0243 CASIRIVI AND IMDEVI INFUSION: HCPCS | Performed by: EMERGENCY MEDICINE

## 2021-07-20 PROCEDURE — 85025 COMPLETE CBC W/AUTO DIFF WBC: CPT | Performed by: EMERGENCY MEDICINE

## 2021-07-20 PROCEDURE — 25010000002 KETOROLAC TROMETHAMINE PER 15 MG: Performed by: EMERGENCY MEDICINE

## 2021-07-20 RX ORDER — SODIUM CHLORIDE 0.9 % (FLUSH) 0.9 %
10 SYRINGE (ML) INJECTION AS NEEDED
Status: DISCONTINUED | OUTPATIENT
Start: 2021-07-20 | End: 2021-07-20 | Stop reason: HOSPADM

## 2021-07-20 RX ORDER — DEXAMETHASONE 4 MG/1
4 TABLET ORAL 2 TIMES DAILY WITH MEALS
Qty: 10 TABLET | Refills: 0 | OUTPATIENT
Start: 2021-07-20 | End: 2021-08-13

## 2021-07-20 RX ORDER — ASPIRIN 81 MG/1
324 TABLET, CHEWABLE ORAL ONCE
Status: COMPLETED | OUTPATIENT
Start: 2021-07-20 | End: 2021-07-20

## 2021-07-20 RX ORDER — KETOROLAC TROMETHAMINE 30 MG/ML
30 INJECTION, SOLUTION INTRAMUSCULAR; INTRAVENOUS ONCE
Status: COMPLETED | OUTPATIENT
Start: 2021-07-20 | End: 2021-07-20

## 2021-07-20 RX ORDER — METHYLPREDNISOLONE SODIUM SUCCINATE 125 MG/2ML
125 INJECTION, POWDER, LYOPHILIZED, FOR SOLUTION INTRAMUSCULAR; INTRAVENOUS ONCE AS NEEDED
Status: DISCONTINUED | OUTPATIENT
Start: 2021-07-20 | End: 2021-07-20 | Stop reason: HOSPADM

## 2021-07-20 RX ORDER — EPINEPHRINE 1 MG/ML
0.3 INJECTION, SOLUTION INTRAMUSCULAR; SUBCUTANEOUS ONCE AS NEEDED
Status: DISCONTINUED | OUTPATIENT
Start: 2021-07-20 | End: 2021-07-20 | Stop reason: HOSPADM

## 2021-07-20 RX ORDER — ONDANSETRON 4 MG/1
4 TABLET, ORALLY DISINTEGRATING ORAL EVERY 6 HOURS PRN
Qty: 15 TABLET | Refills: 0 | Status: SHIPPED | OUTPATIENT
Start: 2021-07-20

## 2021-07-20 RX ORDER — SODIUM CHLORIDE 9 MG/ML
30 INJECTION, SOLUTION INTRAVENOUS ONCE
Status: COMPLETED | OUTPATIENT
Start: 2021-07-20 | End: 2021-07-20

## 2021-07-20 RX ORDER — DIPHENHYDRAMINE HYDROCHLORIDE 50 MG/ML
50 INJECTION INTRAMUSCULAR; INTRAVENOUS ONCE AS NEEDED
Status: DISCONTINUED | OUTPATIENT
Start: 2021-07-20 | End: 2021-07-20 | Stop reason: HOSPADM

## 2021-07-20 RX ORDER — IBUPROFEN 800 MG/1
800 TABLET ORAL EVERY 8 HOURS PRN
Qty: 21 TABLET | Refills: 0 | OUTPATIENT
Start: 2021-07-20 | End: 2022-07-13

## 2021-07-20 RX ORDER — SODIUM CHLORIDE 9 MG/ML
125 INJECTION, SOLUTION INTRAVENOUS CONTINUOUS
Status: DISCONTINUED | OUTPATIENT
Start: 2021-07-20 | End: 2021-07-20 | Stop reason: HOSPADM

## 2021-07-20 RX ADMIN — SODIUM CHLORIDE 125 ML/HR: 9 INJECTION, SOLUTION INTRAVENOUS at 12:36

## 2021-07-20 RX ADMIN — IMDEVIMAB: 1332 INJECTION, SOLUTION, CONCENTRATE INTRAVENOUS at 13:54

## 2021-07-20 RX ADMIN — SODIUM CHLORIDE 1000 ML: 9 INJECTION, SOLUTION INTRAVENOUS at 12:36

## 2021-07-20 RX ADMIN — ASPIRIN 324 MG: 81 TABLET, CHEWABLE ORAL at 12:25

## 2021-07-20 RX ADMIN — SODIUM CHLORIDE 30 ML: 9 INJECTION, SOLUTION INTRAVENOUS at 14:14

## 2021-07-20 RX ADMIN — KETOROLAC TROMETHAMINE 30 MG: 30 INJECTION, SOLUTION INTRAMUSCULAR; INTRAVENOUS at 12:36

## 2021-07-20 NOTE — DISCHARGE INSTRUCTIONS
Quarantine at home.  Drink plenty of fluids to maintain hydration.  Return with any new or worsening symptoms, or any concerns.

## 2021-07-20 NOTE — ED PROVIDER NOTES
Subjective   Patient presents emergency department with complaint chest pain shortness of breath and nausea and diarrhea and multiple myalgias secondary to his Covid infection.  Patient has symptoms for several days.  Patient's had some weight loss.  Patient states his blood pressure been dropping at home.  He is normally on a blood pressure medication though is currently not taking her his medication, amlodipine, secondary to his illness.  Patient has decreased appetite.  Denies any fevers and arrives afebrile with normal oxygen saturations.  Normal blood pressure.  Normal respiratory rate no extremitis.          Review of Systems   Constitutional: Positive for activity change, appetite change, fatigue and fever. Negative for chills.   HENT: Positive for congestion.    Eyes: Negative.  Negative for photophobia and visual disturbance.   Respiratory: Positive for chest tightness and shortness of breath. Negative for cough.    Cardiovascular: Negative.  Negative for chest pain and palpitations.   Gastrointestinal: Positive for abdominal pain, diarrhea and nausea. Negative for constipation and vomiting.   Endocrine: Negative.    Genitourinary: Negative.  Negative for decreased urine volume, dysuria, flank pain and hematuria.   Musculoskeletal: Positive for myalgias. Negative for arthralgias, back pain, neck pain and neck stiffness.   Skin: Negative.  Negative for pallor.   Neurological: Positive for weakness, light-headedness and headaches. Negative for dizziness, syncope and numbness.   Psychiatric/Behavioral: Negative.  Negative for agitation, behavioral problems, confusion, decreased concentration and suicidal ideas. The patient is not nervous/anxious.    All other systems reviewed and are negative.      Past Medical History:   Diagnosis Date   • Asthma    • Blood chemistry abnormality    • Diarrhea    • Elevated blood pressure reading without diagnosis of hypertension    • Foot pain     probable tendonitis left  foot      • Hip pain    • Hyperglycemia    • Hypertension    • Nausea and vomiting    • Sickle cell disease (CMS/HCC)    • Sleep apnea        Allergies   Allergen Reactions   • Lisinopril Anaphylaxis       Past Surgical History:   Procedure Laterality Date   • DENTAL PROCEDURE     • EYE SURGERY         Family History   Problem Relation Age of Onset   • Hypertension Mother    • Heart disease Other    • Diabetes Other        Social History     Socioeconomic History   • Marital status: Single     Spouse name: Not on file   • Number of children: Not on file   • Years of education: Not on file   • Highest education level: Not on file   Tobacco Use   • Smoking status: Former Smoker   • Smokeless tobacco: Never Used   Substance and Sexual Activity   • Alcohol use: Not Currently     Alcohol/week: 24.0 standard drinks     Types: 24 Shots of liquor per week     Comment: Patient states he drinks approximately 24 oz per week   • Drug use: No     Types: Marijuana   • Sexual activity: Yes           Objective   Physical Exam  Vitals and nursing note reviewed.   Constitutional:       General: He is not in acute distress.     Appearance: He is well-developed. He is not ill-appearing or toxic-appearing.   HENT:      Head: Normocephalic and atraumatic.   Eyes:      Extraocular Movements: Extraocular movements intact.      Conjunctiva/sclera: Conjunctivae normal.   Neck:      Vascular: No JVD.   Cardiovascular:      Rate and Rhythm: Normal rate and regular rhythm.      Heart sounds: Normal heart sounds. No murmur heard.   No friction rub. No gallop.    Pulmonary:      Effort: Pulmonary effort is normal. No respiratory distress.      Breath sounds: No wheezing or rales.   Chest:      Chest wall: No tenderness.   Abdominal:      General: Bowel sounds are normal. There is no distension.      Palpations: Abdomen is soft. There is no mass.      Tenderness: There is no abdominal tenderness. There is no guarding or rebound.   Musculoskeletal:          General: Normal range of motion.      Cervical back: Normal range of motion and neck supple.   Skin:     General: Skin is warm and dry.      Capillary Refill: Capillary refill takes less than 2 seconds.   Neurological:      General: No focal deficit present.      Mental Status: He is alert and oriented to person, place, and time.   Psychiatric:         Mood and Affect: Mood normal.         Behavior: Behavior normal.         Thought Content: Thought content normal.         Judgment: Judgment normal.         Procedures           ED Course  ED Course as of Jul 20 1440   Tue Jul 20, 2021   1337 Unremarkable work-up.  No evidence of cardiopulmonary disease.  Labs appear normal.  Patient does have sickle cell anemia disease, which makes him eligible for monoclonal antibody therapy which she agrees to and would like to try at this time.    [PC]      ED Course User Index  [PC] Elfego Caldwell MD                                 Labs Reviewed   COMPREHENSIVE METABOLIC PANEL - Abnormal; Notable for the following components:       Result Value    Glucose 106 (*)     All other components within normal limits    Narrative:     GFR Normal >60  Chronic Kidney Disease <60  Kidney Failure <15     CBC WITH AUTO DIFFERENTIAL - Abnormal; Notable for the following components:    Platelets 129 (*)     Eosinophil % 0.1 (*)     All other components within normal limits   TROPONIN (IN-HOUSE) - Normal    Narrative:     Troponin T Reference Range:  <= 0.03 ng/mL-   Negative for AMI  >0.03 ng/mL-     Abnormal for myocardial necrosis.  Clinicians would have to utilize clinical acumen, EKG, Troponin and serial changes to determine if it is an Acute Myocardial Infarction or myocardial injury due to an underlying chronic condition.       Results may be falsely decreased if patient taking Biotin.     BNP (IN-HOUSE) - Normal    Narrative:     Among patients with dyspnea, NT-proBNP is highly sensitive for the detection of acute congestive  heart failure. In addition NT-proBNP of <300 pg/ml effectively rules out acute congestive heart failure with 99% negative predictive value.    Results may be falsely decreased if patient taking Biotin.     D-DIMER, QUANTITATIVE - Normal    Narrative:     Dimer values <500 ng/ml FEU are FDA approved as aid in diagnosis of deep venous thrombosis and pulmonary embolism.  This test should not be used in an exclusion strategy with pretest probability alone.    A recent guideline regarding diagnosis for pulmonary thromboembolism recommends an adjusted exclusion criterion of age x 10 ng/ml FEU for patients >50 years of age ([place] Intern Med 2015; 163: 701-711).     RAINBOW DRAW    Narrative:     The following orders were created for panel order [facility] Draw.  Procedure                               Abnormality         Status                     ---------                               -----------         ------                     Green Top (Gel)[[id number]]                                  Final result               Lavender Top[[id number]]                                     Final result               Gold Top - SST[[id number]]                                   Final result                 Please view results for these tests on the individual orders.   TROPONIN (IN-HOUSE)   CBC AND DIFFERENTIAL    Narrative:     The following orders were created for panel order CBC & Differential.  Procedure                               Abnormality         Status                     ---------                               -----------         ------                     Scan Slide[[id number]]                                                                  CBC Auto Differential[[id number]]        Abnormal            Final result                 Please view results for these tests on the individual orders.   GREEN TOP   LAVENDER TOP   GOLD TOP - SST       XR Chest 1 View   Final Result       1. No radiographic evidence of acute cardiopulmonary  disease.      Electronically signed by:  Ana Maria Daniel MD  7/20/2021 1:14 PM   CDT Workstation: [id number]        There is no acute recurrent pneumonia noted.  There is no lab data abnormalities.  Patient received monoclonal antibiotic therapy for the COVID-19 infection secondary to his sickle cell anemia.  Patient be discharged with symptomatic care.          MDM    Final diagnoses:   COVID-19 virus infection       ED Disposition  ED Disposition     ED Disposition Condition Comment    Discharge Stable           Jhonathan Nielsen MD  [address]  [address]LO[address]  [place] [id number] [phone]    On 7/28/2021  If not improved for further evaluation and care         Medication List      New Prescriptions    dexamethasone 4 MG tablet  Commonly known as: DECADRON  Take 1 tablet by mouth 2 (Two) Times a Day With Meals.     ibuprofen 800 MG tablet  Commonly known as: ADVIL,MOTRIN  Take 1 tablet by mouth Every 8 (Eight) Hours As Needed for Moderate Pain  or Fever.     ondansetron ODT 4 MG disintegrating tablet  Commonly known as: ZOFRAN-ODT  Place 1 tablet on the tongue Every 6 (Six) Hours As Needed for Nausea or Vomiting.           Where to Get Your Medications      These medications were sent to [facility] [address] [place] [address] [place] [facility] [place] [facility] - [phone] PH - [phone] [address] [place] [facility], [place] [id number]    Phone: [phone]   · dexamethasone 4 MG tablet  · ibuprofen 800 MG tablet  · ondansetron ODT 4 MG disintegrating tablet          Elfego Caldwell MD  07/20/21 4447

## 2021-07-20 NOTE — ED NOTES
Pt c/o chest pain bilaterally that radiates to the back and eye pain bilaterally that feels like pressure. Pt stated he has had chest pain for a while. He is also c/o abdominal pain. Pt stated he has had over 20 episodes of diarrhea and has lost 18lbs in the last week. Pt states he is feeling short of breath at this moment. O2 is at 100%. Vitals are stable.      Michell Hernandez, RN  07/20/21 0360

## 2021-07-21 ENCOUNTER — READMISSION MANAGEMENT (OUTPATIENT)
Dept: CALL CENTER | Facility: HOSPITAL | Age: 31
End: 2021-07-21

## 2021-07-21 NOTE — OUTREACH NOTE
COVID-19 Week 1 Survey      Responses   [facility] [place] patient discharged from?  [place]   Does the patient have one of the following disease processes/diagnoses(primary or secondary)?  COVID-19   COVID-19 underlying condition?  None   Call Number  Call 1   Week 1 Call successful?  No          Dory West RN

## 2021-07-21 NOTE — OUTREACH NOTE
Prep Survey      Responses   [facility] facility patient discharged from?  [place]   Is LACE score < 7 ?  Yes   Emergency Room discharge w/ pulse ox?  Yes   Eligibility  Readm Mgmt   Discharge diagnosis  Covid 19   Does the patient have one of the following disease processes/diagnoses(primary or secondary)?  COVID-19   Does the patient have Home health ordered?  No   Is there a DME ordered?  Yes   What DME was ordered?  Sent home w/ pulse ox   General alerts for this patient  ED Discharge - call x 3 only   Prep survey completed?  Yes          Lanette Galvan RN

## 2021-07-22 ENCOUNTER — READMISSION MANAGEMENT (OUTPATIENT)
Dept: CALL CENTER | Facility: HOSPITAL | Age: 31
End: 2021-07-22

## 2021-07-22 NOTE — OUTREACH NOTE
COVID-19 Week 1 Survey      Responses   [facility] patient discharged from?  [place]   Does the patient have one of the following disease processes/diagnoses(primary or secondary)?  COVID-19   COVID-19 underlying condition?  None   Call Number  Call 2   Week 1 Call successful?  No          Dory West RN

## 2021-07-23 ENCOUNTER — READMISSION MANAGEMENT (OUTPATIENT)
Dept: CALL CENTER | Facility: HOSPITAL | Age: 31
End: 2021-07-23

## 2021-07-23 NOTE — OUTREACH NOTE
COVID-19 Week 1 Survey      Responses   [facility] [place] patient discharged from?  [place]   Does the patient have one of the following disease processes/diagnoses(primary or secondary)?  COVID-19   COVID-19 underlying condition?  None   Call Number  Call 3   Week 1 Call successful?  No          Vidya Cristobal RN

## 2021-10-20 ENCOUNTER — OFFICE VISIT (OUTPATIENT)
Dept: PODIATRY | Facility: CLINIC | Age: 31
End: 2021-10-20

## 2021-10-20 VITALS
HEIGHT: 71 IN | BODY MASS INDEX: 31.36 KG/M2 | OXYGEN SATURATION: 99 % | DIASTOLIC BLOOD PRESSURE: 84 MMHG | WEIGHT: 224 LBS | HEART RATE: 97 BPM | SYSTOLIC BLOOD PRESSURE: 156 MMHG

## 2021-10-20 DIAGNOSIS — S86.011A STRAIN OF RIGHT ACHILLES TENDON, INITIAL ENCOUNTER: Primary | ICD-10-CM

## 2021-10-20 PROCEDURE — 99214 OFFICE O/P EST MOD 30 MIN: CPT | Performed by: PODIATRIST

## 2021-10-20 RX ORDER — METHYLPREDNISOLONE 4 MG/1
TABLET ORAL
Qty: 21 TABLET | Refills: 0 | OUTPATIENT
Start: 2021-10-20 | End: 2021-12-26

## 2021-10-20 NOTE — PROGRESS NOTES
Roosevelt Ochoa  1990  31 y.o. male    10/20/2021     Chief Complaint   Patient presents with   • Right Foot - Pain, Follow-up       History of Present Illness    Roosevelt Ochoa is a 31 y.o.male who presents to clinic today with chief complaint of right lower leg injury.  Injury was sustained on October 14.  Patient was given instruction football practice running backwards when he felt something pop in the back of his leg.    Past Medical History:   Diagnosis Date   • Asthma    • Blood chemistry abnormality    • Diarrhea    • Elevated blood pressure reading without diagnosis of hypertension    • Foot pain     probable tendonitis left foot      • Hip pain    • Hyperglycemia    • Hypertension    • Nausea and vomiting    • Sickle cell disease (HCC)    • Sleep apnea          Past Surgical History:   Procedure Laterality Date   • DENTAL PROCEDURE     • EYE SURGERY           Family History   Problem Relation Age of Onset   • Hypertension Mother    • Heart disease Other    • Diabetes Other        Allergies   Allergen Reactions   • Lisinopril Anaphylaxis       Social History     Socioeconomic History   • Marital status: Single   Tobacco Use   • Smoking status: Former Smoker   • Smokeless tobacco: Never Used   Vaping Use   • Vaping Use: Never used   Substance and Sexual Activity   • Alcohol use: Not Currently     Alcohol/week: 24.0 standard drinks     Types: 24 Shots of liquor per week     Comment: Patient states he drinks approximately 24 oz per week   • Drug use: No     Types: Marijuana   • Sexual activity: Yes         Current Outpatient Medications   Medication Sig Dispense Refill   • amLODIPine (NORVASC) 10 MG tablet Take 1 tablet by mouth Daily. 90 tablet 3   • amphetamine-dextroamphetamine (ADDERALL) 20 MG tablet Take 20 mg by mouth 3 (Three) Times a Day.     • amphetamine-dextroamphetamine XR (ADDERALL XR) 30 MG 24 hr capsule Take 1 capsule by mouth Daily For narcolepsy. 30 capsule 0   • armodafinil  "(NUVIGIL) 150 MG tablet Take 1 tablet by mouth Every Morning. 30 tablet 0   • Ca, Mg, K, and Na Oxybates 500 MG/ML solution Take 2.25 g by mouth.     • chlorthalidone (HYGROTON) 25 MG tablet Take 1 tablet by mouth Daily. 90 tablet 3   • hydrocortisone 1 % cream Apply  topically to the appropriate area as directed 2 (Two) Times a Day. Do not apply longer than 2 weeks 20 g 0   • ibuprofen (ADVIL,MOTRIN) 800 MG tablet Take 1 tablet by mouth Every 8 (Eight) Hours As Needed for Moderate Pain  or Fever. 21 tablet 0   • ondansetron ODT (ZOFRAN-ODT) 4 MG disintegrating tablet Place 1 tablet on the tongue Every 6 (Six) Hours As Needed for Nausea or Vomiting. 15 tablet 0   • pantoprazole (PROTONIX) 40 MG EC tablet Take 1 tablet by mouth Daily. 90 tablet 1   • Sodium Oxybate (XYREM PO) Take 60 mL by mouth 2 (two) times a day.     • tobramycin 0.3 % solution ophthalmic solution Administer 2 drops to the right eye Every 4 (Four) Hours. 5 mL 0   • methylPREDNISolone (MEDROL) 4 MG dose pack Take as directed 21 tablet 0     No current facility-administered medications for this visit.       Review of Systems   Constitutional: Negative.    HENT: Negative.    Eyes: Negative.    Respiratory: Negative.    Cardiovascular: Positive for leg swelling.   Gastrointestinal: Negative.    Endocrine: Negative.    Genitourinary: Negative.    Musculoskeletal:        Right lower extremity pain    Skin: Negative.    Allergic/Immunologic: Negative.    Neurological: Negative.    Hematological: Negative.    Psychiatric/Behavioral: Negative.          OBJECTIVE    /84   Pulse 97   Ht 180.3 cm (71\")   Wt 102 kg (224 lb)   SpO2 99%   BMI 31.24 kg/m²       Physical Exam   Constitutional: He is oriented to person, place, and time. He appears well-developed. No distress.   HENT:   Head: Normocephalic and atraumatic.   Nose: Nose normal.   Eyes: Pupils are equal, round, and reactive to light. Conjunctivae are normal.   Pulmonary/Chest: Effort normal. " No respiratory distress. He has no wheezes.   Musculoskeletal: Normal range of motion. Swelling and tenderness present.   Neurological: He is alert and oriented to person, place, and time.   Skin: Skin is warm and dry. Capillary refill takes less than 2 seconds.   Psychiatric: His behavior is normal. Thought content normal.   Vitals reviewed.      Gait: antalgic     Assistive Device: none     Right lower Extremity    Cardiovascular:    Palpable pedal pulses  No ecchymosis, mild edema right posterior lower extremity  Musculoskeletal:  Muscle strength decreased  Pain on palpation to the mid substance of the Achilles tendon  Pain on end range dorsiflexion  Dermatological:   Skin is warm, dry and intact  Neurological:   Sensation intact to light touch      Procedures        ASSESSMENT AND PLAN    Diagnoses and all orders for this visit:    1. Strain of right Achilles tendon, initial encounter (Primary)  -     XR Foot 3+ View Right    Other orders  -     methylPREDNISolone (MEDROL) 4 MG dose pack; Take as directed  Dispense: 21 tablet; Refill: 0      - Comprehensive foot and ankle exam performed.   - Radiographs taken and reviewed.  No acute osseous or articular normality's.  -Discussed etiology and treatment of right Achilles tendon strain.  Recommended brief period of immobilization/protected weightbearing in cam boot which patient agreed to.  Dispensed cam boot.  -Rx for Medrol Dosepak for acute inflammation  - All questions were answered to the patients satisfaction.  - RTC 2 weeks          This document has been electronically signed by Brian Leyva DPM on October 21, 2021 12:34 CDT     10/21/2021  12:34 CDT

## 2021-11-22 ENCOUNTER — OFFICE VISIT (OUTPATIENT)
Dept: PODIATRY | Facility: CLINIC | Age: 31
End: 2021-11-22

## 2021-11-22 VITALS — WEIGHT: 224 LBS | HEIGHT: 71 IN | HEART RATE: 57 BPM | OXYGEN SATURATION: 98 % | BODY MASS INDEX: 31.36 KG/M2

## 2021-11-22 DIAGNOSIS — S86.011D STRAIN OF RIGHT ACHILLES TENDON, SUBSEQUENT ENCOUNTER: Primary | ICD-10-CM

## 2021-11-22 PROCEDURE — 99212 OFFICE O/P EST SF 10 MIN: CPT | Performed by: PODIATRIST

## 2021-11-22 NOTE — PROGRESS NOTES
Roosevelt Ochoa  1990  31 y.o. male    F/U on right AT strain.     11/22/2021     Chief Complaint   Patient presents with   • Right Foot - Follow-up       History of Present Illness    Roosevelt Ochoa is a 31 y.o.male who presents to clinic today for follow-up of his right Achilles tendon injury.  Date of injury October 14.  Patient is doing very well at this time.    Past Medical History:   Diagnosis Date   • Asthma    • Blood chemistry abnormality    • Diarrhea    • Elevated blood pressure reading without diagnosis of hypertension    • Foot pain     probable tendonitis left foot      • Hip pain    • Hyperglycemia    • Hypertension    • Nausea and vomiting    • Sickle cell disease (HCC)    • Sleep apnea          Past Surgical History:   Procedure Laterality Date   • DENTAL PROCEDURE     • EYE SURGERY           Family History   Problem Relation Age of Onset   • Hypertension Mother    • Heart disease Other    • Diabetes Other        Allergies   Allergen Reactions   • Lisinopril Anaphylaxis       Social History     Socioeconomic History   • Marital status: Single   Tobacco Use   • Smoking status: Former Smoker   • Smokeless tobacco: Never Used   Vaping Use   • Vaping Use: Never used   Substance and Sexual Activity   • Alcohol use: Not Currently     Alcohol/week: 24.0 standard drinks     Types: 24 Shots of liquor per week     Comment: Patient states he drinks approximately 24 oz per week   • Drug use: No     Types: Marijuana   • Sexual activity: Yes         Current Outpatient Medications   Medication Sig Dispense Refill   • amLODIPine (NORVASC) 10 MG tablet Take 1 tablet by mouth Daily. 90 tablet 3   • amphetamine-dextroamphetamine (ADDERALL) 20 MG tablet Take 20 mg by mouth 3 (Three) Times a Day.     • amphetamine-dextroamphetamine XR (ADDERALL XR) 30 MG 24 hr capsule Take 1 capsule by mouth Daily For narcolepsy. 30 capsule 0   • armodafinil (NUVIGIL) 150 MG tablet Take 1 tablet by mouth Every Morning.  "30 tablet 0   • Ca, Mg, K, and Na Oxybates 500 MG/ML solution Take 2.25 g by mouth.     • chlorthalidone (HYGROTON) 25 MG tablet Take 1 tablet by mouth Daily. 90 tablet 3   • hydrocortisone 1 % cream Apply  topically to the appropriate area as directed 2 (Two) Times a Day. Do not apply longer than 2 weeks 20 g 0   • ibuprofen (ADVIL,MOTRIN) 800 MG tablet Take 1 tablet by mouth Every 8 (Eight) Hours As Needed for Moderate Pain  or Fever. 21 tablet 0   • ondansetron ODT (ZOFRAN-ODT) 4 MG disintegrating tablet Place 1 tablet on the tongue Every 6 (Six) Hours As Needed for Nausea or Vomiting. 15 tablet 0   • pantoprazole (PROTONIX) 40 MG EC tablet Take 1 tablet by mouth Daily. 90 tablet 1   • Sodium Oxybate (XYREM PO) Take 60 mL by mouth 2 (two) times a day.     • tobramycin 0.3 % solution ophthalmic solution Administer 2 drops to the right eye Every 4 (Four) Hours. 5 mL 0   • methylPREDNISolone (MEDROL) 4 MG dose pack Take as directed 21 tablet 0     No current facility-administered medications for this visit.       Review of Systems   Constitutional: Negative.    HENT: Negative.    Respiratory: Negative.    Gastrointestinal: Negative.    Musculoskeletal: Negative.             Skin: Negative.    Psychiatric/Behavioral: Negative.          OBJECTIVE    Pulse 57   Ht 180.3 cm (71\")   Wt 102 kg (224 lb)   SpO2 98%   BMI 31.24 kg/m²       Physical Exam   Constitutional: He is oriented to person, place, and time. He appears well-developed. No distress.   HENT:   Head: Normocephalic and atraumatic.   Nose: Nose normal.   Eyes: Pupils are equal, round, and reactive to light. Conjunctivae are normal.   Pulmonary/Chest: Effort normal. No respiratory distress. He has no wheezes.   Musculoskeletal: Normal range of motion. No tenderness.   Neurological: He is alert and oriented to person, place, and time.   Skin: Skin is warm and dry. Capillary refill takes less than 2 seconds.   Psychiatric: His behavior is normal. Thought " content normal.   Vitals reviewed.      Gait: Normal    Assistive Device: none     Right lower Extremity    Cardiovascular:    Palpable pedal pulses  No edema  Musculoskeletal:  Muscle strength WNL  No pain on palpation  Dermatological:   Skin is warm, dry and intact  Neurological:   Sensation intact to light touch      Procedures        ASSESSMENT AND PLAN    Diagnoses and all orders for this visit:    1. Strain of right Achilles tendon, subsequent encounter (Primary)      -Patient is doing very well.  Encourage patient to progress activity as tolerated without restriction.  - All questions were answered to the patients satisfaction.  - RTC as needed          This document has been electronically signed by Brian Leyva DPM on November 22, 2021 15:56 CST     11/22/2021  15:56 CST

## 2021-12-26 PROCEDURE — 87635 SARS-COV-2 COVID-19 AMP PRB: CPT | Performed by: NURSE PRACTITIONER

## 2021-12-28 PROBLEM — B97.89 VIRAL RESPIRATORY ILLNESS: Status: ACTIVE | Noted: 2021-12-28

## 2021-12-28 PROBLEM — J98.8 VIRAL RESPIRATORY ILLNESS: Status: ACTIVE | Noted: 2021-12-28

## 2022-01-11 PROCEDURE — 87635 SARS-COV-2 COVID-19 AMP PRB: CPT | Performed by: FAMILY MEDICINE

## 2022-10-11 ENCOUNTER — HOSPITAL ENCOUNTER (EMERGENCY)
Facility: HOSPITAL | Age: 32
Discharge: HOME | End: 2022-10-11
Attending: STUDENT IN AN ORGANIZED HEALTH CARE EDUCATION/TRAINING PROGRAM | Admitting: STUDENT IN AN ORGANIZED HEALTH CARE EDUCATION/TRAINING PROGRAM

## 2022-10-11 ENCOUNTER — APPOINTMENT (OUTPATIENT)
Dept: CT IMAGING | Facility: HOSPITAL | Age: 32
End: 2022-10-11

## 2022-10-11 VITALS
OXYGEN SATURATION: 98 % | SYSTOLIC BLOOD PRESSURE: 165 MMHG | WEIGHT: 228 LBS | HEART RATE: 93 BPM | BODY MASS INDEX: 31.92 KG/M2 | HEIGHT: 71 IN | RESPIRATION RATE: 18 BRPM | TEMPERATURE: 98.9 F | DIASTOLIC BLOOD PRESSURE: 83 MMHG

## 2022-10-11 DIAGNOSIS — R63.4 LOSS OF WEIGHT: ICD-10-CM

## 2022-10-11 DIAGNOSIS — R52 BODY ACHES: Primary | ICD-10-CM

## 2022-10-11 DIAGNOSIS — R53.83 FATIGUE, UNSPECIFIED TYPE: ICD-10-CM

## 2022-10-11 LAB
AMPHET+METHAMPHET UR QL: POSITIVE
AMPHETAMINES UR QL: NEGATIVE
ANION GAP SERPL CALCULATED.3IONS-SCNC: 12 MMOL/L (ref 5–15)
BARBITURATES UR QL SCN: NEGATIVE
BASOPHILS # BLD AUTO: 0.04 10*3/MM3 (ref 0–0.2)
BASOPHILS NFR BLD AUTO: 0.3 % (ref 0–1.5)
BENZODIAZ UR QL SCN: NEGATIVE
BILIRUB UR QL STRIP: NEGATIVE
BUN SERPL-MCNC: 14 MG/DL (ref 6–20)
BUN/CREAT SERPL: 13.2 (ref 7–25)
BUPRENORPHINE SERPL-MCNC: NEGATIVE NG/ML
CALCIUM SPEC-SCNC: 9.9 MG/DL (ref 8.6–10.5)
CANNABINOIDS SERPL QL: POSITIVE
CHLORIDE SERPL-SCNC: 101 MMOL/L (ref 98–107)
CK SERPL-CCNC: 477 U/L (ref 20–200)
CLARITY UR: ABNORMAL
CO2 SERPL-SCNC: 24 MMOL/L (ref 22–29)
COCAINE UR QL: NEGATIVE
COLOR UR: YELLOW
CREAT SERPL-MCNC: 1.06 MG/DL (ref 0.76–1.27)
DEPRECATED RDW RBC AUTO: 40.1 FL (ref 37–54)
EGFRCR SERPLBLD CKD-EPI 2021: 94.4 ML/MIN/1.73
EOSINOPHIL # BLD AUTO: 0.01 10*3/MM3 (ref 0–0.4)
EOSINOPHIL NFR BLD AUTO: 0.1 % (ref 0.3–6.2)
ERYTHROCYTE [DISTWIDTH] IN BLOOD BY AUTOMATED COUNT: 12.7 % (ref 12.3–15.4)
GLUCOSE SERPL-MCNC: 98 MG/DL (ref 65–99)
GLUCOSE UR STRIP-MCNC: NEGATIVE MG/DL
HCT VFR BLD AUTO: 40.6 % (ref 37.5–51)
HGB BLD-MCNC: 13.7 G/DL (ref 13–17.7)
HGB UR QL STRIP.AUTO: NEGATIVE
HOLD SPECIMEN: NORMAL
IMM GRANULOCYTES # BLD AUTO: 0.08 10*3/MM3 (ref 0–0.05)
IMM GRANULOCYTES NFR BLD AUTO: 0.6 % (ref 0–0.5)
KETONES UR QL STRIP: NEGATIVE
LEUKOCYTE ESTERASE UR QL STRIP.AUTO: NEGATIVE
LYMPHOCYTES # BLD AUTO: 0.74 10*3/MM3 (ref 0.7–3.1)
LYMPHOCYTES NFR BLD AUTO: 5.4 % (ref 19.6–45.3)
MAGNESIUM SERPL-MCNC: 1.6 MG/DL (ref 1.6–2.6)
MCH RBC QN AUTO: 29.5 PG (ref 26.6–33)
MCHC RBC AUTO-ENTMCNC: 33.7 G/DL (ref 31.5–35.7)
MCV RBC AUTO: 87.5 FL (ref 79–97)
METHADONE UR QL SCN: NEGATIVE
MONOCYTES # BLD AUTO: 1 10*3/MM3 (ref 0.1–0.9)
MONOCYTES NFR BLD AUTO: 7.3 % (ref 5–12)
NEUTROPHILS NFR BLD AUTO: 11.91 10*3/MM3 (ref 1.7–7)
NEUTROPHILS NFR BLD AUTO: 86.3 % (ref 42.7–76)
NITRITE UR QL STRIP: NEGATIVE
NRBC BLD AUTO-RTO: 0 /100 WBC (ref 0–0.2)
OPIATES UR QL: NEGATIVE
OXYCODONE UR QL SCN: NEGATIVE
PCP UR QL SCN: NEGATIVE
PH UR STRIP.AUTO: 7.5 [PH] (ref 5–9)
PLAT MORPH BLD: NORMAL
PLATELET # BLD AUTO: 181 10*3/MM3 (ref 140–450)
PMV BLD AUTO: 11.3 FL (ref 6–12)
POTASSIUM SERPL-SCNC: 4 MMOL/L (ref 3.5–5.2)
PROPOXYPH UR QL: NEGATIVE
PROT UR QL STRIP: NEGATIVE
RBC # BLD AUTO: 4.64 10*6/MM3 (ref 4.14–5.8)
RBC MORPH BLD: NORMAL
SODIUM SERPL-SCNC: 137 MMOL/L (ref 136–145)
SP GR UR STRIP: 1.02 (ref 1–1.03)
TRICYCLICS UR QL SCN: NEGATIVE
UROBILINOGEN UR QL STRIP: ABNORMAL
WBC MORPH BLD: NORMAL
WBC NRBC COR # BLD: 13.78 10*3/MM3 (ref 3.4–10.8)

## 2022-10-11 PROCEDURE — 84443 ASSAY THYROID STIM HORMONE: CPT | Performed by: FAMILY MEDICINE

## 2022-10-11 PROCEDURE — 36415 COLL VENOUS BLD VENIPUNCTURE: CPT

## 2022-10-11 PROCEDURE — 83735 ASSAY OF MAGNESIUM: CPT | Performed by: STUDENT IN AN ORGANIZED HEALTH CARE EDUCATION/TRAINING PROGRAM

## 2022-10-11 PROCEDURE — 80048 BASIC METABOLIC PNL TOTAL CA: CPT | Performed by: STUDENT IN AN ORGANIZED HEALTH CARE EDUCATION/TRAINING PROGRAM

## 2022-10-11 PROCEDURE — 25010000002 IOPAMIDOL 61 % SOLUTION: Performed by: STUDENT IN AN ORGANIZED HEALTH CARE EDUCATION/TRAINING PROGRAM

## 2022-10-11 PROCEDURE — 84439 ASSAY OF FREE THYROXINE: CPT | Performed by: FAMILY MEDICINE

## 2022-10-11 PROCEDURE — 80306 DRUG TEST PRSMV INSTRMNT: CPT | Performed by: STUDENT IN AN ORGANIZED HEALTH CARE EDUCATION/TRAINING PROGRAM

## 2022-10-11 PROCEDURE — 82550 ASSAY OF CK (CPK): CPT | Performed by: STUDENT IN AN ORGANIZED HEALTH CARE EDUCATION/TRAINING PROGRAM

## 2022-10-11 PROCEDURE — 36415 COLL VENOUS BLD VENIPUNCTURE: CPT | Performed by: STUDENT IN AN ORGANIZED HEALTH CARE EDUCATION/TRAINING PROGRAM

## 2022-10-11 PROCEDURE — 81003 URINALYSIS AUTO W/O SCOPE: CPT | Performed by: STUDENT IN AN ORGANIZED HEALTH CARE EDUCATION/TRAINING PROGRAM

## 2022-10-11 PROCEDURE — 74177 CT ABD & PELVIS W/CONTRAST: CPT

## 2022-10-11 PROCEDURE — 99283 EMERGENCY DEPT VISIT LOW MDM: CPT

## 2022-10-11 PROCEDURE — 85007 BL SMEAR W/DIFF WBC COUNT: CPT | Performed by: STUDENT IN AN ORGANIZED HEALTH CARE EDUCATION/TRAINING PROGRAM

## 2022-10-11 PROCEDURE — 85025 COMPLETE CBC W/AUTO DIFF WBC: CPT | Performed by: STUDENT IN AN ORGANIZED HEALTH CARE EDUCATION/TRAINING PROGRAM

## 2022-10-11 RX ADMIN — IOPAMIDOL 90 ML: 612 INJECTION, SOLUTION INTRAVENOUS at 20:10

## 2022-10-12 NOTE — ED PROVIDER NOTES
"Subjective   History of Present Illness  32-year-old male comes to the ER with a chief complaint of whole body pain, constipation, weight loss that has been going on for 9+ months ever since he had the coronavirus for the third time.  Patient states that he is able to \"feel my bones\" which is not normal.  Patient normally is \"very muscular\".  He denies other symptoms.    History provided by:  Patient and significant other   used: No        Review of Systems   Constitutional: Positive for activity change, fatigue and unexpected weight change. Negative for appetite change, chills and fever.   HENT: Negative for drooling.    Eyes: Negative for redness.   Respiratory: Negative for shortness of breath.    Cardiovascular: Negative for chest pain.   Gastrointestinal: Positive for constipation. Negative for abdominal pain, diarrhea, nausea and vomiting.   Genitourinary: Negative for dysuria and flank pain.   Skin: Negative for color change.   Neurological: Negative for seizures.   Psychiatric/Behavioral: Negative for confusion.       Past Medical History:   Diagnosis Date   • Asthma    • Blood chemistry abnormality    • Diarrhea    • Elevated blood pressure reading without diagnosis of hypertension    • Foot pain     probable tendonitis left foot      • Hip pain    • Hyperglycemia    • Hypertension    • Nausea and vomiting    • Sickle cell disease (HCC)    • Sleep apnea        Allergies   Allergen Reactions   • Lisinopril Anaphylaxis       Past Surgical History:   Procedure Laterality Date   • DENTAL PROCEDURE     • EYE SURGERY         Family History   Problem Relation Age of Onset   • Hypertension Mother    • Heart disease Other    • Diabetes Other        Social History     Socioeconomic History   • Marital status: Single   Tobacco Use   • Smoking status: Former   • Smokeless tobacco: Never   Vaping Use   • Vaping Use: Never used   Substance and Sexual Activity   • Alcohol use: Not Currently     " "Alcohol/week: 24.0 standard drinks     Types: 24 Shots of liquor per week     Comment: Patient states he drinks approximately 24 oz per week   • Drug use: No     Types: Marijuana   • Sexual activity: Yes           Objective    Vitals:    10/11/22 1831 10/11/22 2119   BP: 169/88 165/83   BP Location: Right arm Right arm   Patient Position: Sitting Sitting   Pulse: 107 93   Resp: 18 18   Temp: 98.9 °F (37.2 °C)    TempSrc: Oral    SpO2: 98% 98%   Weight: 103 kg (228 lb)    Height: 180.3 cm (71\")        Physical Exam  Vitals and nursing note reviewed.   Constitutional:       General: He is not in acute distress.     Appearance: He is well-developed. He is not ill-appearing, toxic-appearing or diaphoretic.   HENT:      Head: Normocephalic.      Right Ear: External ear normal.      Left Ear: External ear normal.   Eyes:      Conjunctiva/sclera: Conjunctivae normal.   Pulmonary:      Effort: Pulmonary effort is normal. No accessory muscle usage or respiratory distress.   Chest:      Chest wall: No tenderness.   Abdominal:      Palpations: Abdomen is soft.      Tenderness: There is no abdominal tenderness (deep palpation).   Skin:     General: Skin is warm and dry.      Capillary Refill: Capillary refill takes less than 2 seconds.   Neurological:      Mental Status: He is alert and oriented to person, place, and time.   Psychiatric:         Behavior: Behavior normal.         Procedures           ED Course      Results for orders placed or performed during the hospital encounter of 10/11/22   Basic Metabolic Panel    Specimen: Blood   Result Value Ref Range    Glucose 98 65 - 99 mg/dL    BUN 14 6 - 20 mg/dL    Creatinine 1.06 0.76 - 1.27 mg/dL    Sodium 137 136 - 145 mmol/L    Potassium 4.0 3.5 - 5.2 mmol/L    Chloride 101 98 - 107 mmol/L    CO2 24.0 22.0 - 29.0 mmol/L    Calcium 9.9 8.6 - 10.5 mg/dL    BUN/Creatinine Ratio 13.2 7.0 - 25.0    Anion Gap 12.0 5.0 - 15.0 mmol/L    eGFR 94.4 >60.0 mL/min/1.73   Urinalysis With " Microscopic If Indicated (No Culture) - Urine, Clean Catch    Specimen: Urine, Clean Catch   Result Value Ref Range    Color, UA Yellow Yellow, Straw, Dark Yellow, Anny    Appearance, UA Cloudy (A) Clear    pH, UA 7.5 5.0 - 9.0    Specific Gravity, UA 1.020 1.003 - 1.030    Glucose, UA Negative Negative    Ketones, UA Negative Negative    Bilirubin, UA Negative Negative    Blood, UA Negative Negative    Protein, UA Negative Negative    Leuk Esterase, UA Negative Negative    Nitrite, UA Negative Negative    Urobilinogen, UA 0.2 E.U./dL 0.2 - 1.0 E.U./dL   Magnesium    Specimen: Blood   Result Value Ref Range    Magnesium 1.6 1.6 - 2.6 mg/dL   CBC Auto Differential    Specimen: Blood   Result Value Ref Range    WBC 13.78 (H) 3.40 - 10.80 10*3/mm3    RBC 4.64 4.14 - 5.80 10*6/mm3    Hemoglobin 13.7 13.0 - 17.7 g/dL    Hematocrit 40.6 37.5 - 51.0 %    MCV 87.5 79.0 - 97.0 fL    MCH 29.5 26.6 - 33.0 pg    MCHC 33.7 31.5 - 35.7 g/dL    RDW 12.7 12.3 - 15.4 %    RDW-SD 40.1 37.0 - 54.0 fl    MPV 11.3 6.0 - 12.0 fL    Platelets 181 140 - 450 10*3/mm3    Neutrophil % 86.3 (H) 42.7 - 76.0 %    Lymphocyte % 5.4 (L) 19.6 - 45.3 %    Monocyte % 7.3 5.0 - 12.0 %    Eosinophil % 0.1 (L) 0.3 - 6.2 %    Basophil % 0.3 0.0 - 1.5 %    Immature Grans % 0.6 (H) 0.0 - 0.5 %    Neutrophils, Absolute 11.91 (H) 1.70 - 7.00 10*3/mm3    Lymphocytes, Absolute 0.74 0.70 - 3.10 10*3/mm3    Monocytes, Absolute 1.00 (H) 0.10 - 0.90 10*3/mm3    Eosinophils, Absolute 0.01 0.00 - 0.40 10*3/mm3    Basophils, Absolute 0.04 0.00 - 0.20 10*3/mm3    Immature Grans, Absolute 0.08 (H) 0.00 - 0.05 10*3/mm3    nRBC 0.0 0.0 - 0.2 /100 WBC   CK    Specimen: Blood   Result Value Ref Range    Creatine Kinase 477 (H) 20 - 200 U/L   Urine Drug Screen - Urine, Clean Catch    Specimen: Urine, Clean Catch   Result Value Ref Range    THC, Screen, Urine Positive (A) Negative    Phencyclidine (PCP), Urine Negative Negative    Cocaine Screen, Urine Negative Negative     Methamphetamine, Ur Negative Negative    Opiate Screen Negative Negative    Amphetamine Screen, Urine Positive (A) Negative    Benzodiazepine Screen, Urine Negative Negative    Tricyclic Antidepressants Screen Negative Negative    Methadone Screen, Urine Negative Negative    Barbiturates Screen, Urine Negative Negative    Oxycodone Screen, Urine Negative Negative    Propoxyphene Screen Negative Negative    Buprenorphine, Screen, Urine Negative Negative   Scan Slide    Specimen: Blood   Result Value Ref Range    RBC Morphology Normal Normal    WBC Morphology Normal Normal    Platelet Morphology Normal Normal   Gold Top - SST   Result Value Ref Range    Extra Tube Hold for add-ons.      CT Abdomen Pelvis With Contrast   Final Result   1.  No acute intra-abdominal or pelvic abnormality.   2.  Small fat-containing umbilical hernia.      Electronically signed by:  Lu Mcfarland MD  10/11/2022 9:25 PM   CDT Workstation: [id number]                                             MDM  Number of Diagnoses or Management Options  Body aches: new and requires workup  Fatigue, unspecified type: new and requires workup  Loss of weight: new and requires workup  Diagnosis management comments: Vital signs are stable, afebrile.  Labs are unremarkable.  CK slightly elevated at 500.  UDS positive for THC's and amphetamines.  CT abdomen pelvis negative for acute findings.  Recommend follow-up with PCP and GI.  Return precautions given.  Patient states understanding and is agreeable to the plan      Final diagnoses:   Body aches   Loss of weight   Fatigue, unspecified type       ED Disposition  ED Disposition     ED Disposition   Discharge    Condition   Stable    Comment   --             Jhonathan Nielsen MD  [address] [place] [address] [address]IVE  [address] [facility] [address] [phone]    Schedule an appointment as soon as possible for a visit in 2 days  ER follow up    [address]   [facility]  [address]  [phone]  Schedule an appointment as soon as possible for a visit in 2 days  ER follow up         Medication List      No changes were made to your prescriptions during this visit.          Robbi Downey MD  10/11/22 8133

## 2022-10-14 ENCOUNTER — OFFICE VISIT (OUTPATIENT)
Dept: GASTROENTEROLOGY | Facility: CLINIC | Age: 32
End: 2022-10-14

## 2022-10-14 ENCOUNTER — OFFICE VISIT (OUTPATIENT)
Dept: FAMILY MEDICINE CLINIC | Facility: CLINIC | Age: 32
End: 2022-10-14

## 2022-10-14 VITALS
HEIGHT: 71 IN | DIASTOLIC BLOOD PRESSURE: 80 MMHG | BODY MASS INDEX: 31.08 KG/M2 | WEIGHT: 222 LBS | SYSTOLIC BLOOD PRESSURE: 148 MMHG

## 2022-10-14 VITALS
DIASTOLIC BLOOD PRESSURE: 84 MMHG | HEIGHT: 71 IN | BODY MASS INDEX: 31.64 KG/M2 | SYSTOLIC BLOOD PRESSURE: 143 MMHG | WEIGHT: 226 LBS | HEART RATE: 96 BPM

## 2022-10-14 DIAGNOSIS — M79.10 MUSCLE ACHE: ICD-10-CM

## 2022-10-14 DIAGNOSIS — M25.50 ARTHRALGIA, UNSPECIFIED JOINT: Primary | ICD-10-CM

## 2022-10-14 DIAGNOSIS — U09.9 POST-COVID-19 CONDITION: ICD-10-CM

## 2022-10-14 DIAGNOSIS — F12.90 MARIJUANA USE: Chronic | ICD-10-CM

## 2022-10-14 DIAGNOSIS — R19.7 DIARRHEA, UNSPECIFIED TYPE: ICD-10-CM

## 2022-10-14 DIAGNOSIS — R10.84 GENERALIZED ABDOMINAL PAIN: Primary | ICD-10-CM

## 2022-10-14 DIAGNOSIS — R63.4 WEIGHT LOSS: Primary | ICD-10-CM

## 2022-10-14 DIAGNOSIS — G47.429 NARCOLEPSY DUE TO UNDERLYING CONDITION WITHOUT CATAPLEXY: Chronic | ICD-10-CM

## 2022-10-14 DIAGNOSIS — K62.5 HEMORRHAGE OF ANUS AND RECTUM: ICD-10-CM

## 2022-10-14 DIAGNOSIS — R06.00 DYSPNEA, UNSPECIFIED TYPE: ICD-10-CM

## 2022-10-14 DIAGNOSIS — R11.0 NAUSEA: ICD-10-CM

## 2022-10-14 DIAGNOSIS — I10 ESSENTIAL HYPERTENSION, BENIGN: Chronic | ICD-10-CM

## 2022-10-14 PROBLEM — J98.8 VIRAL RESPIRATORY ILLNESS: Status: RESOLVED | Noted: 2021-12-28 | Resolved: 2022-10-14

## 2022-10-14 PROBLEM — B97.89 VIRAL RESPIRATORY ILLNESS: Status: RESOLVED | Noted: 2021-12-28 | Resolved: 2022-10-14

## 2022-10-14 PROBLEM — U07.1 LAB TEST POSITIVE FOR DETECTION OF COVID-19 VIRUS: Status: RESOLVED | Noted: 2021-07-17 | Resolved: 2022-10-14

## 2022-10-14 LAB
T4 FREE SERPL-MCNC: 1.29 NG/DL (ref 0.93–1.7)
TSH SERPL DL<=0.05 MIU/L-ACNC: 1.41 UIU/ML (ref 0.27–4.2)

## 2022-10-14 PROCEDURE — 99204 OFFICE O/P NEW MOD 45 MIN: CPT | Performed by: INTERNAL MEDICINE

## 2022-10-14 PROCEDURE — 99214 OFFICE O/P EST MOD 30 MIN: CPT | Performed by: FAMILY MEDICINE

## 2022-10-14 RX ORDER — AMLODIPINE BESYLATE 10 MG/1
10 TABLET ORAL DAILY
Qty: 90 TABLET | Refills: 3 | Status: SHIPPED | OUTPATIENT
Start: 2022-10-14

## 2022-10-14 RX ORDER — DICYCLOMINE HCL 20 MG
20 TABLET ORAL EVERY 6 HOURS
Qty: 120 TABLET | Refills: 5 | Status: SHIPPED | OUTPATIENT
Start: 2022-10-14 | End: 2022-11-13

## 2022-10-14 RX ORDER — DEXTROSE AND SODIUM CHLORIDE 5; .45 G/100ML; G/100ML
30 INJECTION, SOLUTION INTRAVENOUS CONTINUOUS PRN
Status: CANCELLED | OUTPATIENT
Start: 2022-11-03

## 2022-10-14 NOTE — PROGRESS NOTES
Subjective   Roosevelt Ochoa is a 32 y.o. male.  Requesting evaluation of myalgias arthralgias muscle loss dyspnea constipation diagnoses below.  Seen in ER for same's concerns yesterday.  Pulmonary lab work chest x-ray all normal.  Thyroid added today was normal.  States has had COVID 3 times in the past year and a half.  States after last bout of COVID developed the symptoms.  Of note he is only lost about 6 pounds in the past year and a half.  Complaining more of muscle weakness fatigue symptoms as above.  Continues on blood pressure medicine although out of bed present.  Chart reviewed.  Already has appointment to see GI medicine today.  Continues on stimulant drugs for narcolepsy from provider in [place].  Drug screen noted.    History of Present Illness   HPI    The following portions of the patient's history were reviewed and updated as appropriate: allergies, current medications, past family history, past medical history, past social history, past surgical history and problem list.    Review of Systems  Review of Systems   Constitutional: Positive for fatigue. Negative for activity change, appetite change and unexpected weight change.   HENT: Negative for trouble swallowing and voice change.    Eyes: Negative for redness and visual disturbance.   Respiratory: Positive for shortness of breath. Negative for cough and wheezing.    Cardiovascular: Negative for chest pain and palpitations.   Gastrointestinal: Negative for abdominal pain, constipation, diarrhea, nausea and vomiting.   Genitourinary: Negative for urgency.   Musculoskeletal: Positive for arthralgias and myalgias. Negative for joint swelling.   Neurological: Negative for syncope and headaches.   Hematological: Negative for adenopathy.   Psychiatric/Behavioral: Negative for sleep disturbance.       Objective   Physical Exam  Physical Exam  Constitutional:       Appearance: Normal appearance. He is normal weight.   Musculoskeletal:       "Comments: Normal musculature to mild enlargement from working out.  No joint abnormalities no swelling   Neurological:      Mental Status: He is alert.   Psychiatric:         Mood and Affect: Affect is blunt.         Speech: Speech normal.         Behavior: Behavior normal.         Thought Content: Thought content normal.         Cognition and Memory: Cognition normal.       Results for orders placed or performed in visit on 10/14/22   T4, Free    Specimen: Blood   Result Value Ref Range    Free T4 1.29 0.93 - 1.70 ng/dL   TSH    Specimen: Blood   Result Value Ref Range    TSH 1.410 0.270 - 4.200 uIU/mL           Visit Vitals  /80   Ht 180.3 cm (71\")   Wt 101 kg (222 lb)   BMI 30.96 kg/m²     Body mass index is 30.96 kg/m².  /80   Ht 180.3 cm (71\")   Wt 101 kg (222 lb)   BMI 30.96 kg/m²       Assessment/Plan   Diagnoses and all orders for this visit:    1. Arthralgia, unspecified joint (Primary)  -     Ambulatory Referral to Rheumatology    2. Dyspnea, unspecified type  -     Ambulatory Referral to Pulmonology    3. Muscle ache  -     Ambulatory Referral to Rheumatology    4. Essential hypertension, benign  -     amLODIPine (NORVASC) 10 MG tablet; Take 1 tablet by mouth Daily.  Dispense: 90 tablet; Refill: 3    5. Marijuana use    6. 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  Counseled on findings previous.  Given history will probably need pulmonary function study to rule out post COVID syndrome as well as post COVID arthritis or other post COVID syndrome.  We will start off with pulmonary rheumatology evaluation.  Counseled on maintaining lifestyle as best can.  We will follow-up after studies.  "

## 2022-10-19 DIAGNOSIS — R06.09 OTHER FORM OF DYSPNEA: Primary | ICD-10-CM

## 2022-10-19 DIAGNOSIS — R06.09 POST-COVID CHRONIC DYSPNEA: ICD-10-CM

## 2022-10-19 DIAGNOSIS — U09.9 POST-COVID CHRONIC DYSPNEA: ICD-10-CM

## 2022-10-31 NOTE — PROGRESS NOTES
[facility]      Chief Complaint:   Chief Complaint   Patient presents with   • Follow-up   • Constipation   • Weight Loss   • Black or Bloody Stool       Subjective     HPI:   Mr. Ochoa is a 32-year-old -American male with past medical history of asthma, hyperglycemia, hypertension, hip pain, sickle cell disease, sleep apnea presenting for evaluation for abdominal pain.  He has intermittent bouts of generalized abdominal discomfort associated with nausea and vomiting for past several months.  He also has constipation alternating with diarrhea.  Pain is intermittent, sharp or aching, nonradiating and is unchanged with food intake, activity or defecation.  He lost few pounds weight which she was unable to quantify.  Had COVID infection last year.  Has intermittent bouts of rectal bleeding in small amounts for past few months.  Denied NSAID usage.  Seen at the emergency room and CT abdomen pelvis was unremarkable.  Currently taking Prilosec without much help    Past Medical History:   Past Medical History:   Diagnosis Date   • Asthma    • Blood chemistry abnormality    • Diarrhea    • Elevated blood pressure reading without diagnosis of hypertension    • Foot pain     probable tendonitis left foot      • Hip pain    • Hyperglycemia    • Hypertension    • Nausea and vomiting    • Sickle cell disease (HCC)    • Sleep apnea        Past Surgical History:  Past Surgical History:   Procedure Laterality Date   • DENTAL PROCEDURE     • EYE SURGERY         Family History:  Family History   Problem Relation Age of Onset   • Hypertension Mother    • Heart disease Other    • Diabetes Other        Social History:   reports that he has quit smoking. He has never used smokeless tobacco. He reports that he does not currently use alcohol after a past usage of about 24.0 standard drinks per week. He reports that he does not use drugs.    Medications:   Prior to Admission medications    Medication Sig  Start Date End Date Taking? Authorizing Provider   amLODIPine (NORVASC) 10 MG tablet Take 1 tablet by mouth Daily. 10/14/22  Yes Jhonathan Nielsen MD   amphetamine-dextroamphetamine (ADDERALL) 20 MG tablet Take 20 mg by mouth 3 (Three) Times a Day. 9/23/21  Yes Emergency, Nurse ROLANDA Barrett   amphetamine-dextroamphetamine XR (ADDERALL XR) 30 MG 24 hr capsule Take 1 capsule by mouth Daily For narcolepsy. 8/11/20  Yes Jacob Bland II, MD   armodafinil (NUVIGIL) 150 MG tablet Take 1 tablet by mouth Every Morning. 8/11/20  Yes Jacob Bland II, MD   Ca, Mg, K, and Na Oxybates 500 MG/ML solution Take 2.25 g by mouth. 7/19/21  Yes Emergency, Nurse ROLANDA Barrett   chlorthalidone (HYGROTON) 25 MG tablet Take 1 tablet by mouth Daily. 4/6/21  Yes Jhonathan Nielsen MD   ketorolac (TORADOL) 10 MG tablet Take 1 tablet by mouth Every 6 (Six) Hours As Needed for Moderate Pain . 7/13/22  Yes Ashley Abdi APRN   ondansetron ODT (ZOFRAN-ODT) 4 MG disintegrating tablet Place 1 tablet on the tongue Every 6 (Six) Hours As Needed for Nausea or Vomiting. 7/20/21  Yes Elfego Caldwell MD   pantoprazole (PROTONIX) 40 MG EC tablet Take 1 tablet by mouth Daily. 4/6/21  Yes Jhonathan Nielsen MD   Sodium Oxybate (XYREM PO) Take 60 mL by mouth 2 (two) times a day.   Yes ProviderMakeda MD   tobramycin 0.3 % solution ophthalmic solution Administer 2 drops to the right eye Every 4 (Four) Hours. 4/16/21  Yes Mara Borrero APRN   dicyclomine (BENTYL) 20 MG tablet Take 1 tablet by mouth Every 6 (Six) Hours for 30 days. 10/14/22 11/13/22  Jeanette Arauz MD       Allergies:  Lisinopril    ROS:    Review of Systems   Constitutional: Positive for unexpected weight change. Negative for chills, fatigue and fever.   HENT: Negative for congestion, ear discharge, hearing loss, nosebleeds and sore throat.    Eyes: Negative for pain, discharge and redness.   Respiratory: Negative for cough, chest tightness, shortness of breath and wheezing.   "  Cardiovascular: Negative for chest pain and palpitations.   Gastrointestinal: Positive for abdominal pain, blood in stool, constipation, diarrhea, nausea and vomiting. Negative for abdominal distention.   Endocrine: Negative for cold intolerance, polydipsia, polyphagia and polyuria.   Genitourinary: Negative for dysuria, flank pain, frequency, hematuria and urgency.   Musculoskeletal: Negative for arthralgias, back pain, joint swelling and myalgias.   Skin: Negative for color change, pallor and rash.   Neurological: Negative for tremors, seizures, syncope, weakness and headaches.   Hematological: Negative for adenopathy. Does not bruise/bleed easily.   Psychiatric/Behavioral: Negative for behavioral problems, confusion, dysphoric mood, hallucinations and suicidal ideas. The patient is not nervous/anxious.      Objective     /84 (BP Location: Left arm)   Pulse 96   Ht 180.3 cm (71\")   Wt 103 kg (226 lb)   BMI 31.52 kg/m²     Physical Exam  Constitutional:       Appearance: He is well-developed.   HENT:      Head: Normocephalic and atraumatic.   Eyes:      Conjunctiva/sclera: Conjunctivae normal.      Pupils: Pupils are equal, round, and reactive to light.   Neck:      Thyroid: No thyromegaly.   Cardiovascular:      Rate and Rhythm: Normal rate and regular rhythm.      Heart sounds: Normal heart sounds. No murmur heard.  Pulmonary:      Effort: Pulmonary effort is normal.      Breath sounds: Normal breath sounds. No wheezing.   Abdominal:      General: Bowel sounds are normal. There is no distension.      Palpations: Abdomen is soft. There is no mass.      Tenderness: There is no abdominal tenderness.      Hernia: No hernia is present.   Genitourinary:     Comments: No lesions noted  Musculoskeletal:         General: No tenderness. Normal range of motion.      Cervical back: Normal range of motion and neck supple.   Lymphadenopathy:      Cervical: No cervical adenopathy.   Skin:     General: Skin is warm " and dry.      Findings: No rash.   Neurological:      Mental Status: He is alert and oriented to person, place, and time.      Cranial Nerves: No cranial nerve deficit.   Psychiatric:         Thought Content: Thought content normal.        Extremities: No edema, cyanosis or clubbing.    Assessment & Plan    1.  Abdominal pain with nausea and vomiting rule out peptic ulcer disease, gastritis and pancreaticobiliary pathology.  Continue Prilosec.  Add Zofran as needed.  Proceed with EGD for further evaluation.  2.  Abdominal pain with diarrhea alternating with constipation and weight loss rule out IBD and colorectal neoplasia.  Add high-fiber diet.  Add Bentyl as needed.  Proceed with colonoscopy for further evaluation.  3.  Rectal bleeding, likely hemorrhoidal.  Need to rule out neoplasia and IBD.  Add Anusol suppository as needed.  Proceed with colonoscopy for further evaluation.  4.  Obesity, recommend exercise and diet control.  Diagnoses and all orders for this visit:    1. Generalized abdominal pain (Primary)  -     Case Request; Standing  -     dextrose 5 % and sodium chloride 0.45 % infusion  -     Case Request    2. Diarrhea, unspecified type  -     Case Request; Standing  -     dextrose 5 % and sodium chloride 0.45 % infusion  -     Case Request    3. Nausea  -     Case Request; Standing  -     dextrose 5 % and sodium chloride 0.45 % infusion  -     Case Request    4. Hemorrhage of anus and rectum  -     Case Request; Standing  -     dextrose 5 % and sodium chloride 0.45 % infusion  -     Case Request    Other orders  -     dicyclomine (BENTYL) 20 MG tablet; Take 1 tablet by mouth Every 6 (Six) Hours for 30 days.  Dispense: 120 tablet; Refill: 5  -     Follow Anesthesia Guidelines / Standing Orders; Future  -     Obtain Informed Consent; Future  -     Implement Anesthesia Orders Day of Procedure; Standing  -     Obtain Informed Consent; Standing  -     POC Glucose Once; Standing  -     Insert Peripheral IV;  Standing        ESOPHAGOGASTRODUODENOSCOPY (N/A), COLONOSCOPY (N/A)     Diagnosis Plan   1. Generalized abdominal pain  Case Request    dextrose 5 % and sodium chloride 0.45 % infusion    Case Request      2. Diarrhea, unspecified type  Case Request    dextrose 5 % and sodium chloride 0.45 % infusion    Case Request      3. Nausea  Case Request    dextrose 5 % and sodium chloride 0.45 % infusion    Case Request      4. Hemorrhage of anus and rectum  Case Request    dextrose 5 % and sodium chloride 0.45 % infusion    Case Request          Anticipated Surgical Procedure:  Orders Placed This Encounter   Procedures   • Follow Anesthesia Guidelines / Standing Orders     Standing Status:   Future   • Obtain Informed Consent     Standing Status:   Future     Order Specific Question:   Informed Consent Given For     Answer:   egd and colonoscopy       The risks, benefits, and alternatives of this procedure have been discussed with the patient or the responsible party- the patient understands and agrees to proceed.            This document has been electronically signed by Jeanette Arauz MD on October 31, 2022 07:51 CDT

## 2022-11-03 ENCOUNTER — ANESTHESIA EVENT (OUTPATIENT)
Dept: GASTROENTEROLOGY | Facility: HOSPITAL | Age: 32
End: 2022-11-03

## 2022-11-03 ENCOUNTER — HOSPITAL ENCOUNTER (OUTPATIENT)
Facility: HOSPITAL | Age: 32
Setting detail: HOSPITAL OUTPATIENT SURGERY
Discharge: HOME | End: 2022-11-03
Attending: INTERNAL MEDICINE | Admitting: INTERNAL MEDICINE

## 2022-11-03 ENCOUNTER — ANESTHESIA (OUTPATIENT)
Dept: GASTROENTEROLOGY | Facility: HOSPITAL | Age: 32
End: 2022-11-03

## 2022-11-03 VITALS
DIASTOLIC BLOOD PRESSURE: 69 MMHG | HEIGHT: 71 IN | RESPIRATION RATE: 16 BRPM | SYSTOLIC BLOOD PRESSURE: 114 MMHG | HEART RATE: 72 BPM | TEMPERATURE: 98.1 F | WEIGHT: 227 LBS | BODY MASS INDEX: 31.78 KG/M2 | OXYGEN SATURATION: 97 %

## 2022-11-03 DIAGNOSIS — K62.5 HEMORRHAGE OF ANUS AND RECTUM: ICD-10-CM

## 2022-11-03 DIAGNOSIS — R10.84 GENERALIZED ABDOMINAL PAIN: ICD-10-CM

## 2022-11-03 DIAGNOSIS — R11.0 NAUSEA: ICD-10-CM

## 2022-11-03 DIAGNOSIS — R19.7 DIARRHEA, UNSPECIFIED TYPE: ICD-10-CM

## 2022-11-03 PROCEDURE — 43239 EGD BIOPSY SINGLE/MULTIPLE: CPT | Performed by: INTERNAL MEDICINE

## 2022-11-03 PROCEDURE — 25010000002 PROPOFOL 10 MG/ML EMULSION: Performed by: NURSE ANESTHETIST, CERTIFIED REGISTERED

## 2022-11-03 PROCEDURE — 45380 COLONOSCOPY AND BIOPSY: CPT | Performed by: INTERNAL MEDICINE

## 2022-11-03 RX ORDER — DEXTROSE AND SODIUM CHLORIDE 5; .45 G/100ML; G/100ML
30 INJECTION, SOLUTION INTRAVENOUS CONTINUOUS PRN
Status: DISCONTINUED | OUTPATIENT
Start: 2022-11-03 | End: 2022-11-03 | Stop reason: HOSPADM

## 2022-11-03 RX ORDER — PROPOFOL 10 MG/ML
VIAL (ML) INTRAVENOUS AS NEEDED
Status: DISCONTINUED | OUTPATIENT
Start: 2022-11-03 | End: 2022-11-03 | Stop reason: SURG

## 2022-11-03 RX ADMIN — PROPOFOL 50 MG: 10 INJECTION, EMULSION INTRAVENOUS at 14:14

## 2022-11-03 RX ADMIN — DEXTROSE AND SODIUM CHLORIDE 30 ML/HR: 5; 450 INJECTION, SOLUTION INTRAVENOUS at 13:08

## 2022-11-03 RX ADMIN — PROPOFOL 50 MG: 10 INJECTION, EMULSION INTRAVENOUS at 14:12

## 2022-11-03 RX ADMIN — PROPOFOL 30 MG: 10 INJECTION, EMULSION INTRAVENOUS at 14:19

## 2022-11-03 RX ADMIN — PROPOFOL 200 MG: 10 INJECTION, EMULSION INTRAVENOUS at 14:11

## 2022-11-03 RX ADMIN — PROPOFOL 50 MG: 10 INJECTION, EMULSION INTRAVENOUS at 14:16

## 2022-11-03 NOTE — ANESTHESIA PREPROCEDURE EVALUATION
Anesthesia Evaluation     NPO Solid Status: > 8 hours  NPO Liquid Status: > 2 hours           Airway   Mallampati: II  TM distance: >3 FB  Neck ROM: full  no difficulty expected  Dental - normal exam     Pulmonary - normal exam   (+) asthma,sleep apnea,   Cardiovascular - normal exam    (+) hypertension,       Neuro/Psych  GI/Hepatic/Renal/Endo    (+)  GERD,      Musculoskeletal     Abdominal    Substance History      OB/GYN          Other                        Anesthesia Plan    ASA 2     general     intravenous induction     Anesthetic plan, risks, benefits, and alternatives have been provided, discussed and informed consent has been obtained with: patient.        CODE STATUS:

## 2022-11-03 NOTE — ANESTHESIA POSTPROCEDURE EVALUATION
Patient: Roosevelt Ochoa    Procedure Summary     Date: 11/03/22 Room / Location: [facility] ENDOSCOPY 2 / [facility] ENDOSCOPY    Anesthesia Start: 1405 Anesthesia Stop: 1423    Procedures:       ESOPHAGOGASTRODUODENOSCOPY      COLONOSCOPY Diagnosis:       Generalized abdominal pain      Diarrhea, unspecified type      Nausea      Hemorrhage of anus and rectum      (Generalized abdominal pain [R10.84])      (Diarrhea, unspecified type [R19.7])      (Nausea [R11.0])      (Hemorrhage of anus and rectum [K62.5])    Surgeons: Jeanette Arauz MD Provider: Abhijit Bui CRNA    Anesthesia Type: general ASA Status: 2          Anesthesia Type: general    Vitals  No vitals data found for the desired time range.          Post Anesthesia Care and Evaluation    Patient location during evaluation: bedside  Patient participation: waiting for patient participation  Level of consciousness: responsive to verbal stimuli  Pain management: adequate    Airway patency: patent  Anesthetic complications: No anesthetic complications  PONV Status: none  Cardiovascular status: acceptable  Respiratory status: acceptable  Hydration status: acceptable    Comments: ---------------------------               11/03/22                      1300         ---------------------------   BP:          142/79         Pulse:         69           Resp:          18           Temp:   97.7 °F (36.5 °C)   SpO2:         100%         ---------------------------

## 2022-11-04 ENCOUNTER — PROCEDURE VISIT (OUTPATIENT)
Dept: PULMONOLOGY | Facility: CLINIC | Age: 32
End: 2022-11-04

## 2022-11-04 ENCOUNTER — OFFICE VISIT (OUTPATIENT)
Dept: PULMONOLOGY | Facility: CLINIC | Age: 32
End: 2022-11-04

## 2022-11-04 VITALS
DIASTOLIC BLOOD PRESSURE: 80 MMHG | BODY MASS INDEX: 31.78 KG/M2 | HEART RATE: 67 BPM | SYSTOLIC BLOOD PRESSURE: 118 MMHG | HEIGHT: 71 IN | WEIGHT: 227 LBS | OXYGEN SATURATION: 99 %

## 2022-11-04 DIAGNOSIS — R06.09 OTHER FORM OF DYSPNEA: ICD-10-CM

## 2022-11-04 DIAGNOSIS — R06.09 DOE (DYSPNEA ON EXERTION): Primary | ICD-10-CM

## 2022-11-04 DIAGNOSIS — U09.9 POST-COVID CHRONIC DYSPNEA: ICD-10-CM

## 2022-11-04 DIAGNOSIS — R06.09 POST-COVID CHRONIC DYSPNEA: ICD-10-CM

## 2022-11-04 PROBLEM — J45.909 ASTHMA: Status: RESOLVED | Noted: 2022-11-04 | Resolved: 2022-11-04

## 2022-11-04 PROBLEM — J45.909 ASTHMA: Status: ACTIVE | Noted: 2022-11-04

## 2022-11-04 PROCEDURE — 99214 OFFICE O/P EST MOD 30 MIN: CPT | Performed by: NURSE PRACTITIONER

## 2022-11-04 PROCEDURE — 94010 BREATHING CAPACITY TEST: CPT | Performed by: NURSE PRACTITIONER

## 2022-11-04 PROCEDURE — 94727 GAS DIL/WSHOT DETER LNG VOL: CPT | Performed by: INTERNAL MEDICINE

## 2022-11-04 PROCEDURE — 94010 BREATHING CAPACITY TEST: CPT | Performed by: INTERNAL MEDICINE

## 2022-11-04 PROCEDURE — 94727 GAS DIL/WSHOT DETER LNG VOL: CPT | Performed by: NURSE PRACTITIONER

## 2022-11-04 NOTE — PROGRESS NOTES
"    Pulmonary Office Visit    Jhonathan Nielsen MD    Thank you for asking me to see Roosevelt Ochoa for   Chief Complaint   Patient presents with   • dyspnea     After having covid   .      History of Present Illness  Mr. Ochoa is a 32 year old patient who is referred from PCP for asthma. He has had worsening dyspnea since david covid the last time in 2022. He is not vaccinated. He does have narcolepsy for which he follows routinely with Dr. Etienne in [place]. 2021 was the worst. He got the infusion then.     2.5 years, dyspnea from covid. He was never hospitalized. He has not been on oxygen. He had an albuterol inhaler for a while but does not use it regularly.     He will get out of breath at the hope of 18 stairs. 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 He feels \"off\" since his covid.     Was not premature. From here. No travel.     He does exercise with free weights. He will ride a bike for cardio.     Siblings and cousins have asthma.     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   Prior medication use: albuterol.  Inhaled steorid.   Rescue inhaler use:  none  ER visits/hospitalization/exacerbations at PCP/[facility]: none    Tobacco use history:  Social History     Socioeconomic History   • Marital status: Single   Tobacco Use   • Smoking status: Former     Types: Cigars     Start date:      Quit date:      Years since quittin.8   • Smokeless tobacco: Never   Vaping Use   • Vaping Use: Never used   Substance and Sexual Activity   • Alcohol use: Not Currently     Comment: occasional   • Drug use: No     Types: Marijuana   • Sexual activity: Defer         Review of Systems: History obtained from chart review and the patient.  Review of Systems   Constitutional: Negative for diaphoresis, fatigue, fever and unexpected weight change.   Respiratory: Negative for cough, chest tightness, shortness of breath and wheezing.    Cardiovascular: Negative for chest pain, palpitations " and leg swelling.   Gastrointestinal: Negative for abdominal distention and blood in stool.   Genitourinary: Negative for hematuria.   Musculoskeletal: Negative for arthralgias and myalgias.   Skin: Negative for pallor and rash.   Neurological: Negative for dizziness, syncope and weakness.   Hematological: Does not bruise/bleed easily.   Psychiatric/Behavioral: Negative for confusion. The patient is not nervous/anxious.        As described in the HPI. Otherwise, remainder of ROS (14 systems) were negative.    Patient Active Problem List   Diagnosis   • Marijuana use   • Class 1 obesity due to excess calories with serious comorbidity and body mass index (BMI) of 33.0 to 33.9 in adult   • Essential hypertension, benign   • History of retinal tear   • Gastroesophageal reflux disease without esophagitis   • Narcolepsy due to underlying condition without cataplexy   • COVID-19   • Generalized abdominal pain   • Diarrhea   • Nausea   • Hemorrhage of anus and rectum   • THAKUR (dyspnea on exertion)         Current Outpatient Medications:   •  amLODIPine (NORVASC) 10 MG tablet, Take 1 tablet by mouth Daily., Disp: 90 tablet, Rfl: 3  •  amphetamine-dextroamphetamine (ADDERALL) 20 MG tablet, Take 20 mg by mouth 3 (Three) Times a Day., Disp: , Rfl:   •  amphetamine-dextroamphetamine XR (ADDERALL XR) 30 MG 24 hr capsule, Take 1 capsule by mouth Daily For narcolepsy., Disp: 30 capsule, Rfl: 0  •  armodafinil (NUVIGIL) 150 MG tablet, Take 1 tablet by mouth Every Morning., Disp: 30 tablet, Rfl: 0  •  Ca, Mg, K, and Na Oxybates 500 MG/ML solution, Take 2.25 g by mouth., Disp: , Rfl:   •  chlorthalidone (HYGROTON) 25 MG tablet, Take 1 tablet by mouth Daily., Disp: 90 tablet, Rfl: 3  •  dicyclomine (BENTYL) 20 MG tablet, Take 1 tablet by mouth Every 6 (Six) Hours for 30 days., Disp: 120 tablet, Rfl: 5  •  ketorolac (TORADOL) 10 MG tablet, Take 1 tablet by mouth Every 6 (Six) Hours As Needed for Moderate Pain ., Disp: 20 tablet, Rfl:  "0  •  ondansetron ODT (ZOFRAN-ODT) 4 MG disintegrating tablet, Place 1 tablet on the tongue Every 6 (Six) Hours As Needed for Nausea or Vomiting., Disp: 15 tablet, Rfl: 0  •  pantoprazole (PROTONIX) 40 MG EC tablet, Take 1 tablet by mouth Daily., Disp: 90 tablet, Rfl: 1  •  Sodium Oxybate (XYREM PO), Take 60 mL by mouth 2 (two) times a day., Disp: , Rfl:   •  tobramycin 0.3 % solution ophthalmic solution, Administer 2 drops to the right eye Every 4 (Four) Hours., Disp: 5 mL, Rfl: 0  No current facility-administered medications for this visit.    Allergies   Allergen Reactions   • Lisinopril Anaphylaxis       Past Medical History:   Diagnosis Date   • Asthma    • Blood chemistry abnormality    • Diarrhea    • Elevated blood pressure reading without diagnosis of hypertension    • Foot pain     probable tendonitis left foot      • Hip pain    • Hyperglycemia    • Hypertension    • Nausea and vomiting    • Sickle cell disease (HCC)    • Sleep apnea      Past Surgical History:   Procedure Laterality Date   • DENTAL PROCEDURE     • EYE SURGERY       Social History     Socioeconomic History   • Marital status: Single   Tobacco Use   • Smoking status: Former     Types: Cigars     Start date:      Quit date:      Years since quittin.8   • Smokeless tobacco: Never   Vaping Use   • Vaping Use: Never used   Substance and Sexual Activity   • Alcohol use: Not Currently     Comment: occasional   • Drug use: No     Types: Marijuana   • Sexual activity: Defer     Family History   Problem Relation Age of Onset   • Hypertension Mother    • Heart disease Other    • Diabetes Other        Advance Care Planning   ACP discussion was declined by the patient. Patient does not have an advance directive, declines further assistance.          Objective     /80   Pulse 67   Ht 180.3 cm (71\")   Wt 103 kg (227 lb)   SpO2 99%   BMI 31.66 kg/m²       BMI is >= 30 and <35. (Class 1 Obesity). The following options were offered " after discussion;: nutrition counseling/recommendations      Physical Exam  Vitals and nursing note reviewed.   Constitutional:       General: He is not in acute distress.     Appearance: He is well-developed. He is not diaphoretic.   HENT:      Head: Normocephalic.   Eyes:      Conjunctiva/sclera: Conjunctivae normal.   Neck:      Vascular: No JVD.   Cardiovascular:      Rate and Rhythm: Normal rate and regular rhythm.      Heart sounds: Normal heart sounds, S1 normal and S2 normal. No murmur heard.    No friction rub. No gallop.   Pulmonary:      Effort: Pulmonary effort is normal. No respiratory distress.      Breath sounds: Normal breath sounds. No wheezing or rales.   Abdominal:      General: Bowel sounds are normal. There is no distension.      Palpations: Abdomen is soft.   Musculoskeletal:         General: Normal range of motion.   Skin:     General: Skin is warm and dry.      Findings: No erythema.   Neurological:      Mental Status: He is alert and oriented to person, place, and time.   Psychiatric:         Behavior: Behavior normal.         Thought Content: Thought content normal.         Judgment: Judgment normal.         PFTs          PFTs: Done on: 11/4/22 (independently reviewed by myself, interpreted by physician)  Ratio 78  FVC 99  FEV1 95  TLC 86        Radiology (independently reviewed by me, interpreted by physcian)    CT Abdomen Pelvis With Contrast    Result Date: 10/11/2022  1.  No acute intra-abdominal or pelvic abnormality. 2.  Small fat-containing umbilical hernia. Electronically signed by:  Lu Mcfarland MD  10/11/2022 9:25 PM CDT Workstation: [id number]         Assessment     Discussion/ Recommendations:    Diagnosis Plan   1. THAKUR (dyspnea on exertion)  No evidence of asthma on PFT or by history.   Shortness of breath does not appear to be from a pulmonary source, but mild deconditioning.     He is normally a very active individual who has had several illnesses. This has taken a toll  on his exercise regimen.     He will get back to cardio and weight lifting.     There is nothing sinister in his history, story, or exam to suspect any underlying long covid symptoms.             Follow up: with PCP.       I spent 35 minutes caring for Roosevelt on this date of service. This time includes time spent by me in the following activities: preparing for the visit, reviewing tests, obtaining and/or reviewing a separately obtained history, performing a medically appropriate examination and/or evaluation, counseling and educating the patient/family/caregiver, ordering medications, tests, or procedures, referring and communicating with other health care professionals, documenting information in the medical record, independently interpreting results and communicating that information with the patient/family/caregiver and care coordination            This document has been electronically signed by LYNNETTE Joel on November 4, 2022 14:50 CDT

## 2022-11-04 NOTE — PROGRESS NOTES
BASIC SPIROMETRY WITH LUNG VOLUMES PERFORMED.     GOOD PATIENT EFFORT AND COOPERATION.     6 SECOND EXHALATION ON SPIROMETRY.     ORDERED BY LYNNETTE WILLS; READ BY DR. ADEEL ASTORGA.

## 2022-11-07 LAB — REF LAB TEST METHOD: NORMAL

## 2022-11-14 NOTE — PROCEDURES
Full Pulmonary Function Test With Bronchodilator  Performed by: Destiny Albert APRN  Authorized by: Destiny Albert APRN      Pre Drug % Predicted    FVC: 99%   FEV1: 95%   FEF 25-75%: 77%   FEV1/FVC: 78%   T%   RV: 94%    Interpretation   Spirometry There is reduced midflow suggesting small airway/airflow obstruction.   Overall comments: The patient's spirometry reveals a very mild decrease in midflows and otherwise is within normal limits.  Lung volumes reveal a mild decrease in functional residual capacity and otherwise are within normal limits.

## 2022-11-30 ENCOUNTER — OFFICE VISIT (OUTPATIENT)
Dept: GASTROENTEROLOGY | Facility: CLINIC | Age: 32
End: 2022-11-30

## 2022-11-30 VITALS
BODY MASS INDEX: 32.26 KG/M2 | SYSTOLIC BLOOD PRESSURE: 159 MMHG | WEIGHT: 230.4 LBS | HEIGHT: 71 IN | HEART RATE: 73 BPM | DIASTOLIC BLOOD PRESSURE: 91 MMHG

## 2022-11-30 DIAGNOSIS — R10.84 GENERALIZED ABDOMINAL PAIN: Primary | ICD-10-CM

## 2022-11-30 PROCEDURE — 99213 OFFICE O/P EST LOW 20 MIN: CPT | Performed by: INTERNAL MEDICINE

## 2022-11-30 RX ORDER — SUCRALFATE 1 G/1
1 TABLET ORAL 4 TIMES DAILY
Qty: 120 TABLET | Refills: 4 | Status: SHIPPED | OUTPATIENT
Start: 2022-11-30 | End: 2022-12-30

## 2022-12-19 NOTE — PROGRESS NOTES
Chief Complaint   Patient presents with   • endo follow up        Subjective    Roosevelt Ochoa is a 32 y.o. male.    History of Present Illness  Patient presented to GI clinic for follow-up visit today.  He has intermittent symptoms with abdominal pain and denies nausea or vomiting.  Bowel movements are regular.  Gained 4 pounds weight since last visit.  EGD was consistent with hiatal hernia, esophagitis and gastritis.  Path was consistent with reactive gastropathy.  Colonoscopy was consistent with hemorrhoids.       The following portions of the patient's history were reviewed and updated as appropriate:   Past Medical History:   Diagnosis Date   • Asthma    • Blood chemistry abnormality    • Diarrhea    • Elevated blood pressure reading without diagnosis of hypertension    • Foot pain     probable tendonitis left foot      • Hip pain    • Hyperglycemia    • Hypertension    • Nausea and vomiting    • Sickle cell disease (HCC)    • Sleep apnea      Past Surgical History:   Procedure Laterality Date   • COLONOSCOPY N/A 11/3/2022    Procedure: COLONOSCOPY;  Surgeon: Jeanette Arauz MD;  Location: [facility] ENDOSCOPY;  Service: Gastroenterology;  Laterality: N/A;   • DENTAL PROCEDURE     • ENDOSCOPY N/A 11/3/2022    Procedure: ESOPHAGOGASTRODUODENOSCOPY;  Surgeon: Jeanette Arauz MD;  Location: [facility] ENDOSCOPY;  Service: Gastroenterology;  Laterality: N/A;   • EYE SURGERY       Family History   Problem Relation Age of Onset   • Hypertension Mother    • Heart disease Other    • Diabetes Other        Prior to Admission medications    Medication Sig Start Date End Date Taking? Authorizing Provider   amLODIPine (NORVASC) 10 MG tablet Take 1 tablet by mouth Daily. 10/14/22  Yes Jhonathan Nielsen MD   amphetamine-dextroamphetamine (ADDERALL) 20 MG tablet Take 20 mg by mouth 3 (Three) Times a Day. 9/23/21  Yes Emergency, Nurse ROLANDA Barrett   amphetamine-dextroamphetamine XR (ADDERALL XR) 30 MG 24 hr capsule Take 1  capsule by mouth Daily For narcolepsy. 20  Yes Jacob Bland II, MD   armodafinil (NUVIGIL) 150 MG tablet Take 1 tablet by mouth Every Morning. 20  Yes Jacob Bland II, MD   Ca, Mg, K, and Na Oxybates 500 MG/ML solution Take 2.25 g by mouth. 21  Yes Emergency, Nurse Nena, ROLANDA   chlorthalidone (HYGROTON) 25 MG tablet Take 1 tablet by mouth Daily. 21  Yes Jhonathan Nielsen MD   ketorolac (TORADOL) 10 MG tablet Take 1 tablet by mouth Every 6 (Six) Hours As Needed for Moderate Pain . 22  Yes Ashley Abdi APRN   ondansetron ODT (ZOFRAN-ODT) 4 MG disintegrating tablet Place 1 tablet on the tongue Every 6 (Six) Hours As Needed for Nausea or Vomiting. 21  Yes Elfego Caldwell MD   pantoprazole (PROTONIX) 40 MG EC tablet Take 1 tablet by mouth Daily. 21  Yes Jhonathan Nielsen MD   Sodium Oxybate (XYREM PO) Take 60 mL by mouth 2 (two) times a day.   Yes Makeda Howe MD   tobramycin 0.3 % solution ophthalmic solution Administer 2 drops to the right eye Every 4 (Four) Hours. 21  Yes Mara Borrero APRN   sucralfate (Carafate) 1 g tablet Take 1 tablet by mouth 4 (Four) Times a Day for 30 days. 22  Jeanette Arauz MD     Allergies   Allergen Reactions   • Lisinopril Anaphylaxis     Social History     Socioeconomic History   • Marital status: Single   Tobacco Use   • Smoking status: Former     Types: Cigars     Start date:      Quit date: 2015     Years since quittin.9   • Smokeless tobacco: Never   Vaping Use   • Vaping Use: Never used   Substance and Sexual Activity   • Alcohol use: Not Currently     Comment: occasional   • Drug use: No     Types: Marijuana   • Sexual activity: Defer       Review of Systems  Review of Systems   Constitutional: Negative for chills, fatigue, fever and unexpected weight change.   HENT: Negative for congestion, ear discharge, hearing loss, nosebleeds and sore throat.    Eyes: Negative for pain, discharge and  "redness.   Respiratory: Negative for cough, chest tightness, shortness of breath and wheezing.    Cardiovascular: Negative for chest pain and palpitations.   Gastrointestinal: Positive for abdominal pain. Negative for abdominal distention, blood in stool, constipation, diarrhea, nausea and vomiting.   Endocrine: Negative for cold intolerance, polydipsia, polyphagia and polyuria.   Genitourinary: Negative for dysuria, flank pain, frequency, hematuria and urgency.   Musculoskeletal: Negative for arthralgias, back pain, joint swelling and myalgias.   Skin: Negative for color change, pallor and rash.   Neurological: Negative for tremors, seizures, syncope, weakness and headaches.   Hematological: Negative for adenopathy. Does not bruise/bleed easily.   Psychiatric/Behavioral: Negative for behavioral problems, confusion, dysphoric mood, hallucinations and suicidal ideas. The patient is not nervous/anxious.         /91 (BP Location: Right arm)   Pulse 73   Ht 180.3 cm (71\")   Wt 105 kg (230 lb 6.4 oz)   BMI 32.13 kg/m²     Objective    Physical Exam  Constitutional:       Appearance: He is well-developed.   HENT:      Head: Normocephalic and atraumatic.   Eyes:      Conjunctiva/sclera: Conjunctivae normal.      Pupils: Pupils are equal, round, and reactive to light.   Neck:      Thyroid: No thyromegaly.   Cardiovascular:      Rate and Rhythm: Normal rate and regular rhythm.      Heart sounds: Normal heart sounds. No murmur heard.  Pulmonary:      Effort: Pulmonary effort is normal.      Breath sounds: Normal breath sounds. No wheezing.   Abdominal:      General: Bowel sounds are normal. There is no distension.      Palpations: Abdomen is soft. There is no mass.      Tenderness: There is no abdominal tenderness.      Hernia: No hernia is present.   Genitourinary:     Comments: No lesions noted  Musculoskeletal:         General: No tenderness. Normal range of motion.      Cervical back: Normal range of motion and " neck supple.   Lymphadenopathy:      Cervical: No cervical adenopathy.   Skin:     General: Skin is warm and dry.      Findings: No rash.   Neurological:      Mental Status: He is alert and oriented to person, place, and time.      Cranial Nerves: No cranial nerve deficit.   Psychiatric:         Thought Content: Thought content normal.       Admission on 2022, Discharged on 2022   Component Date Value Ref Range Status   • Reference Lab Report 2022    Final                    Value:[facility]  [address] [place] [address]  Phone: [phone] or [phone]  Fax: [phone]  Alec Castillo M.D., Medical Director    PATIENT NAME                           LABORATORY NO.  [address]  TOYIN NEELY               [id number]  [id number]                         AGE              SEX  SSN           CLIENT REF #  [facility]           32      1990      [id number]   [id number]    [place]                       REQUESTING M.D.     ATTENDING M.D.     COPY TO[address]  DATE COLLECTED      DATE RECEIVED      DATE REPORTED  2022    DIAGNOSIS:  A.   SMALL BOWEL, BIOPSY:  No significant histologic abnormality  B.   ANTRUM, BIOPSY:  Reactive gastropathy  C.   GE JUNCTION:  Reactive gastric and squamous mucosa  Negative for specialized Bowling's mucosa  D.                             TERMINAL ILEUM BIOPSY:  No significant histologic abnormality  E.   COLON, BIOPSY, MUCOSA:  No significant histologic abnormality    JBS/sm    CLINICAL HISTORY:  Generalized abdominal pain, diarrhea, nausea, hemorrhage of anus and rectum    SPECIMENS RECEIVED:  A.  SMALL BOWEL, BIOPSY  B.  ANTRUM, BIOPSY  C.  GE JUNCTION  D.  TERMINAL ILEUM BIOPSY  E.  COLON, BIOPSY, MUCOSA    MICROSCOPIC DESCRIPTION:  Tissue blocks  "are prepared and slides are examined microscopically on all  specimens. See diagnosis for details.    Professional interpretation rendered by Boby Rowe M.D. at [facility],  [employer], [address] [place] [address].    GROSS DESCRIPTION:  A.  Specimen is received in 1 formalin filled container \"small bowel biopsy\"  and consists of 2 pieces of tan soft tissue measuring 0.4 x 0.3 x 0.2 cm.  Specimen is submitted entirely in 1 cassette.  B.  Specimen is received in 1 formalin filled container \"antrum biopsy\" and  consists of 2 pieces of tan soft tissue measuring                           0.6 x 0.4 x 0.2 cm.  Specimen is submitted entirely in 1 cassette.  C.  Specimen is received in 1 formalin filled container \"GE junction\" and  consists of a single piece of tan soft tissue measuring 0.3 x 0.2 x 0.2 cm.  Specimen submitted entirely in 1 cassette.  D.  Specimen is received in 1 formalin filled container \"terminal ileum biopsy\"  and consists of 2 pieces of tan soft tissue measuring 0.4 x 0.3 x 0.2 cm.  Specimen is submitted entirely in 1 cassette.  E.  Specimen is received in 1 formalin filled container \"polyp mucosa biopsy\"  consists of 2 pieces of tan soft tissue measuring 0.4 x 0.3 x 0.2 cm.  Specimen submitted entirely in 1 cassette.  MM    REVIEWED, DIAGNOSED AND ELECTRONICALLY  SIGNED BY:    Boby Rowe M.D.  CPT CODES:  88305x5       Assessment & Plan    No diagnosis found..   1.  Abdominal pain with nausea and vomiting, improving.  Has intermittent symptoms likely due to reactive gastropathy.  Continue Prilosec and Zofran.  Add Carafate daily.  2.  Abdominal pain with diarrhea alternating with constipation and weight loss, well controlled.  Continue high-fiber diet and Bentyl.  3.  Rectal bleeding, resolved.  Continue Anusol suppository as needed.  Sigmoidoscopy with banding if continues.  4.  Obesity, recommend exercise and diet control.    Orders placed during this encounter include:  No " orders of the defined types were placed in this encounter.      * Surgery not found *    Review and/or summary of lab tests, radiology, procedures, medications. Review and summary of old records and obtaining of history. The risks and benefits of my recommendations, as well as other treatment options were discussed with the patient today. Questions were answered.    New Medications Ordered This Visit   Medications   • sucralfate (Carafate) 1 g tablet     Sig: Take 1 tablet by mouth 4 (Four) Times a Day for 30 days.     Dispense:  120 tablet     Refill:  4       Follow-up: Return in about 1 month (around 2022).               Results for orders placed or performed during the hospital encounter of 22   TISSUE EXAM, [facility] ([facility])    Specimen: A: Small Intestine, Duodenum; Tissue    B: Gastric, Antrum; Tissue    C: Esophagus; Tissue    D: Small Intestine, Ileum; Tissue    E: Large Intestine; Tissue   Result Value Ref Range    Reference Lab Report       [facility]  [address] [place] [address]  Phone: [phone] or [phone]  Fax: [phone]  Alec Castillo M.D., Medical Director    PATIENT NAME                           LABORATORY NO.  1800  TOYIN NEELY               [id number]  [id number]                         AGE              SEX  N           CLIENT REF #  [facility]           32      1990      [id number]   [id number]    [place]                       REQUESTING M.D.     ATTENDING M.D.     COPY TO[address]  DATE COLLECTED      DATE RECEIVED      DATE REPORTED  2022    DIAGNOSIS:  A.   SMALL BOWEL, BIOPSY:  No significant histologic abnormality  B.   ANTRUM, BIOPSY:  Reactive gastropathy  C.   GE JUNCTION:  Reactive gastric and squamous mucosa  Negative for  "specialized Bowling's mucosa  D.    TERMINAL ILEUM BIOPSY:  No significant histologic abnormality  E.   COLON, BIOPSY, MUCOSA:  No significant histologic abnormality    JBS/sm    CLINICAL HISTORY:  Generalized abdominal pain, diarrhea, nausea, hemorrhage of anus and rectum    SPECIMENS RECEIVED:  A.  SMALL BOWEL, BIOPSY  B.  ANTRUM, BIOPSY  C.  GE JUNCTION  D.  TERMINAL ILEUM BIOPSY  E.  COLON, BIOPSY, MUCOSA    MICROSCOPIC DESCRIPTION:  Tissue blocks are prepared and slides are examined microscopically on all  specimens. See diagnosis for details.    Professional interpretation rendered by Boby Rowe M.D. at [facility], [address] [place] [address].    GROSS DESCRIPTION:  A.  Specimen is received in 1 formalin filled container \"small bowel biopsy\"  and consists of 2 pieces of tan soft tissue measuring 0.4 x 0.3 x 0.2 cm.  Specimen is submitted entirely in 1 cassette.  B.  Specimen is received in 1 formalin filled container \"antrum biopsy\" and  consists of 2 pieces of tan soft tissue measuring  0.6 x 0.4 x 0.2 cm.  Specimen is submitted entirely in 1 cassette.  C.  Specimen is received in 1 formalin filled container \"GE junction\" and  consists of a single piece of tan soft tissue measuring 0.3 x 0.2 x 0.2 cm.  Specimen submitted entirely in 1 cassette.  D.  Specimen is received in 1 formalin filled container \"terminal ileum biopsy\"  and consists of 2 pieces of tan soft tissue measuring 0.4 x 0.3 x 0.2 cm.  Specimen is submitted entirely in 1 cassette.  E.  Specimen is received in 1 formalin filled container \"polyp mucosa biopsy\"  consists of 2 pieces of tan soft tissue measuring 0.4 x 0.3 x 0.2 cm.  Specimen submitted entirely in 1 cassette.  MM    REVIEWED, DIAGNOSED AND ELECTRONICALLY  SIGNED BY:    Boby Rowe M.D.  CPT CODES:  88305x5     Results for orders placed or performed in visit on 10/14/22   TSH    Specimen: Blood   Result Value Ref Range    TSH 1.410 0.270 - 4.200 uIU/mL "   T4, Free    Specimen: Blood   Result Value Ref Range    Free T4 1.29 0.93 - 1.70 ng/dL   Results for orders placed or performed during the hospital encounter of 10/11/22   [facility] Top - SST   Result Value Ref Range    Extra Tube Hold for add-ons.    Scan Slide    Specimen: Blood   Result Value Ref Range    RBC Morphology Normal Normal    WBC Morphology Normal Normal    Platelet Morphology Normal Normal   Urinalysis With Microscopic If Indicated (No Culture) - Urine, Clean Catch    Specimen: Urine, Clean Catch   Result Value Ref Range    Color, UA Yellow Yellow, Straw, Dark Yellow, Anny    Appearance, UA Cloudy (A) Clear    pH, UA 7.5 5.0 - 9.0    Specific Gravity, UA 1.020 1.003 - 1.030    Glucose, UA Negative Negative    Ketones, UA Negative Negative    Bilirubin, UA Negative Negative    Blood, UA Negative Negative    Protein, UA Negative Negative    Leuk Esterase, UA Negative Negative    Nitrite, UA Negative Negative    Urobilinogen, UA 0.2 E.U./dL 0.2 - 1.0 E.U./dL   CBC Auto Differential    Specimen: Blood   Result Value Ref Range    WBC 13.78 (H) 3.40 - 10.80 10*3/mm3    RBC 4.64 4.14 - 5.80 10*6/mm3    Hemoglobin 13.7 13.0 - 17.7 g/dL    Hematocrit 40.6 37.5 - 51.0 %    MCV 87.5 79.0 - 97.0 fL    MCH 29.5 26.6 - 33.0 pg    MCHC 33.7 31.5 - 35.7 g/dL    RDW 12.7 12.3 - 15.4 %    RDW-SD 40.1 37.0 - 54.0 fl    MPV 11.3 6.0 - 12.0 fL    Platelets 181 140 - 450 10*3/mm3    Neutrophil % 86.3 (H) 42.7 - 76.0 %    Lymphocyte % 5.4 (L) 19.6 - 45.3 %    Monocyte % 7.3 5.0 - 12.0 %    Eosinophil % 0.1 (L) 0.3 - 6.2 %    Basophil % 0.3 0.0 - 1.5 %    Immature Grans % 0.6 (H) 0.0 - 0.5 %    Neutrophils, Absolute 11.91 (H) 1.70 - 7.00 10*3/mm3    Lymphocytes, Absolute 0.74 0.70 - 3.10 10*3/mm3    Monocytes, Absolute 1.00 (H) 0.10 - 0.90 10*3/mm3    Eosinophils, Absolute 0.01 0.00 - 0.40 10*3/mm3    Basophils, Absolute 0.04 0.00 - 0.20 10*3/mm3    Immature Grans, Absolute 0.08 (H) 0.00 - 0.05 10*3/mm3    nRBC 0.0 0.0 - 0.2  /100 WBC   Urine Drug Screen - Urine, Clean Catch    Specimen: Urine, Clean Catch   Result Value Ref Range    THC, Screen, Urine Positive (A) Negative    Phencyclidine (PCP), Urine Negative Negative    Cocaine Screen, Urine Negative Negative    Methamphetamine, Ur Negative Negative    Opiate Screen Negative Negative    Amphetamine Screen, Urine Positive (A) Negative    Benzodiazepine Screen, Urine Negative Negative    Tricyclic Antidepressants Screen Negative Negative    Methadone Screen, Urine Negative Negative    Barbiturates Screen, Urine Negative Negative    Oxycodone Screen, Urine Negative Negative    Propoxyphene Screen Negative Negative    Buprenorphine, Screen, Urine Negative Negative   Magnesium    Specimen: Blood   Result Value Ref Range    Magnesium 1.6 1.6 - 2.6 mg/dL   CK    Specimen: Blood   Result Value Ref Range    Creatine Kinase 477 (H) 20 - 200 U/L   Basic Metabolic Panel    Specimen: Blood   Result Value Ref Range    Glucose 98 65 - 99 mg/dL    BUN 14 6 - 20 mg/dL    Creatinine 1.06 0.76 - 1.27 mg/dL    Sodium 137 136 - 145 mmol/L    Potassium 4.0 3.5 - 5.2 mmol/L    Chloride 101 98 - 107 mmol/L    CO2 24.0 22.0 - 29.0 mmol/L    Calcium 9.9 8.6 - 10.5 mg/dL    BUN/Creatinine Ratio 13.2 7.0 - 25.0    Anion Gap 12.0 5.0 - 15.0 mmol/L    eGFR 94.4 >60.0 mL/min/1.73   Results for orders placed or performed during the hospital encounter of 01/11/22   COVID-19, [facility] IN-HOUSE, NP SWAB IN TRANSPORT MEDIA 8-10 HR TAT - Swab, Anterior nasal    Specimen: Anterior nasal; Swab   Result Value Ref Range    COVID19 Detected (C) Not Detected - Ref. Range   POCT Influenza A/B    Specimen: Swab   Result Value Ref Range    Rapid Influenza A Ag Negative Negative    Rapid Influenza B Ag Negative Negative    Internal Control Passed Passed    Lot Number [id number]     Expiration Date 12/18/2022    Results for orders placed or performed during the hospital encounter of 12/28/21   POCT Influenza A/B    Specimen: Swab    Result Value Ref Range    Rapid Influenza A Ag Negative Negative    Rapid Influenza B Ag Negative Negative    Internal Control Passed Passed    Lot Number [id number]     Expiration Date 12/18/2022    Results for orders placed or performed during the hospital encounter of 12/26/21   COVID-19, [facility] IN-HOUSE, NP SWAB IN TRANSPORT MEDIA 8-10 HR TAT - Swab, Anterior nasal    Specimen: Anterior nasal; Swab   Result Value Ref Range    COVID19 Not Detected Not Detected - Ref. Range   Results for orders placed or performed during the hospital encounter of 07/20/21   Gold Top - SST   Result Value Ref Range    Extra Tube Hold for add-ons.    Green Top (Gel)   Result Value Ref Range    Extra Tube Hold for add-ons.    CBC Auto Differential    Specimen: Blood   Result Value Ref Range    WBC 6.77 3.40 - 10.80 10*3/mm3    RBC 4.55 4.14 - 5.80 10*6/mm3    Hemoglobin 13.5 13.0 - 17.7 g/dL    Hematocrit 40.6 37.5 - 51.0 %    MCV 89.2 79.0 - 97.0 fL    MCH 29.7 26.6 - 33.0 pg    MCHC 33.3 31.5 - 35.7 g/dL    RDW 12.8 12.3 - 15.4 %    RDW-SD 42.1 37.0 - 54.0 fl    MPV 11.6 6.0 - 12.0 fL    Platelets 129 (L) 140 - 450 10*3/mm3    Neutrophil % 70.4 42.7 - 76.0 %    Lymphocyte % 21.4 19.6 - 45.3 %    Monocyte % 7.7 5.0 - 12.0 %    Eosinophil % 0.1 (L) 0.3 - 6.2 %    Basophil % 0.1 0.0 - 1.5 %    Immature Grans % 0.3 0.0 - 0.5 %    Neutrophils, Absolute 4.76 1.70 - 7.00 10*3/mm3    Lymphocytes, Absolute 1.45 0.70 - 3.10 10*3/mm3    Monocytes, Absolute 0.52 0.10 - 0.90 10*3/mm3    Eosinophils, Absolute 0.01 0.00 - 0.40 10*3/mm3    Basophils, Absolute 0.01 0.00 - 0.20 10*3/mm3    Immature Grans, Absolute 0.02 0.00 - 0.05 10*3/mm3    nRBC 0.0 0.0 - 0.2 /100 WBC     *Note: Due to a large number of results and/or encounters for the requested time period, some results have not been displayed. A complete set of results can be found in Results Review.         This document has been electronically signed by Jeanette Arauz MD on December 18, 2022  20:16 CST

## 2023-08-08 RX ORDER — SUCRALFATE 1 G/1
TABLET ORAL
Qty: 120 TABLET | Refills: 4 | OUTPATIENT
Start: 2023-08-08

## 2023-09-06 RX ORDER — DICYCLOMINE HCL 20 MG
TABLET ORAL
Qty: 120 TABLET | OUTPATIENT
Start: 2023-09-06

## 2023-09-15 ENCOUNTER — OFFICE VISIT (OUTPATIENT)
Dept: PODIATRY | Facility: CLINIC | Age: 33
End: 2023-09-15
Payer: COMMERCIAL

## 2023-09-15 VITALS
WEIGHT: 224 LBS | DIASTOLIC BLOOD PRESSURE: 100 MMHG | OXYGEN SATURATION: 99 % | SYSTOLIC BLOOD PRESSURE: 160 MMHG | HEART RATE: 67 BPM | HEIGHT: 71 IN | BODY MASS INDEX: 31.36 KG/M2

## 2023-09-15 DIAGNOSIS — M21.41 PES PLANUS OF BOTH FEET: ICD-10-CM

## 2023-09-15 DIAGNOSIS — M76.821 POSTERIOR TIBIAL TENDINITIS OF RIGHT LOWER EXTREMITY: Primary | ICD-10-CM

## 2023-09-15 DIAGNOSIS — M79.671 RIGHT FOOT PAIN: ICD-10-CM

## 2023-09-15 DIAGNOSIS — M25.571 ACUTE RIGHT ANKLE PAIN: ICD-10-CM

## 2023-09-15 DIAGNOSIS — M21.42 PES PLANUS OF BOTH FEET: ICD-10-CM

## 2023-09-15 PROCEDURE — 3077F SYST BP >= 140 MM HG: CPT | Performed by: NURSE PRACTITIONER

## 2023-09-15 PROCEDURE — 3080F DIAST BP >= 90 MM HG: CPT | Performed by: NURSE PRACTITIONER

## 2023-09-15 PROCEDURE — 99214 OFFICE O/P EST MOD 30 MIN: CPT | Performed by: NURSE PRACTITIONER

## 2023-09-15 RX ORDER — MELOXICAM 15 MG/1
15 TABLET ORAL DAILY
Qty: 30 TABLET | Refills: 1 | Status: SHIPPED | OUTPATIENT
Start: 2023-09-15 | End: 2023-10-15

## 2023-09-15 NOTE — PROGRESS NOTES
Roosevelt Ochoa  1990  33 y.o. male        09/15/2023    Chief Complaint   Patient presents with    Right Foot - Pain    Right Ankle - Pain       History of Present Illness    Roosevelt Ochoa is a 33 y.o.male who presents to clinic today for right foot pain and ankle pain.       Past Medical History:   Diagnosis Date    Asthma     Blood chemistry abnormality     Diarrhea     Elevated blood pressure reading without diagnosis of hypertension     Foot pain     probable tendonitis left foot       Hip pain     Hyperglycemia     Hypertension     Nausea and vomiting     Sickle cell disease     Sleep apnea          Past Surgical History:   Procedure Laterality Date    COLONOSCOPY N/A 11/3/2022    Procedure: COLONOSCOPY;  Surgeon: Jeanette Arauz MD;  Location: [facility] ENDOSCOPY;  Service: Gastroenterology;  Laterality: N/A;    DENTAL PROCEDURE      ENDOSCOPY N/A 11/3/2022    Procedure: ESOPHAGOGASTRODUODENOSCOPY;  Surgeon: Jeanette Arauz MD;  Location: [facility] ENDOSCOPY;  Service: Gastroenterology;  Laterality: N/A;    EYE SURGERY           Family History   Problem Relation Age of Onset    Hypertension Mother     Heart disease Other     Diabetes Other        Allergies   Allergen Reactions    Lisinopril Anaphylaxis       Social History     Socioeconomic History    Marital status: Single   Tobacco Use    Smoking status: Former     Types: Cigars     Start date:      Quit date:      Years since quittin.7    Smokeless tobacco: Never   Vaping Use    Vaping Use: Never used   Substance and Sexual Activity    Alcohol use: Not Currently     Comment: occasional    Drug use: No     Types: Marijuana    Sexual activity: Defer         Current Outpatient Medications   Medication Sig Dispense Refill    amLODIPine (NORVASC) 10 MG tablet Take 1 tablet by mouth Daily. 90 tablet 3    amphetamine-dextroamphetamine (ADDERALL) 20 MG tablet Take 20 mg by mouth 3 (Three) Times a Day.       "amphetamine-dextroamphetamine XR (ADDERALL XR) 30 MG 24 hr capsule Take 1 capsule by mouth Daily For narcolepsy. 30 capsule 0    armodafinil (NUVIGIL) 150 MG tablet Take 1 tablet by mouth Every Morning. 30 tablet 0    baclofen (LIORESAL) 10 MG tablet Take 1 tablet by mouth Daily.      Ca, Mg, K, and Na Oxybates 500 MG/ML solution Take 2.25 g by mouth.      chlorthalidone (HYGROTON) 25 MG tablet Take 1 tablet by mouth Daily. 90 tablet 3    clotrimazole-betamethasone (LOTRISONE) 1-0.05 % cream Use twice a day until rash clears up then use it for three more days. 45 g 0    dicyclomine (BENTYL) 20 MG tablet       meloxicam (Mobic) 15 MG tablet Take 1 tablet by mouth Daily for 30 days. 30 tablet 1    ondansetron ODT (ZOFRAN-ODT) 4 MG disintegrating tablet Place 1 tablet on the tongue Every 6 (Six) Hours As Needed for Nausea or Vomiting. 15 tablet 0    pantoprazole (PROTONIX) 40 MG EC tablet Take 1 tablet by mouth Daily. 90 tablet 1    Sodium Oxybate (XYREM PO) Take 60 mL by mouth 2 (two) times a day.      sucralfate (CARAFATE) 1 g tablet       tobramycin 0.3 % solution ophthalmic solution Administer 2 drops to the right eye Every 4 (Four) Hours. 5 mL 0     No current facility-administered medications for this visit.       Review of Systems   Musculoskeletal:         Foot pain        OBJECTIVE    /100   Pulse 67   Ht 180.3 cm (71\")   Wt 102 kg (224 lb)   SpO2 99%   BMI 31.24 kg/m²     Body mass index is 31.24 kg/m².        Physical Exam  Vitals reviewed.   Constitutional:       Appearance: Normal appearance. He is well-developed.   HENT:      Head: Normocephalic and atraumatic.   Neck:      Trachea: Trachea and phonation normal.   Cardiovascular:      Pulses:           Dorsalis pedis pulses are 2+ on the right side and 2+ on the left side.        Posterior tibial pulses are 2+ on the right side and 2+ on the left side.   Pulmonary:      Effort: Pulmonary effort is normal. No respiratory distress.   Abdominal: "      General: There is no distension.      Palpations: Abdomen is soft.   Musculoskeletal:      Right foot: Bunion present.      Left foot: Bunion present.   Feet:      Right foot:      Skin integrity: Dry skin present.      Toenail Condition: Right toenails are abnormally thick.      Left foot:      Skin integrity: Dry skin present.      Toenail Condition: Left toenails are abnormally thick.      Comments: Bilateral pes planus    Right foot-palpable tenderness along the posterior tibial tendon and the distal tip of the lateral malleolus  Skin:     General: Skin is warm and dry.   Neurological:      Mental Status: He is alert and oriented to person, place, and time.      GCS: GCS eye subscore is 4. GCS verbal subscore is 5. GCS motor subscore is 6.   Psychiatric:         Speech: Speech normal.         Behavior: Behavior normal. Behavior is cooperative.         Thought Content: Thought content normal.         Judgment: Judgment normal.              Procedures        ASSESSMENT AND PLAN    Diagnoses and all orders for this visit:    1. Posterior tibial tendinitis of right lower extremity (Primary)  -     Ambulatory Referral to Physical Therapy Evaluate and treat    2. Right foot pain  -     XR foot 3+ vw bilateral  -     Ambulatory Referral to Physical Therapy Evaluate and treat    3. Acute right ankle pain  -     Ambulatory Referral to Physical Therapy Evaluate and treat    4. Pes planus of both feet  -     Ambulatory Referral to Physical Therapy Evaluate and treat    Other orders  -     meloxicam (Mobic) 15 MG tablet; Take 1 tablet by mouth Daily for 30 days.  Dispense: 30 tablet; Refill: 1      Body mass index is 31.24 kg/m².    Recommend follow-up with primary care to discuss BMI greater than 30      Recommend a course of anti-inflammatories, activity modification, physical therapy with a guided home exercise program and follow-up in 4 to 6 weeks.  Repeat x-rays of the right ankle at that visit.  Contact office  for worsening symptoms in the meantime.          This document has been electronically signed by Jack CHURCH, FNP-C, ONP-C on September 15, 2023 15:49 CDT

## (undated) DEVICE — SINGLE-USE BIOPSY FORCEPS: Brand: RADIAL JAW 4

## (undated) DEVICE — BITEBLOCK ENDO W/STRAP 60F A/ LF DISP